# Patient Record
Sex: FEMALE | Race: WHITE | Employment: FULL TIME | URBAN - NONMETROPOLITAN AREA
[De-identification: names, ages, dates, MRNs, and addresses within clinical notes are randomized per-mention and may not be internally consistent; named-entity substitution may affect disease eponyms.]

---

## 2017-02-01 ENCOUNTER — OFFICE VISIT (OUTPATIENT)
Dept: FAMILY MEDICINE CLINIC | Facility: CLINIC | Age: 57
End: 2017-02-01

## 2017-02-01 VITALS
WEIGHT: 232 LBS | TEMPERATURE: 98 F | DIASTOLIC BLOOD PRESSURE: 80 MMHG | SYSTOLIC BLOOD PRESSURE: 140 MMHG | BODY MASS INDEX: 37 KG/M2

## 2017-02-01 DIAGNOSIS — B34.0 ADENOVIRUS INFECTION: Primary | ICD-10-CM

## 2017-02-01 PROCEDURE — 99213 OFFICE O/P EST LOW 20 MIN: CPT | Performed by: INTERNAL MEDICINE

## 2017-02-01 NOTE — PROGRESS NOTES
Edward Gutierrez is a 49year old female. HPI:   Pt has been sick this week. Grandson's is sick as well. She has bilateral conjunctivitis and sore throat.      Current Outpatient Prescriptions:  LISINOPRIL 40 MG Oral Tab TAKE 1 TABLET (40 MG TOTAL) BY BILL the next 5-7 days. No orders of the defined types were placed in this encounter.        Meds & Refills for this Visit:  Pending Prescriptions Disp Refills    gentamicin Sulfate (GENTAK) 0.3 % Ophthalmic Ointment 3.5 g 0     Sig: Place 1 Application into

## 2017-02-06 ENCOUNTER — OFFICE VISIT (OUTPATIENT)
Dept: FAMILY MEDICINE CLINIC | Facility: CLINIC | Age: 57
End: 2017-02-06

## 2017-02-06 VITALS — WEIGHT: 232 LBS | BODY MASS INDEX: 37.28 KG/M2 | OXYGEN SATURATION: 98 % | HEART RATE: 83 BPM | HEIGHT: 66 IN

## 2017-02-06 DIAGNOSIS — Z02.9 ENCOUNTERS FOR ADMINISTRATIVE PURPOSE: Primary | ICD-10-CM

## 2017-02-06 NOTE — PROGRESS NOTES
Pt presented to [facility] for sore throat. While being roomed, she was notified that PCP's office had already sent antibiotic via phone for her symptoms. Pt declines visit and left prior to seeing provider. I did not see pt at all today.   F/U wi

## 2017-02-20 ENCOUNTER — TELEPHONE (OUTPATIENT)
Dept: FAMILY MEDICINE CLINIC | Facility: CLINIC | Age: 57
End: 2017-02-20

## 2017-04-18 ENCOUNTER — OFFICE VISIT (OUTPATIENT)
Dept: FAMILY MEDICINE CLINIC | Facility: CLINIC | Age: 57
End: 2017-04-18

## 2017-04-18 VITALS — SYSTOLIC BLOOD PRESSURE: 138 MMHG | TEMPERATURE: 99 F | DIASTOLIC BLOOD PRESSURE: 86 MMHG

## 2017-04-18 DIAGNOSIS — J02.9 PHARYNGITIS, UNSPECIFIED ETIOLOGY: Primary | ICD-10-CM

## 2017-04-18 PROCEDURE — 87880 STREP A ASSAY W/OPTIC: CPT | Performed by: INTERNAL MEDICINE

## 2017-04-18 PROCEDURE — 99214 OFFICE O/P EST MOD 30 MIN: CPT | Performed by: INTERNAL MEDICINE

## 2017-04-18 PROCEDURE — 87081 CULTURE SCREEN ONLY: CPT | Performed by: INTERNAL MEDICINE

## 2017-04-18 RX ORDER — PREDNISONE 20 MG/1
20 TABLET ORAL 2 TIMES DAILY
Qty: 14 TABLET | Refills: 0 | Status: SHIPPED | OUTPATIENT
Start: 2017-04-18 | End: 2017-07-27

## 2017-04-19 NOTE — PROGRESS NOTES
HPI:   Kiara Simon is a 56year old female who presents for upper respiratory symptoms for  3  days. Patient reports congestion, low grade fever, cough is keeping pt up at night.       Current Outpatient Prescriptions:  predniSONE 20 MG Oral Tab Take 1 pain  NEURO: denies headaches    EXAM:   /86 mmHg  Temp(Src) 99 °F (37.2 °C)  GENERAL: well developed, well nourished,in no apparent distress  SKIN: no rashes,no suspicious lesions  EYES:PERRLA, EOMI, normal optic disk,conjunctiva are clear  HEENT: a

## 2017-04-21 ENCOUNTER — OFFICE VISIT (OUTPATIENT)
Dept: FAMILY MEDICINE CLINIC | Facility: CLINIC | Age: 57
End: 2017-04-21

## 2017-04-21 VITALS
BODY MASS INDEX: 36.53 KG/M2 | OXYGEN SATURATION: 99 % | TEMPERATURE: 98 F | RESPIRATION RATE: 16 BRPM | HEIGHT: 66.5 IN | SYSTOLIC BLOOD PRESSURE: 130 MMHG | DIASTOLIC BLOOD PRESSURE: 80 MMHG | HEART RATE: 96 BPM | WEIGHT: 230 LBS

## 2017-04-21 DIAGNOSIS — B34.9 VIRAL SYNDROME: Primary | ICD-10-CM

## 2017-04-21 PROCEDURE — 99213 OFFICE O/P EST LOW 20 MIN: CPT | Performed by: INTERNAL MEDICINE

## 2017-04-21 NOTE — PROGRESS NOTES
HPI:   Deidre Lai is a 56year old female who presents for upper respiratory symptoms for  7  days. Patient reports congestion, dry cough, ear pain.       Current Outpatient Prescriptions:  predniSONE 20 MG Oral Tab Take 1 tablet (20 mg total) by mouth /80 mmHg  Pulse 96  Temp(Src) 97.8 °F (36.6 °C) (Temporal)  Resp 16  Ht 66.5\"  Wt 230 lb  BMI 36.57 kg/m2  SpO2 99%  GENERAL: well developed, well nourished,in no apparent distress  SKIN: no rashes,no suspicious lesions  EYES:PERRLA, EOMI, normal

## 2017-07-05 RX ORDER — LISINOPRIL 40 MG/1
40 TABLET ORAL DAILY
Qty: 90 TABLET | Refills: 1 | Status: SHIPPED
Start: 2017-07-05 | End: 2017-08-03

## 2017-07-05 RX ORDER — ESCITALOPRAM OXALATE 20 MG/1
20 TABLET ORAL
Qty: 90 TABLET | Refills: 1 | Status: SHIPPED
Start: 2017-07-05 | End: 2018-02-01

## 2017-07-05 RX ORDER — LISINOPRIL 40 MG/1
40 TABLET ORAL DAILY
Qty: 90 TABLET | Refills: 1 | Status: SHIPPED
Start: 2017-07-05 | End: 2018-02-01

## 2017-07-05 RX ORDER — ESCITALOPRAM OXALATE 20 MG/1
20 TABLET ORAL
Qty: 90 TABLET | Refills: 1 | Status: SHIPPED
Start: 2017-07-05 | End: 2017-08-03

## 2017-07-05 NOTE — TELEPHONE ENCOUNTER
From: Caitlin Smith  Sent: 7/4/2017 8:02 AM CDT  Subject: Medication Renewal Request    Caitlin Smith would like a refill of the following medications:  escitalopram 20 MG Oral Tab Mojgan Mejias MD]  LISINOPRIL 40 MG Oral Tab Mojgan Mejias MD]    Pr

## 2017-07-05 NOTE — TELEPHONE ENCOUNTER
From: Evens Aquino  Sent: 7/5/2017 8:06 AM CDT  Subject: Medication Renewal Request    Evens Aquino would like a refill of the following medications:  escitalopram 20 MG Oral Tab Maddi Barbosa MD]  LISINOPRIL 40 MG Oral Tab Maddi Barbosa MD]    Pr

## 2017-07-27 ENCOUNTER — TELEPHONE (OUTPATIENT)
Dept: FAMILY MEDICINE CLINIC | Facility: CLINIC | Age: 57
End: 2017-07-27

## 2017-07-27 ENCOUNTER — HOSPITAL ENCOUNTER (OUTPATIENT)
Age: 57
Discharge: HOME | End: 2017-07-27
Attending: FAMILY MEDICINE
Payer: COMMERCIAL

## 2017-07-27 ENCOUNTER — APPOINTMENT (OUTPATIENT)
Dept: GENERAL RADIOLOGY | Age: 57
End: 2017-07-27
Attending: FAMILY MEDICINE
Payer: COMMERCIAL

## 2017-07-27 VITALS
RESPIRATION RATE: 16 BRPM | HEART RATE: 85 BPM | TEMPERATURE: 98 F | DIASTOLIC BLOOD PRESSURE: 74 MMHG | OXYGEN SATURATION: 97 % | SYSTOLIC BLOOD PRESSURE: 121 MMHG

## 2017-07-27 DIAGNOSIS — S83.401A SPRAIN OF COLLATERAL LIGAMENT OF RIGHT KNEE, INITIAL ENCOUNTER: ICD-10-CM

## 2017-07-27 DIAGNOSIS — M25.461 KNEE EFFUSION, RIGHT: Primary | ICD-10-CM

## 2017-07-27 PROCEDURE — 73560 X-RAY EXAM OF KNEE 1 OR 2: CPT | Performed by: FAMILY MEDICINE

## 2017-07-27 PROCEDURE — 99204 OFFICE O/P NEW MOD 45 MIN: CPT

## 2017-07-27 PROCEDURE — 99213 OFFICE O/P EST LOW 20 MIN: CPT

## 2017-07-27 RX ORDER — IBUPROFEN 600 MG/1
600 TABLET ORAL EVERY 8 HOURS PRN
Qty: 21 TABLET | Refills: 0 | Status: SHIPPED | OUTPATIENT
Start: 2017-07-27 | End: 2017-08-03

## 2017-07-27 NOTE — ED PROVIDER NOTES
Patient Seen in: [address] [facility]    History   Patient presents with:  Knee Pain    Stated Complaint: knee pain    HPI    82-year-old female presents to immediate care with a painful right knee, 2 hours ago while at work was walking and fe Systems    Positive for stated complaint: knee pain  Other systems are as noted in HPI. Constitutional and vital signs reviewed. All other systems reviewed and negative except as noted above.     PSFH elements reviewed from today and agreed except as XR KNEE (1 OR 2 VIEWS), RIGHT (CPT=73560)  COMPARISON:  None. INDICATIONS:  knee pain  PATIENT STATED HISTORY: (As transcribed by Technologist)  Sudden onset of medial right knee pain and \"crunching\" sound when walking today.     FINDINGS:  BONES:  There

## 2017-07-27 NOTE — TELEPHONE ENCOUNTER
LM for patient that Dr Kathryn Monk wants her to go to [facility] urgent care Efrain Prabhakar.

## 2017-07-27 NOTE — TELEPHONE ENCOUNTER
Patient said she was walking and felt like her knee \"gave out\" and now it is numb and painful. Urgent care [place]?

## 2017-08-03 ENCOUNTER — OFFICE VISIT (OUTPATIENT)
Dept: FAMILY MEDICINE CLINIC | Facility: CLINIC | Age: 57
End: 2017-08-03

## 2017-08-03 VITALS
BODY MASS INDEX: 38.25 KG/M2 | HEIGHT: 66 IN | WEIGHT: 238 LBS | DIASTOLIC BLOOD PRESSURE: 80 MMHG | HEART RATE: 74 BPM | SYSTOLIC BLOOD PRESSURE: 138 MMHG | TEMPERATURE: 98 F | OXYGEN SATURATION: 99 %

## 2017-08-03 DIAGNOSIS — E66.9 OBESITY (BMI 30-39.9): ICD-10-CM

## 2017-08-03 DIAGNOSIS — M17.11 PRIMARY OSTEOARTHRITIS OF RIGHT KNEE: Primary | ICD-10-CM

## 2017-08-03 PROCEDURE — 99214 OFFICE O/P EST MOD 30 MIN: CPT | Performed by: INTERNAL MEDICINE

## 2017-08-03 NOTE — PROGRESS NOTES
Ferdinand Her is a 56year old female. HPI:   Pt has an effusion and pain in the right knee. She is going to a [employer] convention this week and has it wrapped with compression. She did have X -rays at urgent care and she has tri compartment disease. return, ice, nsaids, and reduce weight to prevent further flare ups. She has never had injections of any kind. No orders of the defined types were placed in this encounter.       Meds & Refills for this Visit:  No prescriptions requested or ordered in th

## 2017-08-11 ENCOUNTER — TELEPHONE (OUTPATIENT)
Dept: FAMILY MEDICINE CLINIC | Facility: CLINIC | Age: 57
End: 2017-08-11

## 2017-08-11 NOTE — TELEPHONE ENCOUNTER
ER DOCTOR GAVE HER SCRIPT FOR IBUPROFEN 600MG AND IT IS NOT HELPING ASKING FOR SOMETHING STRONGER FOR KNEE PAIN

## 2017-08-11 NOTE — TELEPHONE ENCOUNTER
Patient called and states that she was seen at urgent care on 7/27 for right knee pain, she was given ibuprofen 600 mg tabs, and they helped at first, but they are no longer helping with the pain.  Patient states she has one tablet left, and is wanting to k

## 2017-08-12 NOTE — TELEPHONE ENCOUNTER
Per v/o from Dr. Breanna simmons to send Diclofenac 50 mg tabs three times daily as needed. Patient should f/u to discuss what to do next. Medication sent to pharmacy. Patient states will call back to schedule.    Latasha Brown, 08/12/17, 9:35 AM

## 2017-08-15 ENCOUNTER — TELEPHONE (OUTPATIENT)
Dept: FAMILY MEDICINE CLINIC | Facility: CLINIC | Age: 57
End: 2017-08-15

## 2017-08-15 NOTE — TELEPHONE ENCOUNTER
Woke up this morning, foot is very swollen,  Looks like its  sprained, Can she be seen today?   Does have appt tomorrow @ 10:45

## 2017-08-15 NOTE — TELEPHONE ENCOUNTER
Patient states that her \"foot is as big as a house\" but she denies pain. She said that she does not want to see anybody else.  She said that she has an appointment with Dr Karen Alfaro tomorrow and wants to know if there is anything else she should do besides el

## 2017-08-16 ENCOUNTER — OFFICE VISIT (OUTPATIENT)
Dept: FAMILY MEDICINE CLINIC | Facility: CLINIC | Age: 57
End: 2017-08-16

## 2017-08-16 VITALS
HEART RATE: 82 BPM | RESPIRATION RATE: 16 BRPM | WEIGHT: 238 LBS | SYSTOLIC BLOOD PRESSURE: 130 MMHG | BODY MASS INDEX: 38 KG/M2 | DIASTOLIC BLOOD PRESSURE: 80 MMHG | TEMPERATURE: 98 F

## 2017-08-16 DIAGNOSIS — Z12.39 SCREENING FOR BREAST CANCER: ICD-10-CM

## 2017-08-16 DIAGNOSIS — M79.89 FOOT SWELLING: Primary | ICD-10-CM

## 2017-08-16 DIAGNOSIS — M66.0 RUPTURED BAKERS CYST: ICD-10-CM

## 2017-08-16 DIAGNOSIS — M13.861 ALLERGIC ARTHRITIS OF RIGHT KNEE: ICD-10-CM

## 2017-08-16 PROCEDURE — 99214 OFFICE O/P EST MOD 30 MIN: CPT | Performed by: INTERNAL MEDICINE

## 2017-08-17 NOTE — PROGRESS NOTES
Yaquelin Ramirez is a 56year old female. HPI:   Pt has had swelling in her right foot that started when she went to [place] for an [employer]. She has vast improvement now and no pain, tenderness or redness.  She did have nee pain prior to leaving a (primary encounter diagnosis)  Ruptured bakers cyst  Screening for breast cancer  Allergic arthritis of right knee  Pt had a ruptured baker's cyst tht is now resolving, I have sent her to Dr Yazmin Murphy for resolution of her right knee pain.    No orders of th

## 2017-09-26 ENCOUNTER — HOSPITAL ENCOUNTER (OUTPATIENT)
Dept: GENERAL RADIOLOGY | Age: 57
Discharge: HOME | End: 2017-09-26
Attending: ORTHOPAEDIC SURGERY
Payer: COMMERCIAL

## 2017-09-26 DIAGNOSIS — M25.561 RIGHT KNEE PAIN, UNSPECIFIED CHRONICITY: ICD-10-CM

## 2017-09-26 PROCEDURE — 73562 X-RAY EXAM OF KNEE 3: CPT | Performed by: ORTHOPAEDIC SURGERY

## 2017-09-26 PROCEDURE — 73564 X-RAY EXAM KNEE 4 OR MORE: CPT | Performed by: ORTHOPAEDIC SURGERY

## 2017-10-30 ENCOUNTER — TELEPHONE (OUTPATIENT)
Dept: FAMILY MEDICINE CLINIC | Facility: CLINIC | Age: 57
End: 2017-10-30

## 2017-10-30 NOTE — TELEPHONE ENCOUNTER
Pt has a cramp in leg that has not gone away in 3 days. Scheduled an appt for tomorrow, wants to know if she should be seen sooner?

## 2017-10-30 NOTE — TELEPHONE ENCOUNTER
Patient said she woke up Friday night with bad \"cramps in her right leg\" from the bottom of her calf to the top of her calf. She said said it is painful when she walks and it gets very \"tight\" .  She said she got a Cortisone shot in that knee at the end

## 2017-10-31 ENCOUNTER — OFFICE VISIT (OUTPATIENT)
Dept: FAMILY MEDICINE CLINIC | Facility: CLINIC | Age: 57
End: 2017-10-31

## 2017-10-31 VITALS
HEART RATE: 80 BPM | WEIGHT: 239.25 LBS | TEMPERATURE: 98 F | HEIGHT: 66 IN | BODY MASS INDEX: 38.45 KG/M2 | RESPIRATION RATE: 18 BRPM | SYSTOLIC BLOOD PRESSURE: 140 MMHG | DIASTOLIC BLOOD PRESSURE: 78 MMHG

## 2017-10-31 DIAGNOSIS — R25.2 MUSCLE CRAMPS: Primary | ICD-10-CM

## 2017-10-31 DIAGNOSIS — K64.5: ICD-10-CM

## 2017-10-31 PROCEDURE — 99214 OFFICE O/P EST MOD 30 MIN: CPT | Performed by: INTERNAL MEDICINE

## 2017-10-31 NOTE — PROGRESS NOTES
Mable Lama is a 56year old female. HPI:   Pt has been having some cramps in the right leg. She had an injection recently for osteoarthritis which helped tremendously. She was instructed to stretch and hydrate and her cramps are much better.  She ha (primary encounter diagnosis)  External thrombosed piles  Resolved muscle cramping  External pile non-tender, recommended Epson salts and tucks pads. No orders of the defined types were placed in this encounter.       Meds & Refills for this Visit:  No p

## 2017-12-11 ENCOUNTER — TELEPHONE (OUTPATIENT)
Dept: FAMILY MEDICINE CLINIC | Facility: CLINIC | Age: 57
End: 2017-12-11

## 2017-12-11 NOTE — TELEPHONE ENCOUNTER
Patient said yesterday at work she got very dizzy and shaky. She said she sat down and drank a coke then went home and fell asleep. She said that today she feels a \"little woozy\" and one side of her tongue is numb.

## 2017-12-12 ENCOUNTER — LAB ENCOUNTER (OUTPATIENT)
Dept: LAB | Age: 57
End: 2017-12-12
Attending: INTERNAL MEDICINE
Payer: COMMERCIAL

## 2017-12-12 ENCOUNTER — OFFICE VISIT (OUTPATIENT)
Dept: FAMILY MEDICINE CLINIC | Facility: CLINIC | Age: 57
End: 2017-12-12

## 2017-12-12 VITALS
WEIGHT: 241 LBS | RESPIRATION RATE: 16 BRPM | SYSTOLIC BLOOD PRESSURE: 128 MMHG | TEMPERATURE: 98 F | HEART RATE: 64 BPM | BODY MASS INDEX: 39 KG/M2 | DIASTOLIC BLOOD PRESSURE: 80 MMHG

## 2017-12-12 DIAGNOSIS — R42 DIZZINESS: Primary | ICD-10-CM

## 2017-12-12 DIAGNOSIS — R42 DIZZINESS: ICD-10-CM

## 2017-12-12 PROCEDURE — 84439 ASSAY OF FREE THYROXINE: CPT | Performed by: INTERNAL MEDICINE

## 2017-12-12 PROCEDURE — 83036 HEMOGLOBIN GLYCOSYLATED A1C: CPT | Performed by: INTERNAL MEDICINE

## 2017-12-12 PROCEDURE — 99214 OFFICE O/P EST MOD 30 MIN: CPT | Performed by: INTERNAL MEDICINE

## 2017-12-12 PROCEDURE — 36415 COLL VENOUS BLD VENIPUNCTURE: CPT | Performed by: INTERNAL MEDICINE

## 2017-12-12 PROCEDURE — 80050 GENERAL HEALTH PANEL: CPT | Performed by: INTERNAL MEDICINE

## 2017-12-12 NOTE — PROGRESS NOTES
Brooklyn Quiles is a 56year old female. HPI:   Pt has fatigue and dizziness intermittently. It resolves after sugar. She has no pain and no nauseated. She denies chest pain. She has there little \"spells\" from time to time.     Current Outpatient Presc Panel (14) [E]      Hemoglobin A1C [E]      TSH and Free T4 [E]      CBC W Differential W Platelet [E]    Meds & Refills for this Visit:  No prescriptions requested or ordered in this encounter    Imaging & Consults:  None    Follow up as needed.       The

## 2017-12-14 ENCOUNTER — NURSE ONLY (OUTPATIENT)
Dept: FAMILY MEDICINE CLINIC | Facility: CLINIC | Age: 57
End: 2017-12-14

## 2017-12-14 DIAGNOSIS — R42 DIZZINESS: Primary | ICD-10-CM

## 2017-12-14 PROCEDURE — 93000 ELECTROCARDIOGRAM COMPLETE: CPT | Performed by: INTERNAL MEDICINE

## 2018-02-01 RX ORDER — SIMVASTATIN 40 MG
40 TABLET ORAL NIGHTLY
Qty: 90 TABLET | Refills: 0 | Status: SHIPPED | OUTPATIENT
Start: 2018-02-01 | End: 2018-06-07

## 2018-02-01 RX ORDER — ESCITALOPRAM OXALATE 20 MG/1
20 TABLET ORAL
Qty: 90 TABLET | Refills: 0 | Status: SHIPPED | OUTPATIENT
Start: 2018-02-01 | End: 2018-06-07

## 2018-02-01 RX ORDER — LISINOPRIL 40 MG/1
40 TABLET ORAL DAILY
Qty: 90 TABLET | Refills: 0 | Status: SHIPPED | OUTPATIENT
Start: 2018-02-01 | End: 2018-06-07

## 2018-02-01 NOTE — TELEPHONE ENCOUNTER
Last office visit 12-12-17 128/80  Last lipids 10-18-16  Last refill Simvastatin 2-1-16 #90  Last refill other meds 7-5-17 #90 with 1

## 2018-02-01 NOTE — TELEPHONE ENCOUNTER
From: Brooklyn Quiles  Sent: 2/1/2018 9:17 AM CST  Subject: Medication Renewal Request    Brooklyn Quiles would like a refill of the following medications:     simvastatin (ZOCOR) 40 MG Oral Tab Silviano Petty MD]     escitalopram 20 MG Oral Tab Emiliana Graves

## 2018-06-08 RX ORDER — LISINOPRIL 40 MG/1
40 TABLET ORAL DAILY
Qty: 90 TABLET | Refills: 0 | Status: SHIPPED | OUTPATIENT
Start: 2018-06-08 | End: 2018-10-18

## 2018-06-08 RX ORDER — ESCITALOPRAM OXALATE 20 MG/1
20 TABLET ORAL
Qty: 90 TABLET | Refills: 0 | Status: SHIPPED | OUTPATIENT
Start: 2018-06-08 | End: 2018-12-27

## 2018-06-08 RX ORDER — SIMVASTATIN 40 MG
40 TABLET ORAL NIGHTLY
Qty: 90 TABLET | Refills: 0 | Status: SHIPPED | OUTPATIENT
Start: 2018-06-08 | End: 2018-09-10

## 2018-06-08 RX ORDER — SIMVASTATIN 40 MG
40 TABLET ORAL NIGHTLY
Qty: 90 TABLET | Refills: 0 | Status: CANCELLED
Start: 2018-06-08

## 2018-06-08 RX ORDER — LISINOPRIL 40 MG/1
40 TABLET ORAL DAILY
Qty: 90 TABLET | Refills: 0 | Status: CANCELLED
Start: 2018-06-08

## 2018-06-08 RX ORDER — ESCITALOPRAM OXALATE 20 MG/1
20 TABLET ORAL
Qty: 90 TABLET | Refills: 0 | Status: CANCELLED
Start: 2018-06-08

## 2018-06-08 NOTE — TELEPHONE ENCOUNTER
From: Tessa Carranza  Sent: 6/8/2018 9:16 AM CDT  Subject: Medication Renewal Request    Tessa Carranza would like a refill of the following medications:     simvastatin (ZOCOR) 40 MG Oral Tab Dinh Sheridan MD]     escitalopram 20 MG Oral Tab Jeanette Rios

## 2018-06-08 NOTE — TELEPHONE ENCOUNTER
From: Lucio Loyola  Sent: 6/7/2018 11:24 PM CDT  Subject: Medication Renewal Request    Lucio Loyola would like a refill of the following medications:     simvastatin (ZOCOR) 40 MG Oral Tab Helena Aguilar MD]     escitalopram 20 MG Oral Tab Chevis Pleasant

## 2018-07-19 ENCOUNTER — APPOINTMENT (OUTPATIENT)
Dept: GENERAL RADIOLOGY | Facility: HOSPITAL | Age: 58
End: 2018-07-19
Payer: COMMERCIAL

## 2018-07-19 ENCOUNTER — APPOINTMENT (OUTPATIENT)
Dept: MRI IMAGING | Facility: HOSPITAL | Age: 58
End: 2018-07-19
Attending: EMERGENCY MEDICINE
Payer: COMMERCIAL

## 2018-07-19 ENCOUNTER — HOSPITAL ENCOUNTER (EMERGENCY)
Facility: HOSPITAL | Age: 58
Discharge: HOME | End: 2018-07-19
Attending: EMERGENCY MEDICINE
Payer: COMMERCIAL

## 2018-07-19 VITALS
WEIGHT: 245 LBS | OXYGEN SATURATION: 99 % | SYSTOLIC BLOOD PRESSURE: 140 MMHG | HEIGHT: 66 IN | TEMPERATURE: 96 F | HEART RATE: 66 BPM | RESPIRATION RATE: 16 BRPM | DIASTOLIC BLOOD PRESSURE: 53 MMHG | BODY MASS INDEX: 39.37 KG/M2

## 2018-07-19 DIAGNOSIS — R20.2 FACIAL TINGLING: Primary | ICD-10-CM

## 2018-07-19 LAB
ALBUMIN SERPL-MCNC: 3.1 G/DL (ref 3.5–4.8)
ALBUMIN/GLOB SERPL: 0.8 {RATIO} (ref 1–2)
ALP LIVER SERPL-CCNC: 87 U/L (ref 46–118)
ALT SERPL-CCNC: 32 U/L (ref 14–54)
ANION GAP SERPL CALC-SCNC: 8 MMOL/L (ref 0–18)
APTT PPP: 22.4 SECONDS (ref 26.1–34.6)
AST SERPL-CCNC: 21 U/L (ref 15–41)
BASOPHILS # BLD AUTO: 0.05 X10(3) UL (ref 0–0.1)
BASOPHILS NFR BLD AUTO: 0.7 %
BILIRUB SERPL-MCNC: 0.3 MG/DL (ref 0.1–2)
BILIRUB UR QL STRIP.AUTO: NEGATIVE
BUN BLD-MCNC: 19 MG/DL (ref 8–20)
BUN/CREAT SERPL: 29.7 (ref 10–20)
CALCIUM BLD-MCNC: 9 MG/DL (ref 8.3–10.3)
CHLORIDE SERPL-SCNC: 107 MMOL/L (ref 101–111)
CLARITY UR REFRACT.AUTO: CLEAR
CO2 SERPL-SCNC: 26 MMOL/L (ref 22–32)
COLOR UR AUTO: YELLOW
CREAT BLD-MCNC: 0.64 MG/DL (ref 0.55–1.02)
EOSINOPHIL # BLD AUTO: 0.12 X10(3) UL (ref 0–0.3)
EOSINOPHIL NFR BLD AUTO: 1.6 %
ERYTHROCYTE [DISTWIDTH] IN BLOOD BY AUTOMATED COUNT: 11.8 % (ref 11.5–16)
GLOBULIN PLAS-MCNC: 3.9 G/DL (ref 2.5–3.7)
GLUCOSE BLD-MCNC: 124 MG/DL (ref 70–99)
GLUCOSE UR STRIP.AUTO-MCNC: NEGATIVE MG/DL
HCT VFR BLD AUTO: 41 % (ref 34–50)
HGB BLD-MCNC: 13.9 G/DL (ref 12–16)
IMMATURE GRANULOCYTE COUNT: 0.03 X10(3) UL (ref 0–1)
IMMATURE GRANULOCYTE RATIO %: 0.4 %
INR BLD: 0.83 (ref 0.9–1.1)
KETONES UR STRIP.AUTO-MCNC: NEGATIVE MG/DL
LYMPHOCYTES # BLD AUTO: 2.48 X10(3) UL (ref 0.9–4)
LYMPHOCYTES NFR BLD AUTO: 32.6 %
M PROTEIN MFR SERPL ELPH: 7 G/DL (ref 6.1–8.3)
MCH RBC QN AUTO: 30.3 PG (ref 27–33.2)
MCHC RBC AUTO-ENTMCNC: 33.9 G/DL (ref 31–37)
MCV RBC AUTO: 89.5 FL (ref 81–100)
MONOCYTES # BLD AUTO: 0.53 X10(3) UL (ref 0.1–1)
MONOCYTES NFR BLD AUTO: 7 %
NEUTROPHIL ABS PRELIM: 4.4 X10 (3) UL (ref 1.3–6.7)
NEUTROPHILS # BLD AUTO: 4.4 X10(3) UL (ref 1.3–6.7)
NEUTROPHILS NFR BLD AUTO: 57.7 %
NITRITE UR QL STRIP.AUTO: NEGATIVE
OSMOLALITY SERPL CALC.SUM OF ELEC: 296 MOSM/KG (ref 275–295)
PH UR STRIP.AUTO: 5 [PH] (ref 4.5–8)
PLATELET # BLD AUTO: 287 10(3)UL (ref 150–450)
POTASSIUM SERPL-SCNC: 4.4 MMOL/L (ref 3.6–5.1)
PROT UR STRIP.AUTO-MCNC: NEGATIVE MG/DL
PSA SERPL DL<=0.01 NG/ML-MCNC: 11.8 SECONDS (ref 12.4–14.7)
RBC # BLD AUTO: 4.58 X10(6)UL (ref 3.8–5.1)
RBC UR QL AUTO: NEGATIVE
RED CELL DISTRIBUTION WIDTH-SD: 38.2 FL (ref 35.1–46.3)
SODIUM SERPL-SCNC: 141 MMOL/L (ref 136–144)
SP GR UR STRIP.AUTO: 1.02 (ref 1–1.03)
TROPONIN I SERPL-MCNC: <0.046 NG/ML (ref ?–0.05)
UROBILINOGEN UR STRIP.AUTO-MCNC: <2 MG/DL
WBC # BLD AUTO: 7.6 X10(3) UL (ref 4–13)

## 2018-07-19 PROCEDURE — 70553 MRI BRAIN STEM W/O & W/DYE: CPT | Performed by: EMERGENCY MEDICINE

## 2018-07-19 PROCEDURE — 85025 COMPLETE CBC W/AUTO DIFF WBC: CPT | Performed by: EMERGENCY MEDICINE

## 2018-07-19 PROCEDURE — 85730 THROMBOPLASTIN TIME PARTIAL: CPT | Performed by: EMERGENCY MEDICINE

## 2018-07-19 PROCEDURE — 99285 EMERGENCY DEPT VISIT HI MDM: CPT

## 2018-07-19 PROCEDURE — A9576 INJ PROHANCE MULTIPACK: HCPCS | Performed by: EMERGENCY MEDICINE

## 2018-07-19 PROCEDURE — 85610 PROTHROMBIN TIME: CPT

## 2018-07-19 PROCEDURE — 87086 URINE CULTURE/COLONY COUNT: CPT | Performed by: EMERGENCY MEDICINE

## 2018-07-19 PROCEDURE — 84484 ASSAY OF TROPONIN QUANT: CPT

## 2018-07-19 PROCEDURE — 84484 ASSAY OF TROPONIN QUANT: CPT | Performed by: EMERGENCY MEDICINE

## 2018-07-19 PROCEDURE — 93005 ELECTROCARDIOGRAM TRACING: CPT

## 2018-07-19 PROCEDURE — 85730 THROMBOPLASTIN TIME PARTIAL: CPT

## 2018-07-19 PROCEDURE — 36415 COLL VENOUS BLD VENIPUNCTURE: CPT

## 2018-07-19 PROCEDURE — 93010 ELECTROCARDIOGRAM REPORT: CPT

## 2018-07-19 PROCEDURE — 84450 TRANSFERASE (AST) (SGOT): CPT | Performed by: EMERGENCY MEDICINE

## 2018-07-19 PROCEDURE — 80053 COMPREHEN METABOLIC PANEL: CPT

## 2018-07-19 PROCEDURE — 70549 MR ANGIOGRAPH NECK W/O&W/DYE: CPT | Performed by: EMERGENCY MEDICINE

## 2018-07-19 PROCEDURE — 70546 MR ANGIOGRAPH HEAD W/O&W/DYE: CPT | Performed by: EMERGENCY MEDICINE

## 2018-07-19 PROCEDURE — 85025 COMPLETE CBC W/AUTO DIFF WBC: CPT

## 2018-07-19 PROCEDURE — 85610 PROTHROMBIN TIME: CPT | Performed by: EMERGENCY MEDICINE

## 2018-07-19 PROCEDURE — 80053 COMPREHEN METABOLIC PANEL: CPT | Performed by: EMERGENCY MEDICINE

## 2018-07-19 PROCEDURE — 71045 X-RAY EXAM CHEST 1 VIEW: CPT

## 2018-07-19 PROCEDURE — 84132 ASSAY OF SERUM POTASSIUM: CPT | Performed by: EMERGENCY MEDICINE

## 2018-07-19 PROCEDURE — 81001 URINALYSIS AUTO W/SCOPE: CPT | Performed by: EMERGENCY MEDICINE

## 2018-07-19 NOTE — ED INITIAL ASSESSMENT (HPI)
After driving home from work on Tuesday, patient's daughter reports patient was \"white as a ghost\" when she walk in the door and \"not making sense\" with her speech; patient reports feeling tongue and lip numbness on left side at that time (has not reso

## 2018-07-20 LAB
ATRIAL RATE: 77 BPM
P AXIS: 44 DEGREES
P-R INTERVAL: 152 MS
Q-T INTERVAL: 388 MS
QRS DURATION: 88 MS
QTC CALCULATION (BEZET): 439 MS
R AXIS: 3 DEGREES
T AXIS: 45 DEGREES
VENTRICULAR RATE: 77 BPM

## 2018-07-20 NOTE — ED PROVIDER NOTES
Patient Seen in: [facility] Emergency Department    History   Patient presents with:  Numbness Weakness (neurologic)    Stated Complaint: numbness to lip x 72 hrs    HPI    71-year-old female presents to the emergency department complaining of numbn 39.54 kg/m²         Physical Exam    General appearance: This is a middle-aged female lying in a gurney. Vital signs were reviewed per nurse's notes. HEENT: Normocephalic atraumatic. Pupils equal round reactive to light.   Extraocular movements are intac -----------         ------                     CBC W/ DIFFERENTIAL[[id number]]                              Final result                 Please view results for these tests on the individual orders.    RAINBOW DRAW BLUE   RAINBOW DRAW Sung Wills

## 2018-07-20 NOTE — ED NOTES
Pt's right arm noted to be swollen at IV site, IV d/c. Pt sat up, walked to restroom with steady gait. Urine obtained and sent.

## 2018-07-20 NOTE — ED NOTES
Rounded on patient; updated on plan of care. Patient aware of wait for MRI, she will take a nap while waiting.

## 2018-07-20 NOTE — ED PROVIDER NOTES
Patient is a 65-year-old female who presents the emergency department complaining of tingling sensation in the left side of her tongue and lip. Patient was initially evaluated by my partner, please refer to their documentation for additional details.   She

## 2018-07-29 ENCOUNTER — PATIENT MESSAGE (OUTPATIENT)
Dept: FAMILY MEDICINE CLINIC | Facility: CLINIC | Age: 58
End: 2018-07-29

## 2018-07-30 NOTE — TELEPHONE ENCOUNTER
From: Evelyn Gonzales  To: Wilfredo Healy MD  Sent: 7/29/2018 8:48 PM CDT  Subject: Referral Request    I will call you on Monday to discuss my situation. Also my daughter will be able to tell you what she observed too.     Hemalatha Gonzales

## 2018-07-31 ENCOUNTER — TELEPHONE (OUTPATIENT)
Dept: FAMILY MEDICINE CLINIC | Facility: CLINIC | Age: 58
End: 2018-07-31

## 2018-07-31 DIAGNOSIS — R48.1: Primary | ICD-10-CM

## 2018-07-31 NOTE — TELEPHONE ENCOUNTER
Was in THE [facility] ER with numbness to lip and tongue, they did an MRI, it was normal. They told her she should see a neurologist. In response to my questions, she advised that you can see all of these things in her chart.

## 2018-09-10 ENCOUNTER — OFFICE VISIT (OUTPATIENT)
Dept: NEUROLOGY | Facility: CLINIC | Age: 58
End: 2018-09-10
Payer: COMMERCIAL

## 2018-09-10 VITALS
WEIGHT: 251 LBS | SYSTOLIC BLOOD PRESSURE: 128 MMHG | DIASTOLIC BLOOD PRESSURE: 80 MMHG | HEART RATE: 78 BPM | BODY MASS INDEX: 41 KG/M2 | RESPIRATION RATE: 16 BRPM

## 2018-09-10 DIAGNOSIS — G45.9 TIA (TRANSIENT ISCHEMIC ATTACK): Primary | ICD-10-CM

## 2018-09-10 DIAGNOSIS — G43.109 MIGRAINE EQUIVALENT: ICD-10-CM

## 2018-09-10 DIAGNOSIS — R51.9 FACIAL DISCOMFORT: ICD-10-CM

## 2018-09-10 PROCEDURE — 99204 OFFICE O/P NEW MOD 45 MIN: CPT | Performed by: OTHER

## 2018-09-10 RX ORDER — SIMVASTATIN 40 MG
40 TABLET ORAL NIGHTLY
Qty: 90 TABLET | Refills: 0 | Status: SHIPPED | OUTPATIENT
Start: 2018-09-10 | End: 2019-03-06

## 2018-09-10 NOTE — PROGRESS NOTES
[employer] in [place] with [facility]  Neurology   9/10/2018, 10:43 AM     Angela Durand Patient Status:  No patient class for patient encounter    3/23/1960 MRN [id number]   Location @EN Tab Take 1 tablet (20 mg total) by mouth once daily. Disp: 90 tablet Rfl: 0   lisinopril 40 MG Oral Tab Take 1 tablet (40 mg total) by mouth daily. Disp: 90 tablet Rfl: 0       REVIEW OF SYSTEMS:  General: denies any fever or chills.      Eye: no pain or re test is negative,   Special tests:         Imaging:  MRI brain, MRA brain and neck,   CONCLUSION:  No MR evidence for acute intracranial infarction. Mild chronic microvascular ischemic changes. Retention cysts in both maxillary sinuses.      MRA [employer]

## 2018-09-10 NOTE — PATIENT INSTRUCTIONS
Refill policies:    • Allow 2-3 business days for refills; controlled substances may take longer.   • Contact your pharmacy at least 5 days prior to running out of medication and have them send an electronic request or submit request through the “request re entire amount billed. Precertification and Prior Authorizations: If your physician has recommended that you have a procedure or additional testing performed.   [facility]) will contact your insurance carrier to obtain pre-certi

## 2018-09-19 ENCOUNTER — HOSPITAL ENCOUNTER (OUTPATIENT)
Dept: CV DIAGNOSTICS | Age: 58
Discharge: HOME | End: 2018-09-19
Attending: Other
Payer: COMMERCIAL

## 2018-09-19 DIAGNOSIS — G45.9 TIA (TRANSIENT ISCHEMIC ATTACK): ICD-10-CM

## 2018-09-19 PROCEDURE — 93306 TTE W/DOPPLER COMPLETE: CPT | Performed by: OTHER

## 2018-09-20 ENCOUNTER — TELEPHONE (OUTPATIENT)
Dept: NEUROLOGY | Facility: CLINIC | Age: 58
End: 2018-09-20

## 2018-09-20 NOTE — TELEPHONE ENCOUNTER
[employer] message sent to patient to notify of results.      ----- Message from Chely Amato MD sent at 9/20/2018  2:16 PM CDT -----  Echo is normal

## 2018-10-17 ENCOUNTER — TELEPHONE (OUTPATIENT)
Dept: FAMILY MEDICINE CLINIC | Facility: CLINIC | Age: 58
End: 2018-10-17

## 2018-10-17 RX ORDER — ESCITALOPRAM OXALATE 20 MG/1
20 TABLET ORAL DAILY
Qty: 90 TABLET | Refills: 0 | Status: SHIPPED | OUTPATIENT
Start: 2018-10-17 | End: 2019-02-28

## 2018-10-18 RX ORDER — LISINOPRIL 40 MG/1
40 TABLET ORAL DAILY
Qty: 90 TABLET | Refills: 0 | Status: SHIPPED | OUTPATIENT
Start: 2018-10-18 | End: 2019-02-28

## 2018-10-24 ENCOUNTER — APPOINTMENT (OUTPATIENT)
Dept: LAB | Age: 58
End: 2018-10-24
Attending: INTERNAL MEDICINE
Payer: COMMERCIAL

## 2018-10-24 DIAGNOSIS — G45.9 TIA (TRANSIENT ISCHEMIC ATTACK): ICD-10-CM

## 2018-10-24 PROCEDURE — 36415 COLL VENOUS BLD VENIPUNCTURE: CPT | Performed by: OTHER

## 2018-10-24 PROCEDURE — 83036 HEMOGLOBIN GLYCOSYLATED A1C: CPT | Performed by: OTHER

## 2018-10-24 PROCEDURE — 80061 LIPID PANEL: CPT | Performed by: OTHER

## 2018-12-27 ENCOUNTER — OFFICE VISIT (OUTPATIENT)
Dept: FAMILY MEDICINE CLINIC | Facility: CLINIC | Age: 58
End: 2018-12-27
Payer: COMMERCIAL

## 2018-12-27 VITALS
HEART RATE: 86 BPM | TEMPERATURE: 99 F | HEIGHT: 66.75 IN | OXYGEN SATURATION: 96 % | SYSTOLIC BLOOD PRESSURE: 138 MMHG | WEIGHT: 248.5 LBS | DIASTOLIC BLOOD PRESSURE: 80 MMHG | BODY MASS INDEX: 39 KG/M2

## 2018-12-27 DIAGNOSIS — Z00.00 WELL ADULT EXAM: Primary | ICD-10-CM

## 2018-12-27 PROCEDURE — 99396 PREV VISIT EST AGE 40-64: CPT | Performed by: INTERNAL MEDICINE

## 2018-12-27 NOTE — PROGRESS NOTES
HPI:   Geoff Coon is a 66year old female who presents for a complete physical exam. Symptoms: denies discharge, itching, burning or dysuria, is menopausal. Patient complains of nothing.        Immunization History  Administered            Date(s) Admi FOOT/TOES SURGERY PROC UNLISTED     • TONSILLECTOMY        Family History   Problem Relation Age of Onset   • Heart Disorder Father    • Diabetes Father    • Other (Other) Father    • Heart Disorder Mother    • Other (Other) Mother    • Cancer Brother 29 murmur  GI: good BS's,no masses, HSM or tenderness  :deferred  RECTAL:deferred  MUSCULOSKELETAL: back is not tender,FROM of the back  EXTREMITIES: no cyanosis, clubbing or edema  NEURO: Oriented times three,cranial nerves are intact,motor and sensory are

## 2019-01-24 ENCOUNTER — OFFICE VISIT (OUTPATIENT)
Dept: FAMILY MEDICINE CLINIC | Facility: CLINIC | Age: 59
End: 2019-01-24
Payer: COMMERCIAL

## 2019-01-24 VITALS
SYSTOLIC BLOOD PRESSURE: 140 MMHG | DIASTOLIC BLOOD PRESSURE: 80 MMHG | OXYGEN SATURATION: 94 % | WEIGHT: 253.25 LBS | TEMPERATURE: 99 F | HEART RATE: 95 BPM | BODY MASS INDEX: 40.22 KG/M2 | HEIGHT: 66.5 IN

## 2019-01-24 DIAGNOSIS — M25.561 CHRONIC PAIN OF RIGHT KNEE: Primary | ICD-10-CM

## 2019-01-24 DIAGNOSIS — G89.29 CHRONIC PAIN OF RIGHT KNEE: Primary | ICD-10-CM

## 2019-01-24 PROCEDURE — 99214 OFFICE O/P EST MOD 30 MIN: CPT | Performed by: INTERNAL MEDICINE

## 2019-01-25 NOTE — PROGRESS NOTES
Caitlin Smith is a 66year old female. HPI:   Painful right knee, swollen and cannot flex. She got stuck in the tube a few weeks ago. Hard to get in a car, bend to lift anything and stand for any length of time.   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

## 2019-01-28 ENCOUNTER — TELEPHONE (OUTPATIENT)
Dept: FAMILY MEDICINE CLINIC | Facility: CLINIC | Age: 59
End: 2019-01-28

## 2019-01-28 NOTE — TELEPHONE ENCOUNTER
The following message was received via [employer] customer service response:    Dr. Mathieu Crowe [facility] work is mailing me papers for disabled claim.  I will bring them into office for you to fill out when I receive them.               Gracie Wright

## 2019-02-06 ENCOUNTER — TELEPHONE (OUTPATIENT)
Dept: FAMILY MEDICINE CLINIC | Facility: CLINIC | Age: 59
End: 2019-02-06

## 2019-02-06 NOTE — TELEPHONE ENCOUNTER
PT WILL NOT BE PICKING UP ATTENDING PHYSICIAN REPORT PPW.  IT NOW NEEDS TO BE FAXED TO:    FAX: [phone]

## 2019-02-13 ENCOUNTER — HOSPITAL ENCOUNTER (OUTPATIENT)
Dept: MRI IMAGING | Facility: HOSPITAL | Age: 59
Discharge: HOME | End: 2019-02-13
Attending: INTERNAL MEDICINE
Payer: COMMERCIAL

## 2019-02-13 DIAGNOSIS — G89.29 CHRONIC PAIN OF RIGHT KNEE: ICD-10-CM

## 2019-02-13 DIAGNOSIS — M25.561 CHRONIC PAIN OF RIGHT KNEE: ICD-10-CM

## 2019-02-13 PROCEDURE — 73721 MRI JNT OF LWR EXTRE W/O DYE: CPT | Performed by: INTERNAL MEDICINE

## 2019-02-28 ENCOUNTER — TELEPHONE (OUTPATIENT)
Dept: FAMILY MEDICINE CLINIC | Facility: CLINIC | Age: 59
End: 2019-02-28

## 2019-02-28 RX ORDER — LISINOPRIL 40 MG/1
40 TABLET ORAL DAILY
Qty: 90 TABLET | Refills: 0 | Status: SHIPPED | OUTPATIENT
Start: 2019-02-28 | End: 2019-03-06

## 2019-02-28 RX ORDER — ESCITALOPRAM OXALATE 20 MG/1
20 TABLET ORAL DAILY
Qty: 90 TABLET | Refills: 1 | Status: SHIPPED | OUTPATIENT
Start: 2019-02-28 | End: 2019-10-21

## 2019-02-28 NOTE — TELEPHONE ENCOUNTER
The lexapro 20 mg is on back order. Would it be okay to change the script to  lexapro 10mg  2 x a day.

## 2019-03-02 ENCOUNTER — APPOINTMENT (OUTPATIENT)
Dept: MRI IMAGING | Facility: HOSPITAL | Age: 59
End: 2019-03-02
Attending: EMERGENCY MEDICINE
Payer: COMMERCIAL

## 2019-03-02 ENCOUNTER — HOSPITAL ENCOUNTER (EMERGENCY)
Facility: HOSPITAL | Age: 59
Discharge: HOME | End: 2019-03-02
Attending: EMERGENCY MEDICINE
Payer: COMMERCIAL

## 2019-03-02 VITALS
WEIGHT: 242 LBS | OXYGEN SATURATION: 95 % | DIASTOLIC BLOOD PRESSURE: 73 MMHG | TEMPERATURE: 99 F | HEIGHT: 66 IN | HEART RATE: 76 BPM | BODY MASS INDEX: 38.89 KG/M2 | SYSTOLIC BLOOD PRESSURE: 150 MMHG | RESPIRATION RATE: 18 BRPM

## 2019-03-02 DIAGNOSIS — R20.0 FACIAL NUMBNESS: Primary | ICD-10-CM

## 2019-03-02 LAB
ALBUMIN SERPL-MCNC: 3.6 G/DL (ref 3.4–5)
ALBUMIN/GLOB SERPL: 1 {RATIO} (ref 1–2)
ALP LIVER SERPL-CCNC: 99 U/L (ref 46–118)
ALT SERPL-CCNC: 41 U/L (ref 13–56)
ANION GAP SERPL CALC-SCNC: 9 MMOL/L (ref 0–18)
AST SERPL-CCNC: 22 U/L (ref 15–37)
BASOPHILS # BLD AUTO: 0.05 X10(3) UL (ref 0–0.2)
BASOPHILS NFR BLD AUTO: 0.6 %
BILIRUB SERPL-MCNC: 0.3 MG/DL (ref 0.1–2)
BUN BLD-MCNC: 17 MG/DL (ref 7–18)
BUN/CREAT SERPL: 21.3 (ref 10–20)
CALCIUM BLD-MCNC: 9.8 MG/DL (ref 8.5–10.1)
CHLORIDE SERPL-SCNC: 106 MMOL/L (ref 98–107)
CO2 SERPL-SCNC: 26 MMOL/L (ref 21–32)
CREAT BLD-MCNC: 0.8 MG/DL (ref 0.55–1.02)
DEPRECATED RDW RBC AUTO: 40 FL (ref 35.1–46.3)
EOSINOPHIL # BLD AUTO: 0.09 X10(3) UL (ref 0–0.7)
EOSINOPHIL NFR BLD AUTO: 1 %
ERYTHROCYTE [DISTWIDTH] IN BLOOD BY AUTOMATED COUNT: 12.1 % (ref 11–15)
GLOBULIN PLAS-MCNC: 3.6 G/DL (ref 2.8–4.4)
GLUCOSE BLD-MCNC: 114 MG/DL (ref 70–99)
HCT VFR BLD AUTO: 42.7 % (ref 35–48)
HGB BLD-MCNC: 14.3 G/DL (ref 12–16)
IMM GRANULOCYTES # BLD AUTO: 0.04 X10(3) UL (ref 0–1)
IMM GRANULOCYTES NFR BLD: 0.5 %
LYMPHOCYTES # BLD AUTO: 3 X10(3) UL (ref 1–4)
LYMPHOCYTES NFR BLD AUTO: 33.8 %
M PROTEIN MFR SERPL ELPH: 7.2 G/DL (ref 6.4–8.2)
MCH RBC QN AUTO: 30.1 PG (ref 26–34)
MCHC RBC AUTO-ENTMCNC: 33.5 G/DL (ref 31–37)
MCV RBC AUTO: 89.9 FL (ref 80–100)
MONOCYTES # BLD AUTO: 0.75 X10(3) UL (ref 0.1–1)
MONOCYTES NFR BLD AUTO: 8.5 %
NEUTROPHILS # BLD AUTO: 4.94 X10 (3) UL (ref 1.5–7.7)
NEUTROPHILS # BLD AUTO: 4.94 X10(3) UL (ref 1.5–7.7)
NEUTROPHILS NFR BLD AUTO: 55.6 %
OSMOLALITY SERPL CALC.SUM OF ELEC: 294 MOSM/KG (ref 275–295)
PLATELET # BLD AUTO: 299 10(3)UL (ref 150–450)
POTASSIUM SERPL-SCNC: 4.3 MMOL/L (ref 3.5–5.1)
RBC # BLD AUTO: 4.75 X10(6)UL (ref 3.8–5.3)
SODIUM SERPL-SCNC: 141 MMOL/L (ref 136–145)
WBC # BLD AUTO: 8.9 X10(3) UL (ref 4–11)

## 2019-03-02 PROCEDURE — 99285 EMERGENCY DEPT VISIT HI MDM: CPT

## 2019-03-02 PROCEDURE — 80053 COMPREHEN METABOLIC PANEL: CPT | Performed by: EMERGENCY MEDICINE

## 2019-03-02 PROCEDURE — 70546 MR ANGIOGRAPH HEAD W/O&W/DYE: CPT | Performed by: EMERGENCY MEDICINE

## 2019-03-02 PROCEDURE — A9575 INJ GADOTERATE MEGLUMI 0.1ML: HCPCS | Performed by: EMERGENCY MEDICINE

## 2019-03-02 PROCEDURE — 93010 ELECTROCARDIOGRAM REPORT: CPT

## 2019-03-02 PROCEDURE — 36415 COLL VENOUS BLD VENIPUNCTURE: CPT

## 2019-03-02 PROCEDURE — 70553 MRI BRAIN STEM W/O & W/DYE: CPT | Performed by: EMERGENCY MEDICINE

## 2019-03-02 PROCEDURE — 93005 ELECTROCARDIOGRAM TRACING: CPT

## 2019-03-02 PROCEDURE — 85025 COMPLETE CBC W/AUTO DIFF WBC: CPT | Performed by: EMERGENCY MEDICINE

## 2019-03-02 PROCEDURE — 70549 MR ANGIOGRAPH NECK W/O&W/DYE: CPT | Performed by: EMERGENCY MEDICINE

## 2019-03-02 NOTE — ED INITIAL ASSESSMENT (HPI)
Pt presents to ER with left side of mouth numbness starting at 1100, when she work up after work. Pt states numbness in mouth continues to get worse. Numbness to left side of tongue, lips, and cheek. Speech is clear.  This is pts 2nd incident with this type

## 2019-03-03 LAB
ATRIAL RATE: 77 BPM
P AXIS: 45 DEGREES
P-R INTERVAL: 144 MS
Q-T INTERVAL: 396 MS
QRS DURATION: 88 MS
QTC CALCULATION (BEZET): 448 MS
R AXIS: 4 DEGREES
T AXIS: 50 DEGREES
VENTRICULAR RATE: 77 BPM

## 2019-03-03 NOTE — ED PROVIDER NOTES
Patient Seen in: [facility] Emergency Department    History   Patient presents with:  Numbness Weakness (neurologic)    Stated Complaint: numbness to left side of mouth/face since 11am    HPI    There is a 47-year-old female who presents to the [address] [03/02/19 1723]   /59   Pulse 81   Resp 18   Temp 98.7 °F (37.1 °C)   Temp src Temporal   SpO2 94 %   O2 Device None (Room air)       Current:/73   Pulse 76   Temp 98.7 °F (37.1 °C) (Temporal)   Resp 18   Ht 167.6 cm (5' 6\")   Wt 109.8 kg   Sp -----------         ------                     CBC W/ DIFFERENTIAL[[id number]]                              Final result                 Please view results for these tests on the individual orders.    [address] [facility] cause though in the differential diagnosis would include complex migraines. Patient was informed to return if she had any worsening symptoms such as focal weakness in the extremities, alteration in mental status, syncope, intractable vomiting, or pain.   Tito Flores

## 2019-03-06 ENCOUNTER — OFFICE VISIT (OUTPATIENT)
Dept: FAMILY MEDICINE CLINIC | Facility: CLINIC | Age: 59
End: 2019-03-06
Payer: COMMERCIAL

## 2019-03-06 VITALS
WEIGHT: 245 LBS | SYSTOLIC BLOOD PRESSURE: 130 MMHG | TEMPERATURE: 97 F | DIASTOLIC BLOOD PRESSURE: 60 MMHG | OXYGEN SATURATION: 97 % | HEART RATE: 75 BPM | BODY MASS INDEX: 40 KG/M2

## 2019-03-06 DIAGNOSIS — N32.89 SPASTIC BLADDER: ICD-10-CM

## 2019-03-06 DIAGNOSIS — Z12.39 SCREENING FOR BREAST CANCER: Primary | ICD-10-CM

## 2019-03-06 DIAGNOSIS — R20.0 NUMBNESS: ICD-10-CM

## 2019-03-06 PROCEDURE — 1111F DSCHRG MED/CURRENT MED MERGE: CPT | Performed by: INTERNAL MEDICINE

## 2019-03-06 PROCEDURE — 99214 OFFICE O/P EST MOD 30 MIN: CPT | Performed by: INTERNAL MEDICINE

## 2019-03-06 RX ORDER — LISINOPRIL 40 MG/1
40 TABLET ORAL DAILY
Qty: 90 TABLET | Refills: 0 | Status: SHIPPED | OUTPATIENT
Start: 2019-03-06 | End: 2020-10-14

## 2019-03-06 RX ORDER — SIMVASTATIN 40 MG
40 TABLET ORAL NIGHTLY
Qty: 90 TABLET | Refills: 0 | Status: SHIPPED | OUTPATIENT
Start: 2019-03-06 | End: 2019-03-08

## 2019-03-06 RX ORDER — OXYBUTYNIN CHLORIDE 10 MG/1
10 TABLET, EXTENDED RELEASE ORAL DAILY
Qty: 30 TABLET | Refills: 3 | Status: SHIPPED | OUTPATIENT
Start: 2019-03-06 | End: 2019-06-28

## 2019-03-06 NOTE — PROGRESS NOTES
Ofe Bueno is a 66year old female. HPI:   Pt has numbness in the left lateral tongue like she got some Novocain. She presented to the ER and had a workup, including CBC and CMP and MRI/A. No restricted diffusion to suggest acute ischemia.   Probable Sitting, Cuff Size: large)   Pulse 75   Temp 97.4 °F (36.3 °C) (Temporal)   Wt 245 lb   SpO2 97%   BMI 39.54 kg/m²   GENERAL: well developed, well nourished,in no apparent distress  SKIN: no rashes,no suspicious lesions  HEENT: atraumatic, normocephalic,ea

## 2019-03-07 ENCOUNTER — LABORATORY ENCOUNTER (OUTPATIENT)
Dept: LAB | Age: 59
End: 2019-03-07
Attending: INTERNAL MEDICINE
Payer: COMMERCIAL

## 2019-03-07 DIAGNOSIS — Z00.00 WELL ADULT EXAM: ICD-10-CM

## 2019-03-07 LAB
ALBUMIN SERPL-MCNC: 3.5 G/DL (ref 3.4–5)
ALBUMIN/GLOB SERPL: 1.1 {RATIO} (ref 1–2)
ALP LIVER SERPL-CCNC: 91 U/L (ref 46–118)
ALT SERPL-CCNC: 33 U/L (ref 13–56)
ANION GAP SERPL CALC-SCNC: 6 MMOL/L (ref 0–18)
AST SERPL-CCNC: 19 U/L (ref 15–37)
BASOPHILS # BLD AUTO: 0.04 X10(3) UL (ref 0–0.2)
BASOPHILS NFR BLD AUTO: 0.6 %
BILIRUB SERPL-MCNC: 0.6 MG/DL (ref 0.1–2)
BUN BLD-MCNC: 20 MG/DL (ref 7–18)
BUN/CREAT SERPL: 29.9 (ref 10–20)
CALCIUM BLD-MCNC: 8.6 MG/DL (ref 8.5–10.1)
CHLORIDE SERPL-SCNC: 109 MMOL/L (ref 98–107)
CHOLEST SMN-MCNC: 212 MG/DL (ref ?–200)
CO2 SERPL-SCNC: 25 MMOL/L (ref 21–32)
CREAT BLD-MCNC: 0.67 MG/DL (ref 0.55–1.02)
DEPRECATED RDW RBC AUTO: 40.2 FL (ref 35.1–46.3)
EOSINOPHIL # BLD AUTO: 0.1 X10(3) UL (ref 0–0.7)
EOSINOPHIL NFR BLD AUTO: 1.5 %
ERYTHROCYTE [DISTWIDTH] IN BLOOD BY AUTOMATED COUNT: 11.9 % (ref 11–15)
GLOBULIN PLAS-MCNC: 3.3 G/DL (ref 2.8–4.4)
GLUCOSE BLD-MCNC: 89 MG/DL (ref 70–99)
HCT VFR BLD AUTO: 41.8 % (ref 35–48)
HDLC SERPL-MCNC: 63 MG/DL (ref 40–59)
HGB BLD-MCNC: 13.8 G/DL (ref 12–16)
IMM GRANULOCYTES # BLD AUTO: 0.02 X10(3) UL (ref 0–1)
IMM GRANULOCYTES NFR BLD: 0.3 %
LDLC SERPL CALC-MCNC: 123 MG/DL (ref ?–100)
LYMPHOCYTES # BLD AUTO: 2.4 X10(3) UL (ref 1–4)
LYMPHOCYTES NFR BLD AUTO: 36 %
M PROTEIN MFR SERPL ELPH: 6.8 G/DL (ref 6.4–8.2)
MCH RBC QN AUTO: 30.1 PG (ref 26–34)
MCHC RBC AUTO-ENTMCNC: 33 G/DL (ref 31–37)
MCV RBC AUTO: 91.3 FL (ref 80–100)
MONOCYTES # BLD AUTO: 0.42 X10(3) UL (ref 0.1–1)
MONOCYTES NFR BLD AUTO: 6.3 %
NEUTROPHILS # BLD AUTO: 3.69 X10 (3) UL (ref 1.5–7.7)
NEUTROPHILS # BLD AUTO: 3.69 X10(3) UL (ref 1.5–7.7)
NEUTROPHILS NFR BLD AUTO: 55.3 %
NONHDLC SERPL-MCNC: 149 MG/DL (ref ?–130)
OSMOLALITY SERPL CALC.SUM OF ELEC: 292 MOSM/KG (ref 275–295)
PLATELET # BLD AUTO: 337 10(3)UL (ref 150–450)
POTASSIUM SERPL-SCNC: 4.6 MMOL/L (ref 3.5–5.1)
RBC # BLD AUTO: 4.58 X10(6)UL (ref 3.8–5.3)
SODIUM SERPL-SCNC: 140 MMOL/L (ref 136–145)
TRIGL SERPL-MCNC: 132 MG/DL (ref 30–149)
VLDLC SERPL CALC-MCNC: 26 MG/DL (ref 0–30)
WBC # BLD AUTO: 6.7 X10(3) UL (ref 4–11)

## 2019-03-07 PROCEDURE — 80053 COMPREHEN METABOLIC PANEL: CPT | Performed by: INTERNAL MEDICINE

## 2019-03-07 PROCEDURE — 80061 LIPID PANEL: CPT | Performed by: INTERNAL MEDICINE

## 2019-03-07 PROCEDURE — 85025 COMPLETE CBC W/AUTO DIFF WBC: CPT | Performed by: INTERNAL MEDICINE

## 2019-03-07 PROCEDURE — 36415 COLL VENOUS BLD VENIPUNCTURE: CPT | Performed by: INTERNAL MEDICINE

## 2019-03-08 ENCOUNTER — TELEPHONE (OUTPATIENT)
Dept: FAMILY MEDICINE CLINIC | Facility: CLINIC | Age: 59
End: 2019-03-08

## 2019-03-12 ENCOUNTER — MED REC SCAN ONLY (OUTPATIENT)
Dept: FAMILY MEDICINE CLINIC | Facility: CLINIC | Age: 59
End: 2019-03-12

## 2019-03-12 NOTE — TELEPHONE ENCOUNTER
Patient said she returned to work February 2nd, paperwork faxed to [facility] [employer] [facility] [employer] [phone]

## 2019-05-07 ENCOUNTER — HOSPITAL ENCOUNTER (OUTPATIENT)
Dept: GENERAL RADIOLOGY | Age: 59
Discharge: HOME | End: 2019-05-07
Attending: ORTHOPAEDIC SURGERY
Payer: COMMERCIAL

## 2019-05-07 DIAGNOSIS — Z01.89 ENCOUNTER FOR LOWER EXTREMITY COMPARISON IMAGING STUDY: ICD-10-CM

## 2019-05-07 DIAGNOSIS — M17.11 PRIMARY OSTEOARTHRITIS OF RIGHT KNEE: ICD-10-CM

## 2019-05-07 PROBLEM — S83.241D ACUTE MEDIAL MENISCUS TEAR OF RIGHT KNEE, SUBSEQUENT ENCOUNTER: Status: ACTIVE | Noted: 2019-05-07

## 2019-05-07 PROCEDURE — 73564 X-RAY EXAM KNEE 4 OR MORE: CPT | Performed by: ORTHOPAEDIC SURGERY

## 2019-05-07 PROCEDURE — 73562 X-RAY EXAM OF KNEE 3: CPT | Performed by: ORTHOPAEDIC SURGERY

## 2019-05-20 ENCOUNTER — APPOINTMENT (OUTPATIENT)
Dept: LAB | Facility: HOSPITAL | Age: 59
End: 2019-05-20
Attending: ORTHOPAEDIC SURGERY
Payer: COMMERCIAL

## 2019-05-20 ENCOUNTER — HOSPITAL ENCOUNTER (OUTPATIENT)
Dept: PHYSICAL THERAPY | Facility: HOSPITAL | Age: 59
Discharge: HOME | End: 2019-05-20
Attending: ORTHOPAEDIC SURGERY
Payer: COMMERCIAL

## 2019-05-20 DIAGNOSIS — M17.11 PRIMARY OSTEOARTHRITIS OF RIGHT KNEE: ICD-10-CM

## 2019-05-23 ENCOUNTER — LABORATORY ENCOUNTER (OUTPATIENT)
Dept: LAB | Facility: HOSPITAL | Age: 59
End: 2019-05-23
Attending: ORTHOPAEDIC SURGERY
Payer: COMMERCIAL

## 2019-05-23 ENCOUNTER — TELEPHONE (OUTPATIENT)
Dept: CASE MANAGEMENT | Facility: HOSPITAL | Age: 59
End: 2019-05-23

## 2019-05-23 DIAGNOSIS — M17.11 PRIMARY OSTEOARTHRITIS OF RIGHT KNEE: ICD-10-CM

## 2019-05-23 PROCEDURE — 81003 URINALYSIS AUTO W/O SCOPE: CPT

## 2019-05-23 PROCEDURE — 86850 RBC ANTIBODY SCREEN: CPT

## 2019-05-23 PROCEDURE — 86900 BLOOD TYPING SEROLOGIC ABO: CPT

## 2019-05-23 PROCEDURE — 36415 COLL VENOUS BLD VENIPUNCTURE: CPT

## 2019-05-23 PROCEDURE — 87081 CULTURE SCREEN ONLY: CPT

## 2019-05-23 PROCEDURE — 85610 PROTHROMBIN TIME: CPT

## 2019-05-23 PROCEDURE — 85025 COMPLETE CBC W/AUTO DIFF WBC: CPT

## 2019-05-23 PROCEDURE — 93010 ELECTROCARDIOGRAM REPORT: CPT | Performed by: INTERNAL MEDICINE

## 2019-05-23 PROCEDURE — 93005 ELECTROCARDIOGRAM TRACING: CPT

## 2019-05-23 PROCEDURE — 85730 THROMBOPLASTIN TIME PARTIAL: CPT

## 2019-05-23 PROCEDURE — 80053 COMPREHEN METABOLIC PANEL: CPT

## 2019-05-23 PROCEDURE — 86901 BLOOD TYPING SEROLOGIC RH(D): CPT

## 2019-05-23 NOTE — PROGRESS NOTES
sw received call from pt inquiring about dc planning upon DC. Pt resides in a one level apartment with 16 stairs to enter. pts dtr resides with her but will be working and olga be in and out. pts other dtr resides nearby and will also be in and out to help.

## 2019-06-01 RX ORDER — LISINOPRIL 40 MG/1
TABLET ORAL
Qty: 90 TABLET | Refills: 1 | Status: ON HOLD | OUTPATIENT
Start: 2019-06-01 | End: 2019-06-05

## 2019-06-01 NOTE — TELEPHONE ENCOUNTER
Last office visit with PCP:  3/6/2019    Last CMP: 5/23/2019  Last B/P taken 3/6/2019: 130/60    Last refill: 3/06/2019  Requested Prescriptions     Pending Prescriptions Disp Refills   • LISINOPRIL 40 MG Oral Tab [Pharmacy Med Name: Lisinopril 40 Mg Tab L

## 2019-06-03 RX ORDER — LISINOPRIL 40 MG/1
TABLET ORAL
Qty: 90 TABLET | Refills: 0 | OUTPATIENT
Start: 2019-06-03

## 2019-06-04 NOTE — H&P
[facility] Patient Status:  Surgery Admit - Inpt    3/23/1960 MRN [id number]   Location [address] [facility] Attending Derrell Zaragoza MD   Hosp Day # 0 PCP Tanya Smith MD     Date of Admission:  (Not on non-distended, non-tender, with no rebound or guarding. Extremities:  No lower extremity edema noted. Without clubbing or cyanosis.     The right knee is tender at the medial and lateral joint lines, with crepitus on range of motion    Laboratory Data:

## 2019-06-05 ENCOUNTER — ANESTHESIA EVENT (OUTPATIENT)
Dept: SURGERY | Facility: HOSPITAL | Age: 59
DRG: 470 | End: 2019-06-05
Payer: COMMERCIAL

## 2019-06-05 ENCOUNTER — ANESTHESIA (OUTPATIENT)
Dept: SURGERY | Facility: HOSPITAL | Age: 59
DRG: 470 | End: 2019-06-05
Payer: COMMERCIAL

## 2019-06-05 ENCOUNTER — HOSPITAL ENCOUNTER (INPATIENT)
Facility: HOSPITAL | Age: 59
LOS: 2 days | Discharge: HOME | DRG: 470 | End: 2019-06-07
Attending: ORTHOPAEDIC SURGERY | Admitting: ORTHOPAEDIC SURGERY
Payer: COMMERCIAL

## 2019-06-05 DIAGNOSIS — M17.11 PRIMARY OSTEOARTHRITIS OF RIGHT KNEE: Primary | ICD-10-CM

## 2019-06-05 PROCEDURE — 3E0T3BZ INTRODUCTION OF ANESTHETIC AGENT INTO PERIPHERAL NERVES AND PLEXI, PERCUTANEOUS APPROACH: ICD-10-PCS | Performed by: ANESTHESIOLOGY

## 2019-06-05 PROCEDURE — 0SRC0J9 REPLACEMENT OF RIGHT KNEE JOINT WITH SYNTHETIC SUBSTITUTE, CEMENTED, OPEN APPROACH: ICD-10-PCS | Performed by: ORTHOPAEDIC SURGERY

## 2019-06-05 PROCEDURE — 99232 SBSQ HOSP IP/OBS MODERATE 35: CPT | Performed by: HOSPITALIST

## 2019-06-05 DEVICE — SMARTSET HV HIGH VISCOSITY BONE CEMENT 40G
Type: IMPLANTABLE DEVICE | Site: KNEE | Status: FUNCTIONAL
Brand: SMARTSET

## 2019-06-05 DEVICE — ATTUNE KNEE SYSTEM FEMORAL POSTERIOR STABILIZED SIZE 6 RIGHT CEMENTED
Type: IMPLANTABLE DEVICE | Site: KNEE | Status: FUNCTIONAL
Brand: ATTUNE

## 2019-06-05 DEVICE — ATTUNE KNEE SYSTEM TIBIAL INSERT ROTATING PLATFORM POSTERIOR STABILIZED 6 7MM AOX
Type: IMPLANTABLE DEVICE | Site: KNEE | Status: FUNCTIONAL
Brand: ATTUNE

## 2019-06-05 DEVICE — ATTUNE PATELLA MEDIALIZED DOME 38MM CEMENTED AOX
Type: IMPLANTABLE DEVICE | Site: KNEE | Status: FUNCTIONAL
Brand: ATTUNE

## 2019-06-05 DEVICE — ATTUNE KNEE SYSTEM TIBIAL BASE ROTATING PLATFORM SIZE 6 CEMENTED
Type: IMPLANTABLE DEVICE | Site: KNEE | Status: FUNCTIONAL
Brand: ATTUNE

## 2019-06-05 RX ORDER — METOCLOPRAMIDE HYDROCHLORIDE 5 MG/ML
10 INJECTION INTRAMUSCULAR; INTRAVENOUS EVERY 6 HOURS PRN
Status: ACTIVE | OUTPATIENT
Start: 2019-06-05 | End: 2019-06-07

## 2019-06-05 RX ORDER — BISACODYL 10 MG
10 SUPPOSITORY, RECTAL RECTAL
Status: DISCONTINUED | OUTPATIENT
Start: 2019-06-05 | End: 2019-06-07

## 2019-06-05 RX ORDER — SODIUM CHLORIDE, SODIUM LACTATE, POTASSIUM CHLORIDE, CALCIUM CHLORIDE 600; 310; 30; 20 MG/100ML; MG/100ML; MG/100ML; MG/100ML
INJECTION, SOLUTION INTRAVENOUS CONTINUOUS
Status: DISCONTINUED | OUTPATIENT
Start: 2019-06-05 | End: 2019-06-05 | Stop reason: HOSPADM

## 2019-06-05 RX ORDER — HYDROCODONE BITARTRATE AND ACETAMINOPHEN 10; 325 MG/1; MG/1
2 TABLET ORAL AS NEEDED
Status: DISCONTINUED | OUTPATIENT
Start: 2019-06-05 | End: 2019-06-05 | Stop reason: HOSPADM

## 2019-06-05 RX ORDER — HYDROMORPHONE HYDROCHLORIDE 1 MG/ML
0.4 INJECTION, SOLUTION INTRAMUSCULAR; INTRAVENOUS; SUBCUTANEOUS EVERY 2 HOUR PRN
Status: ACTIVE | OUTPATIENT
Start: 2019-06-05 | End: 2019-06-07

## 2019-06-05 RX ORDER — ONDANSETRON 2 MG/ML
4 INJECTION INTRAMUSCULAR; INTRAVENOUS EVERY 4 HOURS PRN
Status: DISPENSED | OUTPATIENT
Start: 2019-06-05 | End: 2019-06-07

## 2019-06-05 RX ORDER — CEFAZOLIN SODIUM/WATER 2 G/20 ML
2 SYRINGE (ML) INTRAVENOUS EVERY 8 HOURS
Status: COMPLETED | OUTPATIENT
Start: 2019-06-05 | End: 2019-06-06

## 2019-06-05 RX ORDER — TIZANIDINE 4 MG/1
4 TABLET ORAL 3 TIMES DAILY PRN
Status: DISCONTINUED | OUTPATIENT
Start: 2019-06-05 | End: 2019-06-07

## 2019-06-05 RX ORDER — ONDANSETRON 2 MG/ML
4 INJECTION INTRAMUSCULAR; INTRAVENOUS AS NEEDED
Status: DISCONTINUED | OUTPATIENT
Start: 2019-06-05 | End: 2019-06-05 | Stop reason: HOSPADM

## 2019-06-05 RX ORDER — ACETAMINOPHEN 325 MG/1
TABLET ORAL
Status: COMPLETED
Start: 2019-06-05 | End: 2019-06-05

## 2019-06-05 RX ORDER — MIDAZOLAM HYDROCHLORIDE 1 MG/ML
1 INJECTION INTRAMUSCULAR; INTRAVENOUS EVERY 5 MIN PRN
Status: DISCONTINUED | OUTPATIENT
Start: 2019-06-05 | End: 2019-06-05 | Stop reason: HOSPADM

## 2019-06-05 RX ORDER — MEPERIDINE HYDROCHLORIDE 25 MG/ML
12.5 INJECTION INTRAMUSCULAR; INTRAVENOUS; SUBCUTANEOUS AS NEEDED
Status: DISCONTINUED | OUTPATIENT
Start: 2019-06-05 | End: 2019-06-05 | Stop reason: HOSPADM

## 2019-06-05 RX ORDER — ZOLPIDEM TARTRATE 5 MG/1
5 TABLET ORAL NIGHTLY PRN
Status: DISCONTINUED | OUTPATIENT
Start: 2019-06-05 | End: 2019-06-07

## 2019-06-05 RX ORDER — SODIUM CHLORIDE, SODIUM LACTATE, POTASSIUM CHLORIDE, CALCIUM CHLORIDE 600; 310; 30; 20 MG/100ML; MG/100ML; MG/100ML; MG/100ML
INJECTION, SOLUTION INTRAVENOUS CONTINUOUS
Status: DISCONTINUED | OUTPATIENT
Start: 2019-06-05 | End: 2019-06-07

## 2019-06-05 RX ORDER — PROCHLORPERAZINE EDISYLATE 5 MG/ML
10 INJECTION INTRAMUSCULAR; INTRAVENOUS EVERY 6 HOURS PRN
Status: ACTIVE | OUTPATIENT
Start: 2019-06-05 | End: 2019-06-07

## 2019-06-05 RX ORDER — DIPHENHYDRAMINE HCL 25 MG
25 CAPSULE ORAL EVERY 4 HOURS PRN
Status: DISCONTINUED | OUTPATIENT
Start: 2019-06-05 | End: 2019-06-07

## 2019-06-05 RX ORDER — DIPHENHYDRAMINE HYDROCHLORIDE 50 MG/ML
12.5 INJECTION INTRAMUSCULAR; INTRAVENOUS EVERY 4 HOURS PRN
Status: DISCONTINUED | OUTPATIENT
Start: 2019-06-05 | End: 2019-06-07

## 2019-06-05 RX ORDER — HYDROMORPHONE HYDROCHLORIDE 1 MG/ML
0.4 INJECTION, SOLUTION INTRAMUSCULAR; INTRAVENOUS; SUBCUTANEOUS EVERY 5 MIN PRN
Status: DISCONTINUED | OUTPATIENT
Start: 2019-06-05 | End: 2019-06-05 | Stop reason: HOSPADM

## 2019-06-05 RX ORDER — HYDROMORPHONE HYDROCHLORIDE 1 MG/ML
0.2 INJECTION, SOLUTION INTRAMUSCULAR; INTRAVENOUS; SUBCUTANEOUS EVERY 2 HOUR PRN
Status: ACTIVE | OUTPATIENT
Start: 2019-06-05 | End: 2019-06-07

## 2019-06-05 RX ORDER — OXYCODONE HCL 10 MG/1
10 TABLET, FILM COATED, EXTENDED RELEASE ORAL
Status: COMPLETED | OUTPATIENT
Start: 2019-06-05 | End: 2019-06-05

## 2019-06-05 RX ORDER — METOCLOPRAMIDE HYDROCHLORIDE 5 MG/ML
10 INJECTION INTRAMUSCULAR; INTRAVENOUS AS NEEDED
Status: DISCONTINUED | OUTPATIENT
Start: 2019-06-05 | End: 2019-06-05 | Stop reason: HOSPADM

## 2019-06-05 RX ORDER — DIPHENHYDRAMINE HYDROCHLORIDE 50 MG/ML
25 INJECTION INTRAMUSCULAR; INTRAVENOUS ONCE AS NEEDED
Status: ACTIVE | OUTPATIENT
Start: 2019-06-05 | End: 2019-06-05

## 2019-06-05 RX ORDER — HYDROCODONE BITARTRATE AND ACETAMINOPHEN 10; 325 MG/1; MG/1
1 TABLET ORAL AS NEEDED
Status: DISCONTINUED | OUTPATIENT
Start: 2019-06-05 | End: 2019-06-05 | Stop reason: HOSPADM

## 2019-06-05 RX ORDER — NALOXONE HYDROCHLORIDE 0.4 MG/ML
80 INJECTION, SOLUTION INTRAMUSCULAR; INTRAVENOUS; SUBCUTANEOUS AS NEEDED
Status: DISCONTINUED | OUTPATIENT
Start: 2019-06-05 | End: 2019-06-05 | Stop reason: HOSPADM

## 2019-06-05 RX ORDER — SCOLOPAMINE TRANSDERMAL SYSTEM 1 MG/1
1 PATCH, EXTENDED RELEASE TRANSDERMAL ONCE
Status: DISCONTINUED | OUTPATIENT
Start: 2019-06-05 | End: 2019-06-07

## 2019-06-05 RX ORDER — ACETAMINOPHEN 500 MG
1000 TABLET ORAL ONCE
Status: DISCONTINUED | OUTPATIENT
Start: 2019-06-05 | End: 2019-06-05

## 2019-06-05 RX ORDER — ACETAMINOPHEN 325 MG/1
650 TABLET ORAL 4 TIMES DAILY
Status: COMPLETED | OUTPATIENT
Start: 2019-06-05 | End: 2019-06-06

## 2019-06-05 RX ORDER — POLYETHYLENE GLYCOL 3350 17 G/17G
17 POWDER, FOR SOLUTION ORAL DAILY PRN
Status: DISCONTINUED | OUTPATIENT
Start: 2019-06-05 | End: 2019-06-07

## 2019-06-05 RX ORDER — SODIUM PHOSPHATE, DIBASIC AND SODIUM PHOSPHATE, MONOBASIC 7; 19 G/133ML; G/133ML
1 ENEMA RECTAL ONCE AS NEEDED
Status: DISCONTINUED | OUTPATIENT
Start: 2019-06-05 | End: 2019-06-07

## 2019-06-05 RX ORDER — ASPIRIN 325 MG
325 TABLET ORAL 2 TIMES DAILY
Status: DISCONTINUED | OUTPATIENT
Start: 2019-06-05 | End: 2019-06-07

## 2019-06-05 RX ORDER — MELATONIN
325
Status: DISCONTINUED | OUTPATIENT
Start: 2019-06-06 | End: 2019-06-07

## 2019-06-05 RX ORDER — DOCUSATE SODIUM 100 MG/1
100 CAPSULE, LIQUID FILLED ORAL 2 TIMES DAILY
Status: DISCONTINUED | OUTPATIENT
Start: 2019-06-05 | End: 2019-06-07

## 2019-06-05 RX ORDER — OXYCODONE HYDROCHLORIDE 5 MG/1
5 TABLET ORAL EVERY 4 HOURS PRN
Status: DISPENSED | OUTPATIENT
Start: 2019-06-05 | End: 2019-06-07

## 2019-06-05 RX ORDER — OXYCODONE HYDROCHLORIDE 10 MG/1
10 TABLET ORAL EVERY 4 HOURS PRN
Status: DISPENSED | OUTPATIENT
Start: 2019-06-05 | End: 2019-06-07

## 2019-06-05 RX ORDER — HYDROCODONE BITARTRATE AND ACETAMINOPHEN 10; 325 MG/1; MG/1
1-2 TABLET ORAL EVERY 4 HOURS PRN
Qty: 60 TABLET | Refills: 0 | Status: SHIPPED | OUTPATIENT
Start: 2019-06-05 | End: 2019-09-09

## 2019-06-05 RX ORDER — OXYCODONE HYDROCHLORIDE 15 MG/1
15 TABLET ORAL EVERY 4 HOURS PRN
Status: ACTIVE | OUTPATIENT
Start: 2019-06-05 | End: 2019-06-07

## 2019-06-05 RX ORDER — CEFAZOLIN SODIUM/WATER 2 G/20 ML
2 SYRINGE (ML) INTRAVENOUS ONCE
Status: COMPLETED | OUTPATIENT
Start: 2019-06-05 | End: 2019-06-05

## 2019-06-05 RX ORDER — SENNOSIDES 8.6 MG
17.2 TABLET ORAL NIGHTLY
Status: DISCONTINUED | OUTPATIENT
Start: 2019-06-05 | End: 2019-06-07

## 2019-06-05 RX ORDER — HYDROMORPHONE HYDROCHLORIDE 1 MG/ML
0.8 INJECTION, SOLUTION INTRAMUSCULAR; INTRAVENOUS; SUBCUTANEOUS EVERY 2 HOUR PRN
Status: ACTIVE | OUTPATIENT
Start: 2019-06-05 | End: 2019-06-07

## 2019-06-05 NOTE — PROGRESS NOTES
[facility]  [address] Patient Status:  Inpatient    3/23/1960 MRN [id number]   [facility] 3SW-A Attending Lizzie Grant MD   Hosp Day # 0 PCP Karolyn Warner MD     Reason for consult: Medical management     Re Take 1 tablet (75 mg total) by mouth 2 (two) times daily. Disp: 60 tablet Rfl: 1   atorvastatin 40 MG Oral Tab Take 1 tablet (40 mg total) by mouth nightly.  Disp: 90 tablet Rfl: 0   Oxybutynin Chloride ER 10 MG Oral Tablet 24 Hr Take 1 tablet (10 mg total) reviewed in Epic. ASSESSMENT / PLAN:     1. Arthritis s/p R total knee arthroplasty 6/5   1. Ortho  2. Pain control   3. PT/OT   2. HTN  1. Cont home meds   3. HLD  1. Cont statin   4. Obesity,   1. Opt weight loss clinic referral   5.  Probable AYAZ  1

## 2019-06-05 NOTE — PLAN OF CARE
S/p right total knee. Arrived to floor from PACU. RA, VSS. Denies pain at this time. N/T to right thigh. Denies n/t to LLE. Dorsi/plantarflexion strong bilaterally. ASA BID to start tonight. DTV. Placed on regular diet, denies nausea.  Ace wrap, ice wrap an

## 2019-06-05 NOTE — PHYSICAL THERAPY NOTE
Attempted to see Pt this afternoon, however, Pt reports fatigue and difficulty keeping eyes open. Pt politely requesting PT follow up tomorrow.

## 2019-06-05 NOTE — ANESTHESIA PREPROCEDURE EVALUATION
PRE-OP EVALUATION    Patient Name: Ovi Kellogg    Pre-op Diagnosis: Primary osteoarthritis of right knee [M17.11]    Procedure(s):  RIGHT TOTAL KNEE REPLACEMENT    Surgeon(s) and Role:     Daniel Meraz MD - Primary    Pre-op vitals reviewed.   Te Endo/Other                                  Pulmonary                    (+) sleep apnea       Neuro/Psych             (+) TIA                       Past Surgical History:   Procedure Laterality Date   • ADDED FINGER SURGERY     • CHOLECYSTECT

## 2019-06-05 NOTE — INTERVAL H&P NOTE
Pre-op Diagnosis: Primary osteoarthritis of right knee [M17.11]    The above referenced H&P was reviewed by Arianne Perez MD on 6/5/2019, the patient was examined and no significant changes have occurred in the patient's condition since the H&P was perf

## 2019-06-05 NOTE — ANESTHESIA POSTPROCEDURE EVALUATION
[address] Patient Status:  Surgery Admit - Inpt   Age/Gender 64year old female MRN [id number]   [facility] SURGERY Attending Doc Mock MD   Hosp Day # 0 PCP Loly Dozier MD       Anesthesia Post-op Note    Pr

## 2019-06-05 NOTE — OPERATIVE REPORT
DATE OF PROCEDURE:  6/5/2019  PREOPERATIVE DIAGNOSIS: Right knee osteoarthritis. POSTOPERATIVE DIAGNOSIS: Right knee osteoarthritis. PROCEDURE PERFORMED: Cemented right total knee arthroplasty. SURGEON:  Hillary Tejada M.D.   FIRST ASSISTANT: Meg Mazariegos and extension, good tracking of the patella. Distal femoral condyle drill holes were made using the trial template. Final components the same size as the trials were utilized. Trial components were removed. Bony surfaces were irrigated and dried.  Final

## 2019-06-06 PROCEDURE — 99232 SBSQ HOSP IP/OBS MODERATE 35: CPT | Performed by: HOSPITALIST

## 2019-06-06 RX ORDER — ESCITALOPRAM OXALATE 20 MG/1
20 TABLET ORAL DAILY
Status: DISCONTINUED | OUTPATIENT
Start: 2019-06-06 | End: 2019-06-07

## 2019-06-06 RX ORDER — HYDROCODONE BITARTRATE AND ACETAMINOPHEN 10; 325 MG/1; MG/1
1 TABLET ORAL EVERY 4 HOURS PRN
Status: DISCONTINUED | OUTPATIENT
Start: 2019-06-07 | End: 2019-06-07

## 2019-06-06 RX ORDER — ATORVASTATIN CALCIUM 40 MG/1
40 TABLET, FILM COATED ORAL NIGHTLY
Status: DISCONTINUED | OUTPATIENT
Start: 2019-06-06 | End: 2019-06-07

## 2019-06-06 RX ORDER — HYDROCODONE BITARTRATE AND ACETAMINOPHEN 10; 325 MG/1; MG/1
2 TABLET ORAL EVERY 4 HOURS PRN
Status: DISCONTINUED | OUTPATIENT
Start: 2019-06-07 | End: 2019-06-07

## 2019-06-06 NOTE — OCCUPATIONAL THERAPY NOTE
OCCUPATIONAL THERAPY QUICK EVALUATION - INPATIENT    Room Number: 378/378-A  Evaluation Date: 6/6/2019     Type of Evaluation: Quick Eval  Presenting Problem: s/p R TKA 6/5/19    Physician Order: IP Consult to Occupational Therapy  Reason for Therapy:  ADL mechanics;Breathing techniques;Relaxation;Repositioning    COGNITION  WFL    RANGE OF MOTION AND STRENGTH ASSESSMENT  Upper extremity ROM is within functional limits     Upper extremity strength is within functional limits     NEUROLOGICAL FINDINGS  Neurol role, safety, fall prevention, pain management, shower transfers with good verbal understanding. Patient End of Session: Up in chair; With [address] [facility] [address] staff;Needs met;RN aware of session/findings; All patient questions and concerns addressed    ASSESSMENT   Patie supervision at home  Patient/Caregiver able to demonstrate safety with ADLS: At supervision level or above; patient reports will have supervision at home

## 2019-06-06 NOTE — PHYSICAL THERAPY NOTE
PHYSICAL THERAPY KNEE TREATMENT NOTE - INPATIENT     Room Number: 378/378-A     Session: 1 and 2   Number of Visits to Meet Established Goals: 3    Presenting Problem: s/p cemented right TKA 6/6/19    Problem List  Active Problems:    * No active hospital Score:  Raw Score: 20   Approx Degree of Impairment: 35.83%   Standardized Score (AM-PAC Scale): 47.67   CMS Modifier (G-Code): CJ    FUNCTIONAL ABILITY STATUS  Gait Assessment   Gait Assistance: Supervision  Distance (ft): 300  Assistive Device: Rolling w and concerns addressed    ASSESSMENT       Pt seen BID in PT group for gait training, stair/curb step training, and BLE strengthening: S/P R TKA.     At this time, Pt. presents with decreased balance, impaired strength, difficulty with gait/transfers result

## 2019-06-06 NOTE — PROGRESS NOTES
[address] Patient Status:  Inpatient    3/23/1960 MRN [id number]   [facility] 3SW-A Attending Ada Smith MD   Hosp Day # 1 PCP Bi Posey MD     Subjective:  S/P RIGHT Total Knee Arthroplasty  Systemic or

## 2019-06-06 NOTE — CM/SW NOTE
Call from [place] with [facility]. Agency able to accept pt.      Inez Jordan RN, [address] [facility] [address]

## 2019-06-06 NOTE — PLAN OF CARE
PT. C/O OF MODERATE POSTOP PAIN MANAGED WITH SCHEDULED TYLENOL AND PRN OXY IR 5; PT. WAS VERY DROWSY FROM OXY IR 10. PT.HAD TO BE BE STRAIGHT CATHED ON 6/5 AT 1930 FOR RETENTION AND WAS ABLE TO VOID PER BSC AROUND 0330. VSS. SURGICAL DRESSING CDI.  SAFETY ME

## 2019-06-06 NOTE — PROGRESS NOTES
SERENA HOSPITALIST  Progress Note     Meliza Mata Patient Status:  Inpatient    3/23/1960 MRN [id number]   [facility] 3SW-A Attending Derick Esparza MD   Hosp Day # 1 PCP Chaparrita Jennings MD     Chief Complaint: medical management     S docusate sodium  100 mg Oral BID   • ferrous sulfate  325 mg Oral Daily with breakfast   • aspirin  325 mg Oral BID   • acetaminophen  650 mg Oral QID       ASSESSMENT / PLAN:     1. Arthritis s/p R total knee arthroplasty 6/5   1. Ortho  2.  Pain control

## 2019-06-06 NOTE — PROGRESS NOTES
PT. ASSISTED TO BEDSIDE COMMODE TO VOID FOR FIRST TIME POSTOP. UNABLE TO VOID. PT. LIGHTHEADED . BP 94/63. PT. KNEE BUCKLING. PT. ASSISTED BACK TO BED . KNEE IMMOBILIZER ORDERED. PT. STRAIGHT CATHED BY KILEY RN. WILL CONTINUE TO MONITOR.

## 2019-06-06 NOTE — HOME CARE LIAISON
TNL REC'D VERBAL ORDER TO MEET WITH PTNT AND OFFER HH ON DC.  [facility] IS NOT ABLE TO SERVICE THE [facility] AT THIS TIME RE REFERRED PT TO [facility]. WAITING FOR APPROVAL FOR [employer]     Per allscripts  ptnt accepted by [facility]

## 2019-06-06 NOTE — PROGRESS NOTES
Patient and  attended group discharge education class. Patient taken back to her room midway through class due to increasing nausea. RN notified. Discharge education provided utilizing \"hip/knee replacement discharge instructions\" sheet.  Teach back

## 2019-06-06 NOTE — CM/SW NOTE
06/06/19 1200   CM/SW Referral Data   Referral Source Physician   Reason for Referral Discharge planning   Informant Patient   Pertinent Medical Hx   Primary Care Physician Name Devendra Akinschens   Patient Info   Patient's Mental Status Alert;Oriented   [place]

## 2019-06-07 VITALS
TEMPERATURE: 98 F | HEART RATE: 78 BPM | WEIGHT: 249.13 LBS | SYSTOLIC BLOOD PRESSURE: 140 MMHG | OXYGEN SATURATION: 93 % | RESPIRATION RATE: 14 BRPM | BODY MASS INDEX: 39.56 KG/M2 | HEIGHT: 66.5 IN | DIASTOLIC BLOOD PRESSURE: 55 MMHG

## 2019-06-07 PROCEDURE — 99232 SBSQ HOSP IP/OBS MODERATE 35: CPT | Performed by: HOSPITALIST

## 2019-06-07 NOTE — CM/SW NOTE
Sent AVS to [facility]     06/07/19 5045   Discharge disposition   Expected discharge disposition Home-Health   Name of Facillity/Home Care/Hospice   ([facility] )   Discharge transportation Private car

## 2019-06-07 NOTE — CM/SW NOTE
CHINO informed Olman Mckay from [facility] that the patient will be dc'd today. AVS will be sent via [facility] once available.     Suze Michel LCSW

## 2019-06-07 NOTE — PLAN OF CARE
Pt ok to dc home w [facility] pt/rn. Cm updated ok todc per pt/ot * im & ortho mds also. Pt verbalized understanding of poc & dc instructions given with rx for pain meds. Pt dced home.

## 2019-06-07 NOTE — PROGRESS NOTES
SERENA HOSPITALIST  Progress Note     Adamaris Hurt Patient Status:  Inpatient    3/23/1960 MRN [id number]   [facility] 3SW-A Attending Leeanna Haas MD   Hosp Day # 2 PCP Nichole Perez MD     Chief Complaint: medical management     S docusate sodium  100 mg Oral BID   • ferrous sulfate  325 mg Oral Daily with breakfast   • aspirin  325 mg Oral BID       ASSESSMENT / PLAN:     1. Arthritis s/p R total knee arthroplasty 6/5   1. Ortho  2. Pain control   3. PT/OT   2. HTN  1.  Can resume a

## 2019-06-07 NOTE — PHYSICAL THERAPY NOTE
PHYSICAL THERAPY KNEE TREATMENT NOTE - INPATIENT     Room Number: 378/378-A     Session: 3  Number of Visits to Meet Established Goals: 3    Presenting Problem: s/p cemented right TKA 6/5/19    Problem List  Active Problems:    * No active hospital proble 3-5 steps with a railing?: None       AM-PAC Score:  Raw Score: 24   Approx Degree of Impairment: 0%   Standardized Score (AM-PAC Scale): 61.14   CMS Modifier (G-Code): CH    FUNCTIONAL ABILITY STATUS  Gait Assessment   Gait Assistance: Modified independen impaired strength, difficulty with gait/transfers resulting in downgrade of overall functional mobility. Due to above deficits, Pt will benefit from continued IP PT, so that patient may achieve highest functional independence/return to baseline.  Recommend

## 2019-06-07 NOTE — PLAN OF CARE
Pt A&Ox4. On ra,  & scds in place. Tolerating diet. Bs active, passing gas. Pain controlled on pain meds. Vvs. Pt verbalized understanding of poc & call dont fall protocol. Will continue to monitor.

## 2019-06-07 NOTE — PROGRESS NOTES
Acute Pain Service    Post Op Day 2 Ortho Note    Assessed patient in chair. Rates pain 4/10 at rest and 5-6/10 with activity. Lawson Murillo is working well to manage pain; denies itching/nausea/dizziness.     Able to bear weight on sx leg; equal sensation i

## 2019-06-07 NOTE — PLAN OF CARE
PT. C/O OF SEVERE TO MODERATE POSTOP PAIN MANAGED WITH PRN OXY IR 10. VSS. SURGICAL DRESSING CDI. SAFETY MEASURES IN PLACE.

## 2019-06-07 NOTE — PROGRESS NOTES
[address] Patient Status:  Inpatient    3/23/1960 MRN [id number]   [facility] 3SW-A Attending Alejandro Puentes MD   Hosp Day # 2 PCP Sharath Diamond MD     Subjective:  S/P RIGHT Total Knee Arthroplasty  Systemic or

## 2019-06-10 ENCOUNTER — TELEPHONE (OUTPATIENT)
Dept: FAMILY MEDICINE CLINIC | Facility: CLINIC | Age: 59
End: 2019-06-10

## 2019-06-10 RX ORDER — AMLODIPINE BESYLATE 10 MG/1
10 TABLET ORAL DAILY
Qty: 90 TABLET | Refills: 0 | Status: SHIPPED | OUTPATIENT
Start: 2019-06-10 | End: 2019-09-05

## 2019-06-10 NOTE — DISCHARGE SUMMARY
[facility]  Discharge Summary    Lucio Loyola Patient Status:  Inpatient    3/23/1960 MRN [id number]   [facility] 3SW-A Attending No att. providers found   [facility] Day # 2 PCP Tanya Smith MD     Date of Admission: 2019 Dispositio medications from any pharmacy    Bring a paper prescription for each of these medications  · aspirin 325 MG Tbec  · HYDROcodone-acetaminophen  MG Tabs           Kermitt Needs  6/10/2019  8:25 AM

## 2019-06-10 NOTE — TELEPHONE ENCOUNTER
Pennie Castaneda at [facility] advised and verbalizes understanding. She will let the patient know.

## 2019-06-10 NOTE — TELEPHONE ENCOUNTER
Alex Rivera said BP was 170/90 then 160s/80s. She is on Lisinopril 40mg and that is it. She was not in pain when she was seen but she is having pain at times and her leg is very swollen. PT is supposed to come in but they cannot when her BP is that elevated.

## 2019-06-10 NOTE — TELEPHONE ENCOUNTER
Per Marleni Gray patient is being seen postoperatively for PT,OT, and RN services. Advise yes Dr Saroj Dimas will be following.  RAZ Hagan RN

## 2019-06-12 ENCOUNTER — PATIENT OUTREACH (OUTPATIENT)
Dept: CASE MANAGEMENT | Age: 59
End: 2019-06-12

## 2019-06-17 ENCOUNTER — APPOINTMENT (OUTPATIENT)
Dept: ULTRASOUND IMAGING | Facility: HOSPITAL | Age: 59
End: 2019-06-17
Attending: EMERGENCY MEDICINE
Payer: COMMERCIAL

## 2019-06-17 ENCOUNTER — HOSPITAL ENCOUNTER (EMERGENCY)
Facility: HOSPITAL | Age: 59
Discharge: HOME | End: 2019-06-17
Attending: EMERGENCY MEDICINE
Payer: COMMERCIAL

## 2019-06-17 VITALS
DIASTOLIC BLOOD PRESSURE: 59 MMHG | OXYGEN SATURATION: 98 % | HEART RATE: 79 BPM | TEMPERATURE: 99 F | RESPIRATION RATE: 16 BRPM | WEIGHT: 244 LBS | BODY MASS INDEX: 38.3 KG/M2 | SYSTOLIC BLOOD PRESSURE: 164 MMHG | HEIGHT: 67 IN

## 2019-06-17 DIAGNOSIS — R60.0 LEG EDEMA, RIGHT: Primary | ICD-10-CM

## 2019-06-17 PROCEDURE — 85025 COMPLETE CBC W/AUTO DIFF WBC: CPT | Performed by: EMERGENCY MEDICINE

## 2019-06-17 PROCEDURE — 99284 EMERGENCY DEPT VISIT MOD MDM: CPT

## 2019-06-17 PROCEDURE — 80048 BASIC METABOLIC PNL TOTAL CA: CPT | Performed by: EMERGENCY MEDICINE

## 2019-06-17 PROCEDURE — 36415 COLL VENOUS BLD VENIPUNCTURE: CPT

## 2019-06-17 PROCEDURE — 85730 THROMBOPLASTIN TIME PARTIAL: CPT | Performed by: EMERGENCY MEDICINE

## 2019-06-17 PROCEDURE — 93971 EXTREMITY STUDY: CPT | Performed by: EMERGENCY MEDICINE

## 2019-06-17 PROCEDURE — 85610 PROTHROMBIN TIME: CPT | Performed by: EMERGENCY MEDICINE

## 2019-06-17 RX ORDER — HYDROCODONE BITARTRATE AND ACETAMINOPHEN 5; 325 MG/1; MG/1
1 TABLET ORAL ONCE
Status: COMPLETED | OUTPATIENT
Start: 2019-06-17 | End: 2019-06-17

## 2019-06-18 ENCOUNTER — HOSPITAL ENCOUNTER (OUTPATIENT)
Dept: MAMMOGRAPHY | Age: 59
Discharge: HOME | End: 2019-06-18
Attending: ORTHOPAEDIC SURGERY
Payer: COMMERCIAL

## 2019-06-18 DIAGNOSIS — M25.561 RIGHT KNEE PAIN: ICD-10-CM

## 2019-06-18 PROCEDURE — 73560 X-RAY EXAM OF KNEE 1 OR 2: CPT | Performed by: ORTHOPAEDIC SURGERY

## 2019-06-18 NOTE — ED INITIAL ASSESSMENT (HPI)
R leg swelling, redness to R knee, s/p R knee replacement.  Noted swelling worse  Today, evaluated by home health RN today, referred by ortho surgeon to R/O dvt

## 2019-06-18 NOTE — ED PROVIDER NOTES
Patient Seen in: [facility] Emergency Department    History   Patient presents with:  Postop/Procedure Problem  Swelling Edema (cardiovascular, metabolic)    Stated Complaint: right leg swelling, 2 wk post knee replacement, sent in for US    HPI All other systems reviewed and negative except as noted above. Physical Exam     ED Triage Vitals [06/17/19 2154]   /79   Pulse 75   Resp 18   Temp 98.5 °F (36.9 °C)   Temp src Oral   SpO2 98 %   O2 Device None (Room air)       Current:BP Lolly Shabnam ) following orders were created for panel order CBC WITH DIFFERENTIAL WITH PLATELET.   Procedure                               Abnormality         Status                     ---------                               -----------         ------

## 2019-06-20 ENCOUNTER — APPOINTMENT (OUTPATIENT)
Dept: PHYSICAL THERAPY | Age: 59
End: 2019-06-20
Attending: INTERNAL MEDICINE
Payer: COMMERCIAL

## 2019-06-21 ENCOUNTER — APPOINTMENT (OUTPATIENT)
Dept: PHYSICAL THERAPY | Age: 59
End: 2019-06-21
Attending: INTERNAL MEDICINE
Payer: COMMERCIAL

## 2019-06-25 NOTE — PROGRESS NOTES
POST-OP KNEE EVALUATION:   Referring Physician: Dr. García Bedoya  Diagnosis: S/P R TKR 6/5/19     Date of Service: 6/26/2019     PATIENT SUMMARY   Courtney Khan is a 64year old female who presents to therapy today s/p R TKR on 6/5/19 with complaints of ex Knee   Flexion: R 4+/5; L 5/5  Extension: R 4+/5; L 5/5  Abduction: R 4/5; L 5/5  ER: R 4/5; L 5/5 Flexion: R 4-/5; L 5/5  Extension: R 3+/5; L 5/5        Balance: SLS: R *** sec, L *** sec    Functional Mobility:  5x sit<>stand: ***  TUG (AD, time): *** s Duration: Patient will be seen for 2 x/week or a total of 12 visits over a 90 day period.   Treatment will include: Gait training, Manual Therapy, Neuromuscular Re-education, Therapeutic Exercise and Home Exercise Program instruction    Education or treatme

## 2019-06-26 ENCOUNTER — APPOINTMENT (OUTPATIENT)
Dept: PHYSICAL THERAPY | Age: 59
End: 2019-06-26
Attending: INTERNAL MEDICINE
Payer: COMMERCIAL

## 2019-06-27 ENCOUNTER — APPOINTMENT (OUTPATIENT)
Dept: PHYSICAL THERAPY | Age: 59
End: 2019-06-27
Attending: INTERNAL MEDICINE
Payer: COMMERCIAL

## 2019-06-28 RX ORDER — OXYBUTYNIN CHLORIDE 10 MG/1
10 TABLET, EXTENDED RELEASE ORAL DAILY
Qty: 30 TABLET | Refills: 1 | Status: SHIPPED | OUTPATIENT
Start: 2019-06-28 | End: 2019-10-18

## 2019-06-28 NOTE — TELEPHONE ENCOUNTER
Last refill #30 x 3 on 3/6/19  Last office visit on 3/6/19  Future Appointments   Date Time Provider Manisha Osullivan   7/1/2019  6:30 PM SHIRLENE Riddle Preemption   7/3/2019  6:30 PM SHIRLENE Riddle Preemption   7/8/2019  6:30 PM Delia Wyatt, Cr

## 2019-07-01 ENCOUNTER — OFFICE VISIT (OUTPATIENT)
Dept: PHYSICAL THERAPY | Age: 59
End: 2019-07-01
Attending: INTERNAL MEDICINE
Payer: COMMERCIAL

## 2019-07-01 PROCEDURE — 97110 THERAPEUTIC EXERCISES: CPT

## 2019-07-01 PROCEDURE — 97140 MANUAL THERAPY 1/> REGIONS: CPT

## 2019-07-01 PROCEDURE — 97162 PT EVAL MOD COMPLEX 30 MIN: CPT

## 2019-07-01 NOTE — PROGRESS NOTES
POST-OP KNEE EVALUATION:   Referring Physician: Dr. Richard Dunbar  Diagnosis: S/P R TKR 6/5/19     Date of Service: 7/1/2019     PATIENT SUMMARY   Janet Hanks is a 64year old female who presents to therapy today s/p R TKR on 6/5/19 with complaints of exp tightness  Gastroc-soleus: R Moderate tightness; L Mild tightness  Quads: R Moderate tightness; L Mild tightness    Strength/MMT: (* denotes performed with pain)  Hip Knee   Flexion: R 4+/5; L 5/5  Extension: R 4+/5; L 5/5  Abduction: R 4/5; L 5/5  ER: R 4 grass  · Pt will be independent and compliant with comprehensive HEP to maintain progress achieved in PT    Frequency / Duration: Patient will be seen for 2 x/week or a total of 12 visits over a 90 day period.   Treatment will include: Gait training, Manual

## 2019-07-03 ENCOUNTER — OFFICE VISIT (OUTPATIENT)
Dept: PHYSICAL THERAPY | Age: 59
End: 2019-07-03
Attending: INTERNAL MEDICINE
Payer: COMMERCIAL

## 2019-07-03 PROCEDURE — 97140 MANUAL THERAPY 1/> REGIONS: CPT

## 2019-07-03 PROCEDURE — 97110 THERAPEUTIC EXERCISES: CPT

## 2019-07-03 NOTE — PROGRESS NOTES
Dx:  JEANINE TKR  6/5/19        [facility] (Authorized # of Visits[facility]     Authorizing Physician: Dr. Matt Mas  Next MD visit: none scheduled  Fall Risk: standard         Precautions: n/a             Subjective: Sore the day after evaluation, but bet L7  Hamstring stretch 3x30'  gastroc stretch 3x30\"  Slant board L2 3x30\"  PROM knee 10'  SAQ 2# 3x10  Shuttle 37 3x10  Step overs 4\" 3x10  Lateral step ups 4\" 3x10       MANUAL THERAPY  STM scar/quad/hams 9'  Patella mobs grade 3 all directions 4'

## 2019-07-08 ENCOUNTER — APPOINTMENT (OUTPATIENT)
Dept: PHYSICAL THERAPY | Age: 59
End: 2019-07-08
Attending: INTERNAL MEDICINE
Payer: COMMERCIAL

## 2019-07-09 ENCOUNTER — OFFICE VISIT (OUTPATIENT)
Dept: PHYSICAL THERAPY | Age: 59
End: 2019-07-09
Attending: INTERNAL MEDICINE
Payer: COMMERCIAL

## 2019-07-09 PROCEDURE — 97110 THERAPEUTIC EXERCISES: CPT

## 2019-07-09 PROCEDURE — 97140 MANUAL THERAPY 1/> REGIONS: CPT

## 2019-07-09 NOTE — PROGRESS NOTES
Dx:  SP TKR  6/5/19        [facility] Visits[facility]     Authorizing Physician: Dr. Jarrett Enriquez  Next MD visit: none scheduled  Fall Risk: standard         Precautions: n/a             Subjective: Knee not as sore after 2nd session as 3x10  Lateral step ups 4\" 3x10 THERAPEUTIC EX  Nu step 8' L7  Hamstring stretch 3x30'  gastroc stretch 3x30\"  Slant board L2 3x30\"  PROM knee 10'  SAQ 2# 3x10  Shuttle 37 3x10  Step overs 4\" 3x10  Lateral step ups 4\" 3x10      MANUAL THERAPY  STM scar

## 2019-07-12 ENCOUNTER — OFFICE VISIT (OUTPATIENT)
Dept: PHYSICAL THERAPY | Age: 59
End: 2019-07-12
Attending: INTERNAL MEDICINE
Payer: COMMERCIAL

## 2019-07-12 PROCEDURE — 97110 THERAPEUTIC EXERCISES: CPT

## 2019-07-12 PROCEDURE — 97140 MANUAL THERAPY 1/> REGIONS: CPT

## 2019-07-12 NOTE — PROGRESS NOTES
Dx: SP TKR  6/5/19        Insurance (Authorized # of Visits):  [facility]     Authorizing Physician: Dr. Lesvia Up  Next MD visit: none scheduled  Fall Risk: standard         Precautions: n/a             Subjective: Knee feels so much better.  Know ROM still stretch 3x30\"  Slant board L2 3x30\"  PROM knee 10'  SAQ 2# 3x10  Shuttle 37 3x10  Step overs 4\" 3x10  Lateral step ups 4\" 3x10 THERAPEUTIC EX  Nu step 8' L7  Hamstring stretch 3x30'  gastroc stretch 3x30\"  Slant board L2 3x30\"  PROM knee 10'  SAQ 2#

## 2019-07-15 ENCOUNTER — OFFICE VISIT (OUTPATIENT)
Dept: PHYSICAL THERAPY | Age: 59
End: 2019-07-15
Attending: INTERNAL MEDICINE
Payer: COMMERCIAL

## 2019-07-15 PROCEDURE — 97140 MANUAL THERAPY 1/> REGIONS: CPT

## 2019-07-15 PROCEDURE — 97110 THERAPEUTIC EXERCISES: CPT

## 2019-07-15 NOTE — PROGRESS NOTES
Dx: JEANINE TKR  6/5/19        Insurance (Authorized # of Visits):  [facility]     Authorizing Physician: Dr. Lana Varela MD visit: none scheduled  Fall Risk: standard         Precautions: n/a             Subjective: Ran out of pain meds, seems okay so far. 3x10  Shuttle 37 3x10  Step overs 4\" 3x10  Lateral step ups 4\" 3x10 THERAPEUTIC EX  Nu step 8' L7  Hamstring stretch 3x30'  gastroc stretch 3x30\"  Slant board L2 3x30\"  PROM knee 10'  SAQ 2# 3x10  Shuttle 37 3x10  Step overs 4\" 3x10  Lateral step ups

## 2019-07-18 ENCOUNTER — OFFICE VISIT (OUTPATIENT)
Dept: PHYSICAL THERAPY | Age: 59
End: 2019-07-18
Attending: INTERNAL MEDICINE
Payer: COMMERCIAL

## 2019-07-18 PROCEDURE — 97140 MANUAL THERAPY 1/> REGIONS: CPT

## 2019-07-18 PROCEDURE — 97110 THERAPEUTIC EXERCISES: CPT

## 2019-07-18 NOTE — PROGRESS NOTES
Dx: JEANINE TKR  6/5/19        Insurance (Authorized # of Visits):  [facility]     Authorizing Physician: Dr. Telma Da Silva  Next MD visit: none scheduled  Fall Risk: standard         Precautions: n/a             Subjective: Saw MD, happy with progress.  Sore after b board L2 3x30\"  PROM knee 10'  SAQ 2# 3x10  Shuttle 37 3x10  Step overs 4\" 3x10  Lateral step ups 4\" 3x10 THERAPEUTIC EX  Nu step 8' L7  Hamstring stretch 3x30'  gastroc stretch 3x30\"  Slant board L2 3x30\"  PROM knee 10'  SAQ 2# 3x10  Shuttle 37 3x10

## 2019-07-22 ENCOUNTER — APPOINTMENT (OUTPATIENT)
Dept: PHYSICAL THERAPY | Age: 59
End: 2019-07-22
Attending: INTERNAL MEDICINE
Payer: COMMERCIAL

## 2019-07-25 ENCOUNTER — OFFICE VISIT (OUTPATIENT)
Dept: PHYSICAL THERAPY | Age: 59
End: 2019-07-25
Attending: INTERNAL MEDICINE
Payer: COMMERCIAL

## 2019-07-25 PROCEDURE — 97140 MANUAL THERAPY 1/> REGIONS: CPT

## 2019-07-25 PROCEDURE — 97110 THERAPEUTIC EXERCISES: CPT

## 2019-07-25 NOTE — PROGRESS NOTES
Dx:  JEANINE TKR  6/5/19        Insurance (Authorized # of Visits):  Med [facility]     Authorizing Physician: Dr. Lcuas Desai  Next MD visit: none scheduled  Fall Risk: standard         Precautions: n/a             Subjective: Stiff and sore from missing last session stretch 3x30'  gastroc stretch 3x30\"  Slant board L2 3x30\"  PROM knee 10'  SAQ 2# 3x10  Shuttle 37 3x10  Step overs 4\" 3x10  Lateral step ups 4\" 3x10 THERAPEUTIC EX  Nu step 8' L7  Hamstring stretch 3x30'  gastroc stretch 3x30\"  Slant board L2 3x30\"

## 2019-07-26 ENCOUNTER — OFFICE VISIT (OUTPATIENT)
Dept: PHYSICAL THERAPY | Age: 59
End: 2019-07-26
Attending: INTERNAL MEDICINE
Payer: COMMERCIAL

## 2019-07-26 PROCEDURE — 97140 MANUAL THERAPY 1/> REGIONS: CPT

## 2019-07-26 PROCEDURE — 97110 THERAPEUTIC EXERCISES: CPT

## 2019-07-26 NOTE — PROGRESS NOTES
Dx:  JEANINE TKR  6/5/19        Insurance (Authorized # of Visits):  Med [facility]     Authorizing Physician: Dr. Lucas Desai  Next MD visit: none scheduled  Fall Risk: standard         Precautions: n/a             Subjective: ROM feels like it is getting better, stif step 8' L7  Hamstring stretch 3x30'  gastroc stretch 3x30\"  Slant board L2 3x30\"  PROM knee 10'  SAQ 2# 3x10  Shuttle 37 3x10  Step overs 4\" 3x10  Lateral step ups 4\" 3x10 THERAPEUTIC EX  Nu step 8' L7  Hamstring stretch 3x30'  gastroc stretch 3x30\" therapeutic ex 2       Total Timed Treatment: 45 min  Total Treatment Time: 53 min

## 2019-07-30 ENCOUNTER — OFFICE VISIT (OUTPATIENT)
Dept: PHYSICAL THERAPY | Age: 59
End: 2019-07-30
Attending: INTERNAL MEDICINE
Payer: COMMERCIAL

## 2019-07-30 PROCEDURE — 97140 MANUAL THERAPY 1/> REGIONS: CPT

## 2019-07-30 PROCEDURE — 97110 THERAPEUTIC EXERCISES: CPT

## 2019-07-30 NOTE — PROGRESS NOTES
Dx:  SP TKR  6/5/19        [facility] Visits[facility]     Authorizing Physician: Dr. Lashay Carbajal  Next MD visit: none scheduled  Fall Risk: standard         Precautions: n/a             Subjective: Pt states she is relatively pain free but 7/30/19  TX#: 9/12   THERAPEUTIC EX  Nu step 8' L7  Hamstring stretch 3x30'  gastroc stretch 3x30\"  Slant board L2 3x30\"  PROM knee 10'  SAQ 2# 3x10  Shuttle 37 3x10  Step overs 4\" 3x10  Lateral step ups 4\" 3x10 THERAPEUTIC EX  Nu step 8' L7  Hamstring directions 4' MANUAL THERAPY  STM scar/quad/hams 9'  Patella mobs grade 3 all directions 4' MANUAL THERAPY  STM scar/quad/hams 9'  Patella mobs grade 3 all directions 4'     NEURO RE-ED  SLS 3x30\" NEURO RE-ED  SLS 3x30\" NEURO RE-ED  SLS 3x30\" NEURO RE-E

## 2019-08-06 ENCOUNTER — OFFICE VISIT (OUTPATIENT)
Dept: PHYSICAL THERAPY | Age: 59
End: 2019-08-06
Attending: INTERNAL MEDICINE
Payer: COMMERCIAL

## 2019-08-06 PROCEDURE — 97110 THERAPEUTIC EXERCISES: CPT

## 2019-08-06 PROCEDURE — 97140 MANUAL THERAPY 1/> REGIONS: CPT

## 2019-08-06 NOTE — PROGRESS NOTES
Dx: SP TKR  6/5/19        Insurance (Authorized # of Visits):  [facility]     Authorizing Physician: Dr. Finesse Valenzuela  Next MD visit: none scheduled  Fall Risk: standard         Precautions: n/a             Subjective: On medical leave until 9/7/19.  Knee daniella 3x10  Shuttle 50 3x10  Step overs 4\" 3x10  Lateral step ups 4\" 3x10 THERAPEUTIC EX  Nu step 8' L7  Hamstring stretch 3x30'  gastroc stretch 3x30\"  Hip flexor stretch 3x30\"  Slant board L2 3x30\"  PROM knee 10'  SAQ 4# 3x10  Shuttle 50 3x10  Step overs pad           HEP: Step up, lateral step up, hip flexor stretch    Charges: man therapy, therapeutic ex 2       Total Timed Treatment: 50 min  Total Treatment Time: 50 min

## 2019-08-09 ENCOUNTER — OFFICE VISIT (OUTPATIENT)
Dept: PHYSICAL THERAPY | Age: 59
End: 2019-08-09
Attending: INTERNAL MEDICINE
Payer: COMMERCIAL

## 2019-08-09 PROCEDURE — 97110 THERAPEUTIC EXERCISES: CPT

## 2019-08-09 PROCEDURE — 97140 MANUAL THERAPY 1/> REGIONS: CPT

## 2019-08-09 NOTE — PROGRESS NOTES
Dx:  JEANINE TKR  6/5/19        Insurance (Authorized # of Visits):  Med [facility]     Authorizing Physician: Dr. Juan Santiago  Next MD visit: none scheduled  Fall Risk: standard         Precautions: n/a             Subjective: Continue to see gradual gains in strength knee 10'  SAQ 4# 3x10  Shuttle 50 3x10  Step overs 4\" 3x10  Lateral step ups 4\" 3x10 THERAPEUTIC EX  Nu step 8' L7  Hamstring stretch 3x30'  gastroc stretch 3x30\"  Hip flexor stretch 3x30\"  Slant board L2 3x30\"  PROM knee 10'  SAQ 4# 3x10  Shuttle 50 pad NEURO RE-ED  [facility] 3x30\" foam pad           HEP: Step up, lateral step up, hip flexor stretch    Charges: man therapy, therapeutic ex 2       Total Timed Treatment: 50 min  Total Treatment Time: 50 min

## 2019-08-16 ENCOUNTER — OFFICE VISIT (OUTPATIENT)
Dept: PHYSICAL THERAPY | Age: 59
End: 2019-08-16
Attending: INTERNAL MEDICINE
Payer: COMMERCIAL

## 2019-08-16 PROCEDURE — 97140 MANUAL THERAPY 1/> REGIONS: CPT

## 2019-08-16 PROCEDURE — 97110 THERAPEUTIC EXERCISES: CPT

## 2019-08-16 NOTE — PROGRESS NOTES
Dx: SP TKR  6/5/19        Insurance (Authorized # of Visits):  [facility]     Authorizing Physician: Dr. Hodan Ruggiero  Next MD visit: none scheduled  Fall Risk: standard         Precautions: n/a             Subjective: Going back to work next week.  MD happy wi stretch 3x30\"  Hip flexor stretch 3x30\"  Slant board L2 3x30\"  PROM knee 10'  SAQ 5# 3x10  Shuttle 56 3x10  Step overs 4\" 3x10  Lateral step ups 6\" 3x10  Lateral walking red 25'x4 THERE EX:  NU step 8 mins  Hamstring stretch 3x30'  gastroc stretch 3x3 therapy, therapeutic ex 2       Total Timed Treatment: 45 min  Total Treatment Time: 50 min

## 2019-08-20 ENCOUNTER — APPOINTMENT (OUTPATIENT)
Dept: PHYSICAL THERAPY | Age: 59
End: 2019-08-20
Attending: INTERNAL MEDICINE
Payer: COMMERCIAL

## 2019-08-23 ENCOUNTER — APPOINTMENT (OUTPATIENT)
Dept: PHYSICAL THERAPY | Age: 59
End: 2019-08-23
Attending: INTERNAL MEDICINE
Payer: COMMERCIAL

## 2019-09-05 RX ORDER — AMLODIPINE BESYLATE 10 MG/1
10 TABLET ORAL DAILY
Qty: 90 TABLET | Refills: 0 | Status: SHIPPED | OUTPATIENT
Start: 2019-09-05 | End: 2019-11-09

## 2019-09-09 ENCOUNTER — OFFICE VISIT (OUTPATIENT)
Dept: FAMILY MEDICINE CLINIC | Facility: CLINIC | Age: 59
End: 2019-09-09
Payer: COMMERCIAL

## 2019-09-09 VITALS
DIASTOLIC BLOOD PRESSURE: 80 MMHG | WEIGHT: 234.38 LBS | BODY MASS INDEX: 37 KG/M2 | TEMPERATURE: 98 F | HEART RATE: 78 BPM | SYSTOLIC BLOOD PRESSURE: 124 MMHG

## 2019-09-09 DIAGNOSIS — G47.33 OSA (OBSTRUCTIVE SLEEP APNEA): Primary | ICD-10-CM

## 2019-09-09 DIAGNOSIS — E78.00 HYPERCHOLESTEREMIA: ICD-10-CM

## 2019-09-09 DIAGNOSIS — I10 ESSENTIAL HYPERTENSION: ICD-10-CM

## 2019-09-09 PROCEDURE — 99214 OFFICE O/P EST MOD 30 MIN: CPT | Performed by: INTERNAL MEDICINE

## 2019-09-09 RX ORDER — ATORVASTATIN CALCIUM 40 MG/1
40 TABLET, FILM COATED ORAL NIGHTLY
Qty: 90 TABLET | Refills: 3 | Status: SHIPPED
Start: 2019-09-09 | End: 2021-01-20

## 2019-09-09 NOTE — PROGRESS NOTES
is a 64year old female. HPI:   Pt had a right knee surgery and feeling great. She is working nights again. She has no pain and off pain medications. She has lost weight. She needs cholesterol meds. Hypoxia in the hospital after surgery.  She snores al rashes  RESPIRATORY: denies shortness of breath with exertion  CARDIOVASCULAR: denies chest pain on exertion  GI: denies abdominal pain and denies heartburn  NEURO: denies headaches    EXAM:   /80 (BP Location: Left arm, Patient Position: Sitting, Cu

## 2019-09-10 ENCOUNTER — LABORATORY ENCOUNTER (OUTPATIENT)
Dept: LAB | Age: 59
End: 2019-09-10
Attending: INTERNAL MEDICINE
Payer: COMMERCIAL

## 2019-09-10 DIAGNOSIS — E78.00 ELEVATED CHOLESTEROL: Primary | ICD-10-CM

## 2019-09-10 DIAGNOSIS — G47.33 OSA (OBSTRUCTIVE SLEEP APNEA): ICD-10-CM

## 2019-09-10 LAB
ALBUMIN SERPL-MCNC: 3.8 G/DL (ref 3.4–5)
ALBUMIN/GLOB SERPL: 1.1 {RATIO} (ref 1–2)
ALP LIVER SERPL-CCNC: 94 U/L (ref 46–118)
ALT SERPL-CCNC: 38 U/L (ref 13–56)
ANION GAP SERPL CALC-SCNC: 7 MMOL/L (ref 0–18)
AST SERPL-CCNC: 24 U/L (ref 15–37)
BASOPHILS # BLD AUTO: 0.06 X10(3) UL (ref 0–0.2)
BASOPHILS NFR BLD AUTO: 0.8 %
BILIRUB SERPL-MCNC: 0.6 MG/DL (ref 0.1–2)
BUN BLD-MCNC: 14 MG/DL (ref 7–18)
BUN/CREAT SERPL: 18.2 (ref 10–20)
CALCIUM BLD-MCNC: 9.1 MG/DL (ref 8.5–10.1)
CHLORIDE SERPL-SCNC: 104 MMOL/L (ref 98–112)
CHOLEST SMN-MCNC: 269 MG/DL (ref ?–200)
CO2 SERPL-SCNC: 28 MMOL/L (ref 21–32)
CREAT BLD-MCNC: 0.77 MG/DL (ref 0.55–1.02)
DEPRECATED RDW RBC AUTO: 42 FL (ref 35.1–46.3)
EOSINOPHIL # BLD AUTO: 0.08 X10(3) UL (ref 0–0.7)
EOSINOPHIL NFR BLD AUTO: 1.1 %
ERYTHROCYTE [DISTWIDTH] IN BLOOD BY AUTOMATED COUNT: 12.6 % (ref 11–15)
GLOBULIN PLAS-MCNC: 3.6 G/DL (ref 2.8–4.4)
GLUCOSE BLD-MCNC: 92 MG/DL (ref 70–99)
HCT VFR BLD AUTO: 45.2 % (ref 35–48)
HDLC SERPL-MCNC: 53 MG/DL (ref 40–59)
HGB BLD-MCNC: 14.6 G/DL (ref 12–16)
IMM GRANULOCYTES # BLD AUTO: 0.02 X10(3) UL (ref 0–1)
IMM GRANULOCYTES NFR BLD: 0.3 %
LDLC SERPL CALC-MCNC: 181 MG/DL (ref ?–100)
LYMPHOCYTES # BLD AUTO: 2.63 X10(3) UL (ref 1–4)
LYMPHOCYTES NFR BLD AUTO: 35.2 %
M PROTEIN MFR SERPL ELPH: 7.4 G/DL (ref 6.4–8.2)
MCH RBC QN AUTO: 29.7 PG (ref 26–34)
MCHC RBC AUTO-ENTMCNC: 32.3 G/DL (ref 31–37)
MCV RBC AUTO: 91.9 FL (ref 80–100)
MONOCYTES # BLD AUTO: 0.54 X10(3) UL (ref 0.1–1)
MONOCYTES NFR BLD AUTO: 7.2 %
NEUTROPHILS # BLD AUTO: 4.14 X10 (3) UL (ref 1.5–7.7)
NEUTROPHILS # BLD AUTO: 4.14 X10(3) UL (ref 1.5–7.7)
NEUTROPHILS NFR BLD AUTO: 55.4 %
NONHDLC SERPL-MCNC: 216 MG/DL (ref ?–130)
OSMOLALITY SERPL CALC.SUM OF ELEC: 288 MOSM/KG (ref 275–295)
PLATELET # BLD AUTO: 388 10(3)UL (ref 150–450)
POTASSIUM SERPL-SCNC: 5 MMOL/L (ref 3.5–5.1)
RBC # BLD AUTO: 4.92 X10(6)UL (ref 3.8–5.3)
SODIUM SERPL-SCNC: 139 MMOL/L (ref 136–145)
TRIGL SERPL-MCNC: 173 MG/DL (ref 30–149)
VLDLC SERPL CALC-MCNC: 35 MG/DL (ref 0–30)
WBC # BLD AUTO: 7.5 X10(3) UL (ref 4–11)

## 2019-09-10 PROCEDURE — 85025 COMPLETE CBC W/AUTO DIFF WBC: CPT | Performed by: INTERNAL MEDICINE

## 2019-09-10 PROCEDURE — 36415 COLL VENOUS BLD VENIPUNCTURE: CPT | Performed by: INTERNAL MEDICINE

## 2019-09-10 PROCEDURE — 80053 COMPREHEN METABOLIC PANEL: CPT | Performed by: INTERNAL MEDICINE

## 2019-09-10 PROCEDURE — 80061 LIPID PANEL: CPT | Performed by: INTERNAL MEDICINE

## 2019-09-10 RX ORDER — AMLODIPINE BESYLATE 10 MG/1
10 TABLET ORAL DAILY
Qty: 90 TABLET | Refills: 0 | OUTPATIENT
Start: 2019-09-10 | End: 2019-12-09

## 2019-10-08 ENCOUNTER — OFFICE VISIT (OUTPATIENT)
Dept: FAMILY MEDICINE CLINIC | Facility: CLINIC | Age: 59
End: 2019-10-08
Payer: COMMERCIAL

## 2019-10-08 ENCOUNTER — TELEPHONE (OUTPATIENT)
Dept: FAMILY MEDICINE CLINIC | Facility: CLINIC | Age: 59
End: 2019-10-08

## 2019-10-08 VITALS
DIASTOLIC BLOOD PRESSURE: 90 MMHG | BODY MASS INDEX: 35 KG/M2 | WEIGHT: 225 LBS | OXYGEN SATURATION: 97 % | HEART RATE: 75 BPM | TEMPERATURE: 98 F | SYSTOLIC BLOOD PRESSURE: 120 MMHG

## 2019-10-08 DIAGNOSIS — R13.19 ESOPHAGEAL DYSPHAGIA: Primary | ICD-10-CM

## 2019-10-08 PROCEDURE — 99214 OFFICE O/P EST MOD 30 MIN: CPT | Performed by: INTERNAL MEDICINE

## 2019-10-08 NOTE — PROGRESS NOTES
Ofe Bueno is a 64year old female. HPI:   Pt has food sticking in her throat and now even throwing up liquids now. Dropping weight. Current Outpatient Medications:  atorvastatin 40 MG Oral Tab Take 1 tablet (40 mg total) by mouth nightly.  Disp: adenopathy,no bruits  LUNGS: clear to auscultation  CARDIO: RRR without murmur  GI: good BS's,no masses, HSM or tenderness  EXTREMITIES: no cyanosis, clubbing or edema    ASSESSMENT AND PLAN:     Esophageal dysphagia  (primary encounter diagnosis)  Needs G

## 2019-10-17 PROBLEM — Z12.11 SPECIAL SCREENING FOR MALIGNANT NEOPLASMS, COLON: Status: ACTIVE | Noted: 2019-10-17

## 2019-10-17 PROBLEM — R13.10 DYSPHAGIA: Status: ACTIVE | Noted: 2019-10-17

## 2019-10-17 PROBLEM — K63.5 POLYP, SIGMOID COLON: Status: ACTIVE | Noted: 2019-10-17

## 2019-10-20 RX ORDER — OXYBUTYNIN CHLORIDE 10 MG/1
TABLET, EXTENDED RELEASE ORAL
Qty: 90 TABLET | Refills: 3 | Status: SHIPPED | OUTPATIENT
Start: 2019-10-20 | End: 2020-11-12

## 2019-10-21 RX ORDER — ESCITALOPRAM OXALATE 20 MG/1
20 TABLET ORAL DAILY
Qty: 90 TABLET | Refills: 1 | Status: SHIPPED | OUTPATIENT
Start: 2019-10-21 | End: 2020-02-20

## 2019-10-21 NOTE — TELEPHONE ENCOUNTER
Last refill #90 x 1 on 2/28/19  Last office visit on 10/14/19  Future Appointments   Date Time Provider Manisha Osullivan   12/17/2019  1:30 PM Bear Jacobo [place] [address] [address]

## 2019-11-09 ENCOUNTER — TELEPHONE (OUTPATIENT)
Dept: FAMILY MEDICINE CLINIC | Facility: CLINIC | Age: 59
End: 2019-11-09

## 2019-11-09 ENCOUNTER — OFFICE VISIT (OUTPATIENT)
Dept: FAMILY MEDICINE CLINIC | Facility: CLINIC | Age: 59
End: 2019-11-09
Payer: COMMERCIAL

## 2019-11-09 VITALS
HEART RATE: 76 BPM | DIASTOLIC BLOOD PRESSURE: 72 MMHG | SYSTOLIC BLOOD PRESSURE: 130 MMHG | TEMPERATURE: 99 F | WEIGHT: 232.13 LBS | RESPIRATION RATE: 16 BRPM | BODY MASS INDEX: 37 KG/M2

## 2019-11-09 DIAGNOSIS — L03.116 CELLULITIS OF LEFT LOWER EXTREMITY: Primary | ICD-10-CM

## 2019-11-09 DIAGNOSIS — R60.9 EDEMA, UNSPECIFIED TYPE: ICD-10-CM

## 2019-11-09 PROCEDURE — 99214 OFFICE O/P EST MOD 30 MIN: CPT | Performed by: INTERNAL MEDICINE

## 2019-11-09 RX ORDER — CEPHALEXIN 500 MG/1
500 CAPSULE ORAL 3 TIMES DAILY
Qty: 30 CAPSULE | Refills: 0 | Status: SHIPPED | OUTPATIENT
Start: 2019-11-09 | End: 2019-11-19

## 2019-11-09 RX ORDER — FUROSEMIDE 40 MG/1
40 TABLET ORAL DAILY
Qty: 90 TABLET | Refills: 0 | Status: SHIPPED | OUTPATIENT
Start: 2019-11-09 | End: 2020-03-02

## 2019-11-09 RX ORDER — AMLODIPINE BESYLATE 10 MG/1
10 TABLET ORAL DAILY
Qty: 90 TABLET | Refills: 0 | Status: SHIPPED | OUTPATIENT
Start: 2019-11-09 | End: 2019-11-12

## 2019-11-09 NOTE — PROGRESS NOTES
Ofe Bueno is a 64year old female. HPI:   Swollen legs bilaterally. Red rash in the left lower leg. Some weight gain, no fever or chills. Taking amlodipine and oxybutynin. Gets worse, ie more red and swollen, the longer she is on her feet.    Faustine Salines fullness after meals Sept. 2019   • Unspecified essential hypertension    • Visual impairment     glasses   • Vomiting 2017    Rescently has gotten worae   • Wears glasses 2009   • Weight loss Recent      Social History:  Social History    Tobacco Use days.       Imaging & Consults:  None    Follow up as needed. The patient indicates understanding of these issues and agrees to the plan. The patient is asked to return in 2 weeks.

## 2019-11-09 NOTE — TELEPHONE ENCOUNTER
Patient said that she has had a rash on her lower legs for a couple days that is painful. She said that there is also swelling that goes down when elevated, the rash also improves when she elevates her legs as well.  She said that it red with little red dot

## 2019-11-09 NOTE — TELEPHONE ENCOUNTER
HAS A RASH, HAS APPT SCHEDULED Monday   ASKING TO SEND A PICTURE OF IT TO SEE IF ANYTHING CAN BE CALLED IN FOR HER.    PLEASE CALL PATIENT

## 2019-11-12 ENCOUNTER — OFFICE VISIT (OUTPATIENT)
Dept: FAMILY MEDICINE CLINIC | Facility: CLINIC | Age: 59
End: 2019-11-12
Payer: COMMERCIAL

## 2019-11-12 ENCOUNTER — APPOINTMENT (OUTPATIENT)
Dept: LAB | Age: 59
End: 2019-11-12
Attending: INTERNAL MEDICINE
Payer: COMMERCIAL

## 2019-11-12 VITALS
DIASTOLIC BLOOD PRESSURE: 78 MMHG | TEMPERATURE: 99 F | HEART RATE: 80 BPM | RESPIRATION RATE: 12 BRPM | BODY MASS INDEX: 36 KG/M2 | WEIGHT: 225 LBS | SYSTOLIC BLOOD PRESSURE: 150 MMHG

## 2019-11-12 DIAGNOSIS — I10 ESSENTIAL HYPERTENSION: ICD-10-CM

## 2019-11-12 DIAGNOSIS — I10 ESSENTIAL HYPERTENSION: Primary | ICD-10-CM

## 2019-11-12 PROCEDURE — 80053 COMPREHEN METABOLIC PANEL: CPT | Performed by: INTERNAL MEDICINE

## 2019-11-12 PROCEDURE — 36415 COLL VENOUS BLD VENIPUNCTURE: CPT | Performed by: INTERNAL MEDICINE

## 2019-11-12 PROCEDURE — 99214 OFFICE O/P EST MOD 30 MIN: CPT | Performed by: INTERNAL MEDICINE

## 2019-11-12 RX ORDER — METOPROLOL SUCCINATE 50 MG/1
50 TABLET, EXTENDED RELEASE ORAL DAILY
Qty: 90 TABLET | Refills: 3 | Status: SHIPPED | OUTPATIENT
Start: 2019-11-12 | End: 2020-11-13

## 2019-11-12 NOTE — PROGRESS NOTES
Ovi Kellogg is a 64year old female. HPI:   Pt has been having swelling in the feet. She has been on Lasix and lost weight. Legs are better, but still swell when she is on her feet all day.   Her blood pressure is borderline high and she is on Amlodip 2019   • Uncomfortable fullness after meals Sept. 2019   • Unspecified essential hypertension    • Visual impairment     glasses   • Vomiting 2017    Rescently has gotten worae   • Wears glasses 2009   • Weight loss Recent      Social History:  Social Hist

## 2019-11-19 ENCOUNTER — MED REC SCAN ONLY (OUTPATIENT)
Dept: FAMILY MEDICINE CLINIC | Facility: CLINIC | Age: 59
End: 2019-11-19

## 2019-11-20 ENCOUNTER — TELEPHONE (OUTPATIENT)
Dept: FAMILY MEDICINE CLINIC | Facility: CLINIC | Age: 59
End: 2019-11-20

## 2019-11-20 NOTE — TELEPHONE ENCOUNTER
Patient advised paperwork filled out for health qualification form to [place], [place] to [phone] and copy up front for patient per her request.

## 2019-11-30 RX ORDER — LISINOPRIL 40 MG/1
TABLET ORAL
Qty: 90 TABLET | Refills: 0 | Status: SHIPPED | OUTPATIENT
Start: 2019-11-30 | End: 2020-06-15

## 2019-12-02 RX ORDER — LISINOPRIL 40 MG/1
TABLET ORAL
Qty: 90 TABLET | Refills: 0 | OUTPATIENT
Start: 2019-12-02

## 2019-12-17 PROBLEM — K44.9 DIAPHRAGMATIC HERNIA WITHOUT OBSTRUCTION OR GANGRENE: Status: ACTIVE | Noted: 2019-12-17

## 2019-12-17 PROBLEM — K21.00 GERD WITH ESOPHAGITIS: Status: ACTIVE | Noted: 2019-12-17

## 2019-12-17 PROBLEM — K22.70 BARRETT'S ESOPHAGUS WITHOUT DYSPLASIA: Status: ACTIVE | Noted: 2019-12-17

## 2020-02-20 RX ORDER — ESCITALOPRAM OXALATE 20 MG/1
20 TABLET ORAL DAILY
Qty: 90 TABLET | Refills: 1 | OUTPATIENT
Start: 2020-02-20

## 2020-02-20 RX ORDER — ESCITALOPRAM OXALATE 20 MG/1
20 TABLET ORAL DAILY
Qty: 90 TABLET | Refills: 1 | Status: SHIPPED | OUTPATIENT
Start: 2020-02-20 | End: 2020-06-15

## 2020-02-20 NOTE — TELEPHONE ENCOUNTER
Last refill #90 x 1 on 10/21/19  Patient has requested a refill too soon. Refill remains on script. Denied.

## 2020-02-20 NOTE — TELEPHONE ENCOUNTER
LOV  11/12/19    LAST LAB  9/10/19    LAST RX  10/21/19  (90 tabs, 0 refill)    Next OV  No future appointments.     PROTOCOL  none

## 2020-03-02 RX ORDER — FUROSEMIDE 40 MG/1
40 TABLET ORAL DAILY
Qty: 90 TABLET | Refills: 0 | Status: SHIPPED | OUTPATIENT
Start: 2020-03-02 | End: 2020-07-14

## 2020-03-02 NOTE — TELEPHONE ENCOUNTER
Last refill #90 on 11/9/19  Last office visit on 11/12/19  No future appointments.   Last cmp on 11/12/19

## 2020-03-27 ENCOUNTER — TELEPHONE (OUTPATIENT)
Dept: FAMILY MEDICINE CLINIC | Facility: CLINIC | Age: 60
End: 2020-03-27

## 2020-03-27 ENCOUNTER — OFFICE VISIT (OUTPATIENT)
Dept: FAMILY MEDICINE CLINIC | Facility: CLINIC | Age: 60
End: 2020-03-27
Payer: COMMERCIAL

## 2020-03-27 VITALS
HEART RATE: 63 BPM | OXYGEN SATURATION: 95 % | WEIGHT: 238 LBS | TEMPERATURE: 98 F | SYSTOLIC BLOOD PRESSURE: 132 MMHG | HEIGHT: 66 IN | BODY MASS INDEX: 38.25 KG/M2 | DIASTOLIC BLOOD PRESSURE: 78 MMHG | RESPIRATION RATE: 16 BRPM

## 2020-03-27 DIAGNOSIS — M17.32 POST-TRAUMATIC OSTEOARTHRITIS OF LEFT KNEE: Primary | ICD-10-CM

## 2020-03-27 PROCEDURE — 99213 OFFICE O/P EST LOW 20 MIN: CPT | Performed by: FAMILY MEDICINE

## 2020-03-27 RX ORDER — DICLOFENAC SODIUM 75 MG/1
75 TABLET, DELAYED RELEASE ORAL 2 TIMES DAILY
Qty: 60 TABLET | Refills: 0 | Status: SHIPPED | OUTPATIENT
Start: 2020-03-27 | End: 2020-06-15

## 2020-03-27 NOTE — TELEPHONE ENCOUNTER
Patient states that pain is localized in the front of her leg about 1/3 down from her knee. Her legs do swell, which she is on a diuretic for. Patient would like to be seen if possible. No fever or s/s of respiratory illness.

## 2020-03-27 NOTE — PATIENT INSTRUCTIONS
Shin Splints (Medial Tibial Stress Syndrome)  Pain felt in the front of your lower leg is often called “shin splints.” One common cause of this pain is tendinitis. This is the inflammation of tendons.  These are the tough, cordlike bands of tissue that co · Be careful not to overtrain. · Don't run on hard or uneven surfaces. · If you have flat feet or low arches, consider orthotics or insoles for correction. Use running shoes with good support and cushioned soles. Replace old or worn shoes.   Date Last Re

## 2020-03-27 NOTE — TELEPHONE ENCOUNTER
Talked with pt she verbalized that her left leg started hurting a week ago  she said she has been working a lot at [employer] 60 hours so she is on her feet a lot she stated  it hurts to even pick her leg up to get into the car after she works. She stated that s

## 2020-03-27 NOTE — PROGRESS NOTES
Ferdinand Her is a 64year old female. HPI:   Rubens Gillis works at [employer]. Works in overnight section in meat department. percent  Has been working 60 % more due to covid pandemic. Her left shin has been very tender and achey.  Doing a lot of walking and stair • Visual impairment     glasses   • Vomiting 2017    Rescently has gotten worae   • Wears glasses 2009   • Weight loss Recent      Social History:  Social History    Tobacco Use      Smoking status: Former Smoker        Packs/day: 1.00        Years: 30.0

## 2020-03-27 NOTE — TELEPHONE ENCOUNTER
Is Kerline's leg swelling up? ( a concern for DVT) if one leg is swollen and painful then will need to be seen. Does she have varicose or spider veins?  Is so then  I recommend compression stockings which can help alleviate achey legs

## 2020-06-15 DIAGNOSIS — M17.32 POST-TRAUMATIC OSTEOARTHRITIS OF LEFT KNEE: ICD-10-CM

## 2020-06-15 RX ORDER — LISINOPRIL 40 MG/1
40 TABLET ORAL DAILY
Qty: 90 TABLET | Refills: 0 | Status: SHIPPED | OUTPATIENT
Start: 2020-06-15 | End: 2020-06-22

## 2020-06-15 RX ORDER — DICLOFENAC SODIUM 75 MG/1
75 TABLET, DELAYED RELEASE ORAL 2 TIMES DAILY
Qty: 60 TABLET | Refills: 0 | Status: SHIPPED | OUTPATIENT
Start: 2020-06-15 | End: 2020-08-11

## 2020-06-15 RX ORDER — ESCITALOPRAM OXALATE 20 MG/1
20 TABLET ORAL DAILY
Qty: 90 TABLET | Refills: 1 | Status: SHIPPED | OUTPATIENT
Start: 2020-06-15 | End: 2020-07-02 | Stop reason: ALTCHOICE

## 2020-06-15 RX ORDER — LISINOPRIL 40 MG/1
40 TABLET ORAL DAILY
Qty: 90 TABLET | Refills: 0 | OUTPATIENT
Start: 2020-06-15

## 2020-06-15 NOTE — TELEPHONE ENCOUNTER
Last Office Visit: 3/27/20  Last Refill:   Escitalopram 2/20/20  Lisinopril: 11/30/19  Last Labs:9/10/19

## 2020-06-17 ENCOUNTER — TELEPHONE (OUTPATIENT)
Dept: FAMILY MEDICINE CLINIC | Facility: CLINIC | Age: 60
End: 2020-06-17

## 2020-06-17 DIAGNOSIS — I10 ESSENTIAL HYPERTENSION: Primary | ICD-10-CM

## 2020-06-17 DIAGNOSIS — E78.00 HYPERCHOLESTEREMIA: ICD-10-CM

## 2020-06-22 ENCOUNTER — OFFICE VISIT (OUTPATIENT)
Dept: FAMILY MEDICINE CLINIC | Facility: CLINIC | Age: 60
End: 2020-06-22
Payer: COMMERCIAL

## 2020-06-22 VITALS
SYSTOLIC BLOOD PRESSURE: 140 MMHG | OXYGEN SATURATION: 98 % | HEART RATE: 74 BPM | DIASTOLIC BLOOD PRESSURE: 70 MMHG | BODY MASS INDEX: 40 KG/M2 | WEIGHT: 245 LBS | TEMPERATURE: 98 F

## 2020-06-22 DIAGNOSIS — G47.9 SLEEP DISORDER: Primary | ICD-10-CM

## 2020-06-22 PROCEDURE — 99214 OFFICE O/P EST MOD 30 MIN: CPT | Performed by: INTERNAL MEDICINE

## 2020-06-22 RX ORDER — SERTRALINE HYDROCHLORIDE 100 MG/1
100 TABLET, FILM COATED ORAL DAILY
Qty: 90 TABLET | Refills: 0 | Status: SHIPPED | OUTPATIENT
Start: 2020-06-22 | End: 2020-10-15

## 2020-06-22 RX ORDER — OMEPRAZOLE 40 MG/1
40 CAPSULE, DELAYED RELEASE ORAL DAILY
COMMUNITY
Start: 2020-05-06 | End: 2020-08-18
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
## 2020-06-23 PROBLEM — G47.9 SLEEP DISORDER: Status: ACTIVE | Noted: 2020-06-23

## 2020-06-25 ENCOUNTER — PATIENT MESSAGE (OUTPATIENT)
Dept: FAMILY MEDICINE CLINIC | Facility: CLINIC | Age: 60
End: 2020-06-25

## 2020-06-25 NOTE — TELEPHONE ENCOUNTER
From: Barney Pollard  To: Adam Zaldivar MD  Sent: 6/25/2020 3:24 PM CDT  Subject: Visit Follow-up Question    My youngest daughter lives with an employee from [employer]. They are quarantined for covid. I have been with her in the past week.  Do I need to

## 2020-06-25 NOTE — TELEPHONE ENCOUNTER
No, if symptoms you need to be tested, I would stay away form Peggy Thurman and giovana for a week to see if they develop symptoms.

## 2020-06-30 ENCOUNTER — LABORATORY ENCOUNTER (OUTPATIENT)
Dept: LAB | Age: 60
End: 2020-06-30
Attending: INTERNAL MEDICINE
Payer: COMMERCIAL

## 2020-06-30 DIAGNOSIS — E78.00 HYPERCHOLESTEREMIA: ICD-10-CM

## 2020-06-30 DIAGNOSIS — E78.00 ELEVATED CHOLESTEROL: ICD-10-CM

## 2020-06-30 DIAGNOSIS — I10 ESSENTIAL HYPERTENSION: ICD-10-CM

## 2020-06-30 LAB
ALBUMIN SERPL-MCNC: 3.7 G/DL (ref 3.4–5)
ALBUMIN/GLOB SERPL: 1 {RATIO} (ref 1–2)
ALP LIVER SERPL-CCNC: 99 U/L (ref 46–118)
ALT SERPL-CCNC: 42 U/L (ref 13–56)
ANION GAP SERPL CALC-SCNC: 4 MMOL/L (ref 0–18)
AST SERPL-CCNC: 24 U/L (ref 15–37)
BASOPHILS # BLD AUTO: 0.05 X10(3) UL (ref 0–0.2)
BASOPHILS NFR BLD AUTO: 0.7 %
BILIRUB SERPL-MCNC: 0.6 MG/DL (ref 0.1–2)
BUN BLD-MCNC: 21 MG/DL (ref 7–18)
BUN/CREAT SERPL: 24.7 (ref 10–20)
CALCIUM BLD-MCNC: 9.3 MG/DL (ref 8.5–10.1)
CHLORIDE SERPL-SCNC: 109 MMOL/L (ref 98–112)
CHOLEST SMN-MCNC: 201 MG/DL (ref ?–200)
CO2 SERPL-SCNC: 27 MMOL/L (ref 21–32)
CREAT BLD-MCNC: 0.85 MG/DL (ref 0.55–1.02)
DEPRECATED RDW RBC AUTO: 40.3 FL (ref 35.1–46.3)
EOSINOPHIL # BLD AUTO: 0.09 X10(3) UL (ref 0–0.7)
EOSINOPHIL NFR BLD AUTO: 1.3 %
ERYTHROCYTE [DISTWIDTH] IN BLOOD BY AUTOMATED COUNT: 11.9 % (ref 11–15)
GLOBULIN PLAS-MCNC: 3.7 G/DL (ref 2.8–4.4)
GLUCOSE BLD-MCNC: 90 MG/DL (ref 70–99)
HCT VFR BLD AUTO: 43.7 % (ref 35–48)
HDLC SERPL-MCNC: 56 MG/DL (ref 40–59)
HGB BLD-MCNC: 14.2 G/DL (ref 12–16)
IMM GRANULOCYTES # BLD AUTO: 0.02 X10(3) UL (ref 0–1)
IMM GRANULOCYTES NFR BLD: 0.3 %
LDLC SERPL CALC-MCNC: 106 MG/DL (ref ?–100)
LYMPHOCYTES # BLD AUTO: 2.56 X10(3) UL (ref 1–4)
LYMPHOCYTES NFR BLD AUTO: 38 %
M PROTEIN MFR SERPL ELPH: 7.4 G/DL (ref 6.4–8.2)
MCH RBC QN AUTO: 29.8 PG (ref 26–34)
MCHC RBC AUTO-ENTMCNC: 32.5 G/DL (ref 31–37)
MCV RBC AUTO: 91.6 FL (ref 80–100)
MONOCYTES # BLD AUTO: 0.49 X10(3) UL (ref 0.1–1)
MONOCYTES NFR BLD AUTO: 7.3 %
NEUTROPHILS # BLD AUTO: 3.52 X10 (3) UL (ref 1.5–7.7)
NEUTROPHILS # BLD AUTO: 3.52 X10(3) UL (ref 1.5–7.7)
NEUTROPHILS NFR BLD AUTO: 52.4 %
NONHDLC SERPL-MCNC: 145 MG/DL (ref ?–130)
OSMOLALITY SERPL CALC.SUM OF ELEC: 293 MOSM/KG (ref 275–295)
PATIENT FASTING Y/N/NP: YES
PATIENT FASTING Y/N/NP: YES
PLATELET # BLD AUTO: 330 10(3)UL (ref 150–450)
POTASSIUM SERPL-SCNC: 4.4 MMOL/L (ref 3.5–5.1)
RBC # BLD AUTO: 4.77 X10(6)UL (ref 3.8–5.3)
SODIUM SERPL-SCNC: 140 MMOL/L (ref 136–145)
TRIGL SERPL-MCNC: 197 MG/DL (ref 30–149)
VLDLC SERPL CALC-MCNC: 39 MG/DL (ref 0–30)
WBC # BLD AUTO: 6.7 X10(3) UL (ref 4–11)

## 2020-06-30 PROCEDURE — 80053 COMPREHEN METABOLIC PANEL: CPT | Performed by: INTERNAL MEDICINE

## 2020-06-30 PROCEDURE — 85025 COMPLETE CBC W/AUTO DIFF WBC: CPT | Performed by: INTERNAL MEDICINE

## 2020-06-30 PROCEDURE — 80061 LIPID PANEL: CPT | Performed by: INTERNAL MEDICINE

## 2020-06-30 PROCEDURE — 36415 COLL VENOUS BLD VENIPUNCTURE: CPT | Performed by: INTERNAL MEDICINE

## 2020-07-02 ENCOUNTER — OFFICE VISIT (OUTPATIENT)
Dept: FAMILY MEDICINE CLINIC | Facility: CLINIC | Age: 60
End: 2020-07-02
Payer: COMMERCIAL

## 2020-07-02 VITALS
TEMPERATURE: 99 F | BODY MASS INDEX: 39 KG/M2 | HEART RATE: 74 BPM | WEIGHT: 243 LBS | SYSTOLIC BLOOD PRESSURE: 124 MMHG | DIASTOLIC BLOOD PRESSURE: 70 MMHG | OXYGEN SATURATION: 98 %

## 2020-07-02 DIAGNOSIS — Z12.31 ENCOUNTER FOR SCREENING MAMMOGRAM FOR MALIGNANT NEOPLASM OF BREAST: Primary | ICD-10-CM

## 2020-07-02 PROCEDURE — 88175 CYTOPATH C/V AUTO FLUID REDO: CPT | Performed by: INTERNAL MEDICINE

## 2020-07-02 PROCEDURE — 99396 PREV VISIT EST AGE 40-64: CPT | Performed by: INTERNAL MEDICINE

## 2020-07-02 NOTE — PROGRESS NOTES
HPI:   Tessa Carranza is a 64year old female who presents for a complete physical exam. Symptoms: denies discharge, itching, burning or dysuria, is menopausal. Patient complains of anxiety and sometimes panic attack.        Immunization History  Administe total) by mouth daily. 90 tablet 0   • Diclofenac Sodium 75 MG Oral Tab EC Take 1 tablet (75 mg total) by mouth 2 (two) times daily. 60 tablet 0   • furosemide 40 MG Oral Tab Take 1 tablet (40 mg total) by mouth daily.  90 tablet 0   • Metoprolol Succinate Family History   Problem Relation Age of Onset   • Heart Disorder Father    • Diabetes Father         Passed   • Other (Other) Father    • Heart Attack Father         Passed   • Heart Disorder Mother    • Other (Other) Mother    • Hypertension Mother atraumatic, normocephalic,ears and throat are clear  EYES:PERRLA, EOMI, normal optic disk,conjunctiva are clear  NECK: supple,no adenopathy,no bruits  CHEST: no chest tenderness  BREAST: no dominant or suspicious mass  LUNGS: clear to auscultation  CARDIO:

## 2020-07-06 ENCOUNTER — TELEPHONE (OUTPATIENT)
Dept: FAMILY MEDICINE CLINIC | Facility: CLINIC | Age: 60
End: 2020-07-06

## 2020-07-06 DIAGNOSIS — E11.9 TYPE 2 DIABETES MELLITUS WITHOUT COMPLICATION, UNSPECIFIED WHETHER LONG TERM INSULIN USE (HCC): Primary | ICD-10-CM

## 2020-07-06 NOTE — TELEPHONE ENCOUNTER
The machine did not work, so there were no results. Patient was not aware. Do you want patient to come back for blood draw or finger stick if machine is working?

## 2020-07-07 ENCOUNTER — APPOINTMENT (OUTPATIENT)
Dept: LAB | Age: 60
End: 2020-07-07
Attending: INTERNAL MEDICINE
Payer: COMMERCIAL

## 2020-07-07 DIAGNOSIS — E11.9 TYPE 2 DIABETES MELLITUS WITHOUT COMPLICATION, UNSPECIFIED WHETHER LONG TERM INSULIN USE (HCC): ICD-10-CM

## 2020-07-07 PROCEDURE — 36415 COLL VENOUS BLD VENIPUNCTURE: CPT | Performed by: INTERNAL MEDICINE

## 2020-07-07 PROCEDURE — 83036 HEMOGLOBIN GLYCOSYLATED A1C: CPT | Performed by: INTERNAL MEDICINE

## 2020-07-08 ENCOUNTER — PATIENT MESSAGE (OUTPATIENT)
Dept: FAMILY MEDICINE CLINIC | Facility: CLINIC | Age: 60
End: 2020-07-08

## 2020-07-08 DIAGNOSIS — R07.89 SENSATION OF CHEST PRESSURE: Primary | ICD-10-CM

## 2020-07-08 DIAGNOSIS — G47.33 OSA (OBSTRUCTIVE SLEEP APNEA): ICD-10-CM

## 2020-07-08 LAB
EST. AVERAGE GLUCOSE BLD GHB EST-MCNC: 126 MG/DL (ref 68–126)
HBA1C MFR BLD HPLC: 6 % (ref ?–5.7)

## 2020-07-08 NOTE — TELEPHONE ENCOUNTER
From: Barney Pollard  To: Adam Zaldivar MD  Sent: 7/8/2020 8:06 AM CDT  Subject: Other    I have a question about HEMOGLOBIN A1C resulted on 7/8/20 at 6:58 AM.    Are you going to order the other test for my heart?

## 2020-07-14 RX ORDER — FUROSEMIDE 40 MG/1
40 TABLET ORAL DAILY
Qty: 90 TABLET | Refills: 0 | Status: SHIPPED | OUTPATIENT
Start: 2020-07-14 | End: 2020-11-01

## 2020-07-14 NOTE — TELEPHONE ENCOUNTER
Last refill: 03/02/20  QtY: 90  W/  0 refills  Last ov: 07/02/20    Requested Prescriptions     Pending Prescriptions Disp Refills   • furosemide 40 MG Oral Tab 90 tablet 0     Sig: Take 1 tablet (40 mg total) by mouth daily. No future appointments.

## 2020-08-11 DIAGNOSIS — M17.32 POST-TRAUMATIC OSTEOARTHRITIS OF LEFT KNEE: ICD-10-CM

## 2020-08-11 RX ORDER — DICLOFENAC SODIUM 75 MG/1
75 TABLET, DELAYED RELEASE ORAL 2 TIMES DAILY
Qty: 60 TABLET | Refills: 0 | Status: SHIPPED | OUTPATIENT
Start: 2020-08-11 | End: 2020-10-27

## 2020-08-11 RX ORDER — DICLOFENAC SODIUM 75 MG/1
75 TABLET, DELAYED RELEASE ORAL 2 TIMES DAILY
Qty: 60 TABLET | Refills: 0 | Status: SHIPPED | OUTPATIENT
Start: 2020-08-11 | End: 2020-11-05 | Stop reason: ALTCHOICE

## 2020-08-11 NOTE — TELEPHONE ENCOUNTER
Last refill: 06/15/20  Qty: 60  W/ 0 refills  Last ov: 07/02/20    Requested Prescriptions     Pending Prescriptions Disp Refills   • Diclofenac Sodium 75 MG Oral Tab EC 60 tablet 0     Sig: Take 1 tablet (75 mg total) by mouth 2 (two) times daily.      No

## 2020-08-19 ENCOUNTER — OFFICE VISIT (OUTPATIENT)
Dept: FAMILY MEDICINE CLINIC | Facility: CLINIC | Age: 60
End: 2020-08-19
Payer: COMMERCIAL

## 2020-08-19 ENCOUNTER — TELEPHONE (OUTPATIENT)
Dept: FAMILY MEDICINE CLINIC | Facility: CLINIC | Age: 60
End: 2020-08-19

## 2020-08-19 VITALS
BODY MASS INDEX: 39 KG/M2 | DIASTOLIC BLOOD PRESSURE: 70 MMHG | SYSTOLIC BLOOD PRESSURE: 110 MMHG | WEIGHT: 241 LBS | OXYGEN SATURATION: 96 % | TEMPERATURE: 98 F | HEART RATE: 68 BPM

## 2020-08-19 DIAGNOSIS — M17.12 PRIMARY OSTEOARTHRITIS OF LEFT KNEE: Primary | ICD-10-CM

## 2020-08-19 PROCEDURE — 99214 OFFICE O/P EST MOD 30 MIN: CPT | Performed by: INTERNAL MEDICINE

## 2020-08-19 PROCEDURE — 3074F SYST BP LT 130 MM HG: CPT | Performed by: INTERNAL MEDICINE

## 2020-08-19 PROCEDURE — 3078F DIAST BP <80 MM HG: CPT | Performed by: INTERNAL MEDICINE

## 2020-08-19 NOTE — TELEPHONE ENCOUNTER
SHE SAID THAT NOTE FOR HER WORK NEEDS TO STATE THE START DATE ON 8/18 SINCE SHE MISSED WORK YESTERDAY.    SHE WILL PICK IT UP TOMORROW

## 2020-08-19 NOTE — PROGRESS NOTES
Theodore Jones is a 64year old female. HPI:   Pt has been having pain in the left anterior shin for months. She has been taking diclofenac and 5 ALIEVE at a time no relief. She has primary osteoarthritis of the knees. Right replaced.    Current Outpatie • Uncomfortable fullness after meals Sept. 2019   • Unspecified essential hypertension    • Visual impairment     glasses   • Vomiting 2017    Rescently has gotten worae   • Wears glasses 2009   • Weight loss Recent      Social History:  Social History

## 2020-08-21 ENCOUNTER — HOSPITAL ENCOUNTER (OUTPATIENT)
Dept: MAMMOGRAPHY | Age: 60
Discharge: HOME | End: 2020-08-21
Attending: INTERNAL MEDICINE
Payer: COMMERCIAL

## 2020-08-21 DIAGNOSIS — Z12.31 ENCOUNTER FOR SCREENING MAMMOGRAM FOR MALIGNANT NEOPLASM OF BREAST: ICD-10-CM

## 2020-08-21 PROBLEM — M86.9 KNEE OSTEOMYELITS, RIGHT (HCC): Status: ACTIVE | Noted: 2020-08-21

## 2020-08-21 PROCEDURE — 77067 SCR MAMMO BI INCL CAD: CPT | Performed by: INTERNAL MEDICINE

## 2020-08-22 ENCOUNTER — PATIENT MESSAGE (OUTPATIENT)
Dept: FAMILY MEDICINE CLINIC | Facility: CLINIC | Age: 60
End: 2020-08-22

## 2020-08-23 NOTE — TELEPHONE ENCOUNTER
From: Kiara Simon  To:  Katalina Bonilla MD  Sent: 8/22/2020 12:33 PM CDT  Subject: Other    I have a question about SADIA SCREENING BILAT (CPT=77067) resulted on 8/21/20 at 1:29 PM.   You are saying normal . It says on report more imaging required on my lef

## 2020-10-14 ENCOUNTER — PATIENT MESSAGE (OUTPATIENT)
Dept: FAMILY MEDICINE CLINIC | Facility: CLINIC | Age: 60
End: 2020-10-14

## 2020-10-15 ENCOUNTER — PATIENT MESSAGE (OUTPATIENT)
Dept: FAMILY MEDICINE CLINIC | Facility: CLINIC | Age: 60
End: 2020-10-15

## 2020-10-15 RX ORDER — LISINOPRIL 40 MG/1
40 TABLET ORAL DAILY
Qty: 90 TABLET | Refills: 0 | Status: SHIPPED | OUTPATIENT
Start: 2020-10-15 | End: 2021-02-03

## 2020-10-15 RX ORDER — SERTRALINE HYDROCHLORIDE 100 MG/1
100 TABLET, FILM COATED ORAL DAILY
Qty: 90 TABLET | Refills: 0 | Status: SHIPPED | OUTPATIENT
Start: 2020-10-15 | End: 2021-02-04

## 2020-10-15 NOTE — TELEPHONE ENCOUNTER
From: Adamaris Hurt  To: Nichole Perez MD  Sent: 10/15/2020 11:19 AM CDT  Subject: Prescription Question    What about my Zoloft ? Ellie Spar . please

## 2020-10-15 NOTE — TELEPHONE ENCOUNTER
Last refill: 03/06/19  Qty: 90  W/ 0 refills  Last ov: 08/19/20    Requested Prescriptions     Pending Prescriptions Disp Refills   • lisinopril 40 MG Oral Tab 90 tablet 0     Sig: Take 1 tablet (40 mg total) by mouth daily.      Future Appointments   Date

## 2020-10-27 DIAGNOSIS — M17.32 POST-TRAUMATIC OSTEOARTHRITIS OF LEFT KNEE: ICD-10-CM

## 2020-10-27 RX ORDER — DICLOFENAC SODIUM 75 MG/1
75 TABLET, DELAYED RELEASE ORAL 2 TIMES DAILY
Qty: 60 TABLET | Refills: 0 | Status: ON HOLD | OUTPATIENT
Start: 2020-10-27 | End: 2020-12-02

## 2020-10-27 NOTE — TELEPHONE ENCOUNTER
Last refill: 08/11/20  Qty: 60  W/ 0 refills  Last ov: 08/19/20    Requested Prescriptions     Pending Prescriptions Disp Refills   • Diclofenac Sodium 75 MG Oral Tab EC 60 tablet 0     Sig: Take 1 tablet (75 mg total) by mouth 2 (two) times daily.      Fut

## 2020-10-28 RX ORDER — DICLOFENAC SODIUM 75 MG/1
75 TABLET, DELAYED RELEASE ORAL 2 TIMES DAILY
Qty: 60 TABLET | Refills: 0 | OUTPATIENT
Start: 2020-10-28

## 2020-10-28 NOTE — TELEPHONE ENCOUNTER
LOV    LAST LAB    LAST RX-    Next OV-  Future Appointments   Date Time Provider Manisha Osullivan   12/16/2020  1:20 PM Dave Jacobo MD [facility] GI            PROTOCOL- none

## 2020-10-29 ENCOUNTER — PATIENT MESSAGE (OUTPATIENT)
Dept: FAMILY MEDICINE CLINIC | Facility: CLINIC | Age: 60
End: 2020-10-29

## 2020-10-29 NOTE — TELEPHONE ENCOUNTER
From: Lewis Hollis  To: Rigoberto Mckeon MD  Sent: 10/29/2020 3:17 PM CDT  Subject: Other    I need to get this knee done or else I will lose my job. Danielito Palma also my  was nice enough to got to bat for me and forgive absents. .talked to HR for me.  He

## 2020-10-30 ENCOUNTER — PATIENT MESSAGE (OUTPATIENT)
Dept: FAMILY MEDICINE CLINIC | Facility: CLINIC | Age: 60
End: 2020-10-30

## 2020-10-30 NOTE — TELEPHONE ENCOUNTER
From: Evelyn Gonzales  To:  Sage Byrnes MD  Sent: 10/30/2020 3:51 PM CDT  Subject: Other    11)11/2020 is my new appointment at 6 o click

## 2020-11-02 RX ORDER — FUROSEMIDE 40 MG/1
40 TABLET ORAL DAILY
Qty: 90 TABLET | Refills: 0 | Status: SHIPPED | OUTPATIENT
Start: 2020-11-02 | End: 2021-03-10

## 2020-11-02 NOTE — TELEPHONE ENCOUNTER
Last refill: 07/14/20  Qty: 90  W/  0 refills  Last ov: 08/19/20    Requested Prescriptions     Pending Prescriptions Disp Refills   • furosemide 40 MG Oral Tab 90 tablet 0     Sig: Take 1 tablet (40 mg total) by mouth daily.      Future Appointments   Date

## 2020-11-05 ENCOUNTER — TELEPHONE (OUTPATIENT)
Dept: FAMILY MEDICINE CLINIC | Facility: CLINIC | Age: 60
End: 2020-11-05

## 2020-11-05 RX ORDER — MELATONIN
325
COMMUNITY
End: 2021-03-31

## 2020-11-05 RX ORDER — MELATONIN
1000 DAILY
COMMUNITY
End: 2021-03-20

## 2020-11-05 RX ORDER — MULTIVIT-MIN/IRON FUM/FOLIC AC 7.5 MG-4
1 TABLET ORAL DAILY
COMMUNITY

## 2020-11-05 RX ORDER — ACETAMINOPHEN 500 MG
1000 TABLET ORAL ONCE
Status: CANCELLED | OUTPATIENT
Start: 2020-11-05 | End: 2020-11-05

## 2020-11-05 NOTE — TELEPHONE ENCOUNTER
PATIENT IS SCHEDULED TO SEE DR Allison Files ON  11/11/2020 FOR PREOP AND NEEDS TYPE AND SCREEN DONE. CAN SHE HAVE IT DONE HERE WITH LAB?

## 2020-11-10 ENCOUNTER — TELEMEDICINE (OUTPATIENT)
Dept: FAMILY MEDICINE CLINIC | Facility: CLINIC | Age: 60
End: 2020-11-10
Payer: COMMERCIAL

## 2020-11-10 ENCOUNTER — TELEPHONE (OUTPATIENT)
Dept: FAMILY MEDICINE CLINIC | Facility: CLINIC | Age: 60
End: 2020-11-10

## 2020-11-10 DIAGNOSIS — Z20.822 EXPOSURE TO COVID-19 VIRUS: Primary | ICD-10-CM

## 2020-11-10 PROCEDURE — 99213 OFFICE O/P EST LOW 20 MIN: CPT | Performed by: INTERNAL MEDICINE

## 2020-11-10 NOTE — PROGRESS NOTES
Angela Durand is a 64year old female. HPI:   Pt has been exposed to Sonia at work, has an upcoming surgery that she does not want to miss.    Current Outpatient Medications   Medication Sig Dispense Refill   • Multiple Vitamins-Minerals (MULTI-VITAMIN/M • Unspecified essential hypertension    • Visual impairment     glasses   • Vomiting 2017    Rescently has gotten worae   • Wears glasses 2009   • Weight loss Recent      Social History:  Social History    Tobacco Use      Smoking status: Former Smoker

## 2020-11-10 NOTE — TELEPHONE ENCOUNTER
Virtual/Telephone Check-In    Tessa Carranza verbally consents to a Virtual/Telephone Check-In service on 11/10/2020. Patient has been referred to the [facility] website at [email or web address] to review the yearly Consent to Treat document.   Patient under

## 2020-11-12 ENCOUNTER — APPOINTMENT (OUTPATIENT)
Dept: LAB | Age: 60
End: 2020-11-12
Attending: INTERNAL MEDICINE
Payer: COMMERCIAL

## 2020-11-12 DIAGNOSIS — Z20.822 EXPOSURE TO COVID-19 VIRUS: ICD-10-CM

## 2020-11-13 ENCOUNTER — TELEPHONE (OUTPATIENT)
Dept: FAMILY MEDICINE CLINIC | Facility: CLINIC | Age: 60
End: 2020-11-13

## 2020-11-13 ENCOUNTER — PATIENT MESSAGE (OUTPATIENT)
Dept: FAMILY MEDICINE CLINIC | Facility: CLINIC | Age: 60
End: 2020-11-13

## 2020-11-13 RX ORDER — METOPROLOL SUCCINATE 50 MG/1
50 TABLET, EXTENDED RELEASE ORAL DAILY
Qty: 90 TABLET | Refills: 3 | Status: SHIPPED | OUTPATIENT
Start: 2020-11-13 | End: 2021-03-10

## 2020-11-13 RX ORDER — OXYBUTYNIN CHLORIDE 10 MG/1
10 TABLET, EXTENDED RELEASE ORAL DAILY
Qty: 90 TABLET | Refills: 3 | Status: SHIPPED | OUTPATIENT
Start: 2020-11-13 | End: 2021-06-17

## 2020-11-13 NOTE — TELEPHONE ENCOUNTER
Last refill x 1 year on 10/20/19  Last office visit on 11/10/2020 -  Acute  Future Appointments   Date Time Provider Manisha Osullivan   11/21/2020 10:45 AM Jeff Aguillon MD [facility] [place]   11/29/2020 10:00 AM [facility] COVID RESOURCE [address] [place] [address]

## 2020-11-13 NOTE — TELEPHONE ENCOUNTER
From: Edward Gutierrez  To: Anayeli Barger MD  Sent: 11/13/2020 1:51 PM CST  Subject: Other    Covid test.   Ana Maria Gallardo let Harinder know I could go back to work on the 15th if I was felt well. Don't I have to wait for the test to come back ?     Were you going to

## 2020-11-13 NOTE — TELEPHONE ENCOUNTER
Last refill: 11/12/19 #90 w/ 3 refills    Last OV: 11/10/20 (telemed)  Last labs: 6/30/20    Future Appointments   Date Time Provider Manisha Shi   11/21/2020 10:45 AM Olinda Bowens MD [facility] [place]   11/29/2020 10:00 AM  COVID [facility] [place]

## 2020-11-15 ENCOUNTER — PATIENT MESSAGE (OUTPATIENT)
Dept: FAMILY MEDICINE CLINIC | Facility: CLINIC | Age: 60
End: 2020-11-15

## 2020-11-16 NOTE — TELEPHONE ENCOUNTER
From: Gladys Schafer  To: Davy Nina MD  Sent: 11/15/2020 8:16 PM CST  Subject: Other    Can you please print me 2 copies of my covid test results? I can pick them up tomorrow morning 11 before I go to work.       Thank you,   Cory Albright

## 2020-11-21 ENCOUNTER — TELEPHONE (OUTPATIENT)
Dept: FAMILY MEDICINE CLINIC | Facility: CLINIC | Age: 60
End: 2020-11-21

## 2020-11-21 ENCOUNTER — OFFICE VISIT (OUTPATIENT)
Dept: FAMILY MEDICINE CLINIC | Facility: CLINIC | Age: 60
End: 2020-11-21
Payer: COMMERCIAL

## 2020-11-21 VITALS
DIASTOLIC BLOOD PRESSURE: 80 MMHG | HEART RATE: 64 BPM | SYSTOLIC BLOOD PRESSURE: 124 MMHG | WEIGHT: 251 LBS | HEIGHT: 66 IN | BODY MASS INDEX: 40.34 KG/M2 | OXYGEN SATURATION: 98 % | TEMPERATURE: 97 F

## 2020-11-21 DIAGNOSIS — M17.32 POST-TRAUMATIC OSTEOARTHRITIS OF LEFT KNEE: ICD-10-CM

## 2020-11-21 DIAGNOSIS — E78.00 HYPERCHOLESTEREMIA: ICD-10-CM

## 2020-11-21 DIAGNOSIS — Z01.810 PREOP CARDIOVASCULAR EXAM: Primary | ICD-10-CM

## 2020-11-21 DIAGNOSIS — I10 ESSENTIAL HYPERTENSION: ICD-10-CM

## 2020-11-21 DIAGNOSIS — E11.9 TYPE 2 DIABETES MELLITUS WITHOUT COMPLICATION, UNSPECIFIED WHETHER LONG TERM INSULIN USE (HCC): ICD-10-CM

## 2020-11-21 PROCEDURE — 99072 ADDL SUPL MATRL&STAF TM PHE: CPT | Performed by: INTERNAL MEDICINE

## 2020-11-21 PROCEDURE — 86901 BLOOD TYPING SEROLOGIC RH(D): CPT | Performed by: INTERNAL MEDICINE

## 2020-11-21 PROCEDURE — 90471 IMMUNIZATION ADMIN: CPT | Performed by: INTERNAL MEDICINE

## 2020-11-21 PROCEDURE — 36415 COLL VENOUS BLD VENIPUNCTURE: CPT | Performed by: INTERNAL MEDICINE

## 2020-11-21 PROCEDURE — 81003 URINALYSIS AUTO W/O SCOPE: CPT | Performed by: INTERNAL MEDICINE

## 2020-11-21 PROCEDURE — 86900 BLOOD TYPING SEROLOGIC ABO: CPT | Performed by: INTERNAL MEDICINE

## 2020-11-21 PROCEDURE — 86850 RBC ANTIBODY SCREEN: CPT | Performed by: INTERNAL MEDICINE

## 2020-11-21 PROCEDURE — 90686 IIV4 VACC NO PRSV 0.5 ML IM: CPT | Performed by: INTERNAL MEDICINE

## 2020-11-21 PROCEDURE — 3074F SYST BP LT 130 MM HG: CPT | Performed by: INTERNAL MEDICINE

## 2020-11-21 PROCEDURE — 83036 HEMOGLOBIN GLYCOSYLATED A1C: CPT | Performed by: INTERNAL MEDICINE

## 2020-11-21 PROCEDURE — 85610 PROTHROMBIN TIME: CPT | Performed by: INTERNAL MEDICINE

## 2020-11-21 PROCEDURE — 85730 THROMBOPLASTIN TIME PARTIAL: CPT | Performed by: INTERNAL MEDICINE

## 2020-11-21 PROCEDURE — 99244 OFF/OP CNSLTJ NEW/EST MOD 40: CPT | Performed by: INTERNAL MEDICINE

## 2020-11-21 PROCEDURE — 80053 COMPREHEN METABOLIC PANEL: CPT | Performed by: INTERNAL MEDICINE

## 2020-11-21 PROCEDURE — 93000 ELECTROCARDIOGRAM COMPLETE: CPT | Performed by: INTERNAL MEDICINE

## 2020-11-21 PROCEDURE — 85025 COMPLETE CBC W/AUTO DIFF WBC: CPT | Performed by: INTERNAL MEDICINE

## 2020-11-21 PROCEDURE — 3008F BODY MASS INDEX DOCD: CPT | Performed by: INTERNAL MEDICINE

## 2020-11-21 PROCEDURE — 87081 CULTURE SCREEN ONLY: CPT | Performed by: INTERNAL MEDICINE

## 2020-11-21 PROCEDURE — 3079F DIAST BP 80-89 MM HG: CPT | Performed by: INTERNAL MEDICINE

## 2020-11-21 NOTE — PROGRESS NOTES
Yobani Stuart is a 64year old female who presents for a pre-operative physical exam. Patient is to have left knee replacement, to be done by Dr. Dar Ellis at [facility] on 12/2/2020. HPI:   Pt complains of chronic left knee pain.   Had right done la • High cholesterol    • History of depression 2016   • Irregular bowel habits 2017   • Loss of appetite Recent   • Nausea 2017   • Osteoarthritis     knees   • Other and unspecified hyperlipidemia    • Pain in joints June 2019    Right knee replacement feels well otherwise  SKIN: denies any unusual skin lesions  EYES:denies blurred vision or double vision  HEENT: denies nasal congestion, sinus pain or ST  LUNGS: denies shortness of breath with exertion  CARDIOVASCULAR: denies chest pain on exertion  GI: Value Date    WBC 6.7 11/21/2020    HGB 12.4 11/21/2020    HCT 37.7 11/21/2020    .0 11/21/2020    CREATSERUM 0.78 11/21/2020    BUN 22 11/21/2020     11/21/2020    K 4.4 11/21/2020     11/21/2020    CO2 30.0 11/21/2020     11/21/

## 2020-11-23 ENCOUNTER — TELEPHONE (OUTPATIENT)
Dept: FAMILY MEDICINE CLINIC | Facility: CLINIC | Age: 60
End: 2020-11-23

## 2020-11-25 ENCOUNTER — TELEPHONE (OUTPATIENT)
Dept: FAMILY MEDICINE CLINIC | Facility: CLINIC | Age: 60
End: 2020-11-25

## 2020-11-29 ENCOUNTER — APPOINTMENT (OUTPATIENT)
Dept: LAB | Facility: HOSPITAL | Age: 60
End: 2020-11-29
Attending: ORTHOPAEDIC SURGERY
Payer: COMMERCIAL

## 2020-11-29 DIAGNOSIS — M17.12 PRIMARY OSTEOARTHRITIS OF LEFT KNEE: ICD-10-CM

## 2020-11-30 NOTE — H&P
[facility]  History & Physical    Yobani Sade Patient Status:  Outpatient in a Bed    3/23/1960 MRN [id number]   [facility] SURGERY Attending Mike Gtz MD   Hosp Day # 0 PCP Jovana Potts MD     Date of Admission:  (Not on Disorder Father    • Diabetes Father         Passed   • Other (Other) Father    • Heart Attack Father         Passed   • Heart Disorder Mother    • Other (Other) Mother    • Hypertension Mother         Passed   • Heart Attack Mother         Passed   • Ment for malignant neoplasms, colon     Polyp, sigmoid colon     GERD with esophagitis     Gallagher's esophagus without dysplasia     Diaphragmatic hernia without obstruction or gangrene     Sleep disorder     Knee osteomyelits, right ([facility] [address]      Plan is for ceme

## 2020-12-02 ENCOUNTER — ANESTHESIA EVENT (OUTPATIENT)
Dept: SURGERY | Facility: HOSPITAL | Age: 60
End: 2020-12-02
Payer: COMMERCIAL

## 2020-12-02 ENCOUNTER — ANESTHESIA (OUTPATIENT)
Dept: SURGERY | Facility: HOSPITAL | Age: 60
End: 2020-12-02
Payer: COMMERCIAL

## 2020-12-02 ENCOUNTER — HOSPITAL ENCOUNTER (OUTPATIENT)
Facility: HOSPITAL | Age: 60
Discharge: HOME HEALTH | End: 2020-12-03
Attending: ORTHOPAEDIC SURGERY | Admitting: ORTHOPAEDIC SURGERY
Payer: COMMERCIAL

## 2020-12-02 DIAGNOSIS — M17.12 PRIMARY OSTEOARTHRITIS OF LEFT KNEE: Primary | ICD-10-CM

## 2020-12-02 PROCEDURE — 76942 ECHO GUIDE FOR BIOPSY: CPT | Performed by: ANESTHESIOLOGY

## 2020-12-02 PROCEDURE — 99245 OFF/OP CONSLTJ NEW/EST HI 55: CPT | Performed by: HOSPITALIST

## 2020-12-02 PROCEDURE — 0SRD0J9 REPLACEMENT OF LEFT KNEE JOINT WITH SYNTHETIC SUBSTITUTE, CEMENTED, OPEN APPROACH: ICD-10-PCS | Performed by: ORTHOPAEDIC SURGERY

## 2020-12-02 DEVICE — ATTUNE KNEE SYSTEM FEMORAL POSTERIOR STABILIZED SIZE 6 LEFT CEMENTED
Type: IMPLANTABLE DEVICE | Site: KNEE | Status: FUNCTIONAL
Brand: ATTUNE

## 2020-12-02 DEVICE — SMARTSET HV HIGH VISCOSITY BONE CEMENT 40G
Type: IMPLANTABLE DEVICE | Site: KNEE | Status: FUNCTIONAL
Brand: SMARTSET

## 2020-12-02 DEVICE — ATTUNE KNEE SYSTEM TIBIAL BASE ROTATING PLATFORM SIZE 6 CEMENTED
Type: IMPLANTABLE DEVICE | Site: KNEE | Status: FUNCTIONAL
Brand: ATTUNE

## 2020-12-02 DEVICE — ATTUNE KNEE SYSTEM TIBIAL INSERT ROTATING PLATFORM POSTERIOR STABILIZED 6 6MM AOX
Type: IMPLANTABLE DEVICE | Site: KNEE | Status: FUNCTIONAL
Brand: ATTUNE

## 2020-12-02 DEVICE — ATTUNE PATELLA MEDIALIZED DOME 38MM CEMENTED AOX
Type: IMPLANTABLE DEVICE | Site: KNEE | Status: FUNCTIONAL
Brand: ATTUNE

## 2020-12-02 RX ORDER — CLONIDINE 100 UG/ML
INJECTION, SOLUTION EPIDURAL AS NEEDED
Status: DISCONTINUED | OUTPATIENT
Start: 2020-12-02 | End: 2020-12-02 | Stop reason: SURG

## 2020-12-02 RX ORDER — METOCLOPRAMIDE HYDROCHLORIDE 5 MG/ML
10 INJECTION INTRAMUSCULAR; INTRAVENOUS AS NEEDED
Status: DISCONTINUED | OUTPATIENT
Start: 2020-12-02 | End: 2020-12-02 | Stop reason: HOSPADM

## 2020-12-02 RX ORDER — SODIUM CHLORIDE, SODIUM LACTATE, POTASSIUM CHLORIDE, CALCIUM CHLORIDE 600; 310; 30; 20 MG/100ML; MG/100ML; MG/100ML; MG/100ML
INJECTION, SOLUTION INTRAVENOUS CONTINUOUS
Status: DISCONTINUED | OUTPATIENT
Start: 2020-12-02 | End: 2020-12-02 | Stop reason: HOSPADM

## 2020-12-02 RX ORDER — DEXAMETHASONE SODIUM PHOSPHATE 10 MG/ML
INJECTION, SOLUTION INTRAMUSCULAR; INTRAVENOUS AS NEEDED
Status: DISCONTINUED | OUTPATIENT
Start: 2020-12-02 | End: 2020-12-02 | Stop reason: SURG

## 2020-12-02 RX ORDER — DIPHENHYDRAMINE HYDROCHLORIDE 50 MG/ML
25 INJECTION INTRAMUSCULAR; INTRAVENOUS ONCE AS NEEDED
Status: ACTIVE | OUTPATIENT
Start: 2020-12-02 | End: 2020-12-02

## 2020-12-02 RX ORDER — ASPIRIN 325 MG
325 TABLET ORAL 2 TIMES DAILY
Status: DISCONTINUED | OUTPATIENT
Start: 2020-12-02 | End: 2020-12-03

## 2020-12-02 RX ORDER — ONDANSETRON 2 MG/ML
4 INJECTION INTRAMUSCULAR; INTRAVENOUS AS NEEDED
Status: DISCONTINUED | OUTPATIENT
Start: 2020-12-02 | End: 2020-12-02 | Stop reason: HOSPADM

## 2020-12-02 RX ORDER — HYDROCODONE BITARTRATE AND ACETAMINOPHEN 10; 325 MG/1; MG/1
1-2 TABLET ORAL EVERY 4 HOURS PRN
Qty: 60 TABLET | Refills: 0 | Status: SHIPPED | OUTPATIENT
Start: 2020-12-02 | End: 2020-12-09

## 2020-12-02 RX ORDER — BISACODYL 10 MG
10 SUPPOSITORY, RECTAL RECTAL
Status: DISCONTINUED | OUTPATIENT
Start: 2020-12-02 | End: 2020-12-03

## 2020-12-02 RX ORDER — DEXAMETHASONE SODIUM PHOSPHATE 10 MG/ML
8 INJECTION, SOLUTION INTRAMUSCULAR; INTRAVENOUS ONCE
Status: COMPLETED | OUTPATIENT
Start: 2020-12-03 | End: 2020-12-03

## 2020-12-02 RX ORDER — SODIUM CHLORIDE, SODIUM LACTATE, POTASSIUM CHLORIDE, CALCIUM CHLORIDE 600; 310; 30; 20 MG/100ML; MG/100ML; MG/100ML; MG/100ML
INJECTION, SOLUTION INTRAVENOUS CONTINUOUS
Status: DISCONTINUED | OUTPATIENT
Start: 2020-12-02 | End: 2020-12-03

## 2020-12-02 RX ORDER — DOCUSATE SODIUM 100 MG/1
100 CAPSULE, LIQUID FILLED ORAL 2 TIMES DAILY
Status: DISCONTINUED | OUTPATIENT
Start: 2020-12-02 | End: 2020-12-03

## 2020-12-02 RX ORDER — HYDROMORPHONE HYDROCHLORIDE 1 MG/ML
0.4 INJECTION, SOLUTION INTRAMUSCULAR; INTRAVENOUS; SUBCUTANEOUS EVERY 2 HOUR PRN
Status: DISCONTINUED | OUTPATIENT
Start: 2020-12-02 | End: 2020-12-03

## 2020-12-02 RX ORDER — MEPERIDINE HYDROCHLORIDE 25 MG/ML
12.5 INJECTION INTRAMUSCULAR; INTRAVENOUS; SUBCUTANEOUS AS NEEDED
Status: DISCONTINUED | OUTPATIENT
Start: 2020-12-02 | End: 2020-12-02 | Stop reason: HOSPADM

## 2020-12-02 RX ORDER — DIPHENHYDRAMINE HCL 25 MG
25 CAPSULE ORAL EVERY 4 HOURS PRN
Status: DISCONTINUED | OUTPATIENT
Start: 2020-12-02 | End: 2020-12-03

## 2020-12-02 RX ORDER — TRANEXAMIC ACID 10 MG/ML
1000 INJECTION, SOLUTION INTRAVENOUS ONCE
Status: COMPLETED | OUTPATIENT
Start: 2020-12-02 | End: 2020-12-02

## 2020-12-02 RX ORDER — SENNOSIDES 8.6 MG
17.2 TABLET ORAL NIGHTLY
Status: DISCONTINUED | OUTPATIENT
Start: 2020-12-02 | End: 2020-12-03

## 2020-12-02 RX ORDER — MIDAZOLAM HYDROCHLORIDE 1 MG/ML
1 INJECTION INTRAMUSCULAR; INTRAVENOUS EVERY 5 MIN PRN
Status: DISCONTINUED | OUTPATIENT
Start: 2020-12-02 | End: 2020-12-02 | Stop reason: HOSPADM

## 2020-12-02 RX ORDER — CEFAZOLIN SODIUM/WATER 2 G/20 ML
2 SYRINGE (ML) INTRAVENOUS ONCE
Status: COMPLETED | OUTPATIENT
Start: 2020-12-02 | End: 2020-12-02

## 2020-12-02 RX ORDER — HYDROMORPHONE HYDROCHLORIDE 1 MG/ML
0.2 INJECTION, SOLUTION INTRAMUSCULAR; INTRAVENOUS; SUBCUTANEOUS EVERY 2 HOUR PRN
Status: DISCONTINUED | OUTPATIENT
Start: 2020-12-02 | End: 2020-12-03

## 2020-12-02 RX ORDER — PROCHLORPERAZINE EDISYLATE 5 MG/ML
10 INJECTION INTRAMUSCULAR; INTRAVENOUS EVERY 6 HOURS PRN
Status: DISCONTINUED | OUTPATIENT
Start: 2020-12-02 | End: 2020-12-03

## 2020-12-02 RX ORDER — TRAMADOL HYDROCHLORIDE 50 MG/1
50 TABLET ORAL EVERY 6 HOURS
Status: DISCONTINUED | OUTPATIENT
Start: 2020-12-02 | End: 2020-12-03

## 2020-12-02 RX ORDER — DIPHENHYDRAMINE HYDROCHLORIDE 50 MG/ML
12.5 INJECTION INTRAMUSCULAR; INTRAVENOUS AS NEEDED
Status: DISCONTINUED | OUTPATIENT
Start: 2020-12-02 | End: 2020-12-02 | Stop reason: HOSPADM

## 2020-12-02 RX ORDER — SERTRALINE HYDROCHLORIDE 100 MG/1
100 TABLET, FILM COATED ORAL DAILY
Status: DISCONTINUED | OUTPATIENT
Start: 2020-12-03 | End: 2020-12-03

## 2020-12-02 RX ORDER — OXYBUTYNIN CHLORIDE 10 MG/1
10 TABLET, EXTENDED RELEASE ORAL DAILY
Status: DISCONTINUED | OUTPATIENT
Start: 2020-12-03 | End: 2020-12-03

## 2020-12-02 RX ORDER — MIDAZOLAM HYDROCHLORIDE 1 MG/ML
INJECTION INTRAMUSCULAR; INTRAVENOUS
Status: COMPLETED
Start: 2020-12-02 | End: 2020-12-02

## 2020-12-02 RX ORDER — METOPROLOL SUCCINATE 50 MG/1
50 TABLET, EXTENDED RELEASE ORAL
Status: DISCONTINUED | OUTPATIENT
Start: 2020-12-03 | End: 2020-12-03

## 2020-12-02 RX ORDER — TIZANIDINE 2 MG/1
2 TABLET ORAL 3 TIMES DAILY PRN
Status: DISCONTINUED | OUTPATIENT
Start: 2020-12-02 | End: 2020-12-03

## 2020-12-02 RX ORDER — ZOLPIDEM TARTRATE 5 MG/1
5 TABLET ORAL NIGHTLY PRN
Status: DISCONTINUED | OUTPATIENT
Start: 2020-12-02 | End: 2020-12-03

## 2020-12-02 RX ORDER — POLYETHYLENE GLYCOL 3350 17 G/17G
17 POWDER, FOR SOLUTION ORAL DAILY PRN
Status: DISCONTINUED | OUTPATIENT
Start: 2020-12-02 | End: 2020-12-03

## 2020-12-02 RX ORDER — ATORVASTATIN CALCIUM 40 MG/1
40 TABLET, FILM COATED ORAL NIGHTLY
Status: DISCONTINUED | OUTPATIENT
Start: 2020-12-02 | End: 2020-12-03

## 2020-12-02 RX ORDER — ASPIRIN 325 MG
325 TABLET, DELAYED RELEASE (ENTERIC COATED) ORAL 2 TIMES DAILY
Qty: 24 TABLET | Refills: 0 | Status: SHIPPED | OUTPATIENT
Start: 2020-12-02 | End: 2021-03-15

## 2020-12-02 RX ORDER — SODIUM PHOSPHATE, DIBASIC AND SODIUM PHOSPHATE, MONOBASIC 7; 19 G/133ML; G/133ML
1 ENEMA RECTAL ONCE AS NEEDED
Status: DISCONTINUED | OUTPATIENT
Start: 2020-12-02 | End: 2020-12-03

## 2020-12-02 RX ORDER — ONDANSETRON 2 MG/ML
INJECTION INTRAMUSCULAR; INTRAVENOUS AS NEEDED
Status: DISCONTINUED | OUTPATIENT
Start: 2020-12-02 | End: 2020-12-02 | Stop reason: SURG

## 2020-12-02 RX ORDER — HYDROMORPHONE HYDROCHLORIDE 1 MG/ML
0.4 INJECTION, SOLUTION INTRAMUSCULAR; INTRAVENOUS; SUBCUTANEOUS EVERY 5 MIN PRN
Status: DISCONTINUED | OUTPATIENT
Start: 2020-12-02 | End: 2020-12-02 | Stop reason: HOSPADM

## 2020-12-02 RX ORDER — PANTOPRAZOLE SODIUM 40 MG/1
40 TABLET, DELAYED RELEASE ORAL
Refills: 0 | Status: DISCONTINUED | OUTPATIENT
Start: 2020-12-03 | End: 2020-12-03

## 2020-12-02 RX ORDER — CEFAZOLIN SODIUM/WATER 2 G/20 ML
2 SYRINGE (ML) INTRAVENOUS EVERY 8 HOURS
Status: COMPLETED | OUTPATIENT
Start: 2020-12-02 | End: 2020-12-03

## 2020-12-02 RX ORDER — OXYCODONE HYDROCHLORIDE 10 MG/1
10 TABLET ORAL EVERY 4 HOURS PRN
Status: DISCONTINUED | OUTPATIENT
Start: 2020-12-02 | End: 2020-12-03

## 2020-12-02 RX ORDER — HYDROMORPHONE HYDROCHLORIDE 1 MG/ML
0.8 INJECTION, SOLUTION INTRAMUSCULAR; INTRAVENOUS; SUBCUTANEOUS EVERY 2 HOUR PRN
Status: DISCONTINUED | OUTPATIENT
Start: 2020-12-02 | End: 2020-12-03

## 2020-12-02 RX ORDER — OXYCODONE HYDROCHLORIDE 15 MG/1
15 TABLET ORAL EVERY 4 HOURS PRN
Status: DISCONTINUED | OUTPATIENT
Start: 2020-12-02 | End: 2020-12-03

## 2020-12-02 RX ORDER — ACETAMINOPHEN 500 MG
1000 TABLET ORAL 4 TIMES DAILY
Status: DISCONTINUED | OUTPATIENT
Start: 2020-12-02 | End: 2020-12-03

## 2020-12-02 RX ORDER — ONDANSETRON 2 MG/ML
4 INJECTION INTRAMUSCULAR; INTRAVENOUS EVERY 4 HOURS PRN
Status: DISCONTINUED | OUTPATIENT
Start: 2020-12-02 | End: 2020-12-03

## 2020-12-02 RX ORDER — HYDROMORPHONE HYDROCHLORIDE 1 MG/ML
INJECTION, SOLUTION INTRAMUSCULAR; INTRAVENOUS; SUBCUTANEOUS
Status: COMPLETED
Start: 2020-12-02 | End: 2020-12-02

## 2020-12-02 RX ORDER — SCOLOPAMINE TRANSDERMAL SYSTEM 1 MG/1
1 PATCH, EXTENDED RELEASE TRANSDERMAL ONCE
Status: DISCONTINUED | OUTPATIENT
Start: 2020-12-02 | End: 2020-12-03

## 2020-12-02 RX ORDER — MELATONIN
325
Status: DISCONTINUED | OUTPATIENT
Start: 2020-12-03 | End: 2020-12-03

## 2020-12-02 RX ORDER — DEXAMETHASONE SODIUM PHOSPHATE 4 MG/ML
VIAL (ML) INJECTION AS NEEDED
Status: DISCONTINUED | OUTPATIENT
Start: 2020-12-02 | End: 2020-12-02 | Stop reason: SURG

## 2020-12-02 RX ORDER — BUPRENORPHINE HYDROCHLORIDE 0.32 MG/ML
INJECTION INTRAMUSCULAR; INTRAVENOUS AS NEEDED
Status: DISCONTINUED | OUTPATIENT
Start: 2020-12-02 | End: 2020-12-02 | Stop reason: SURG

## 2020-12-02 RX ORDER — DIPHENHYDRAMINE HYDROCHLORIDE 50 MG/ML
12.5 INJECTION INTRAMUSCULAR; INTRAVENOUS EVERY 4 HOURS PRN
Status: DISCONTINUED | OUTPATIENT
Start: 2020-12-02 | End: 2020-12-03

## 2020-12-02 RX ORDER — MAGNESIUM HYDROXIDE 1200 MG/15ML
LIQUID ORAL CONTINUOUS PRN
Status: COMPLETED | OUTPATIENT
Start: 2020-12-02 | End: 2020-12-02

## 2020-12-02 RX ORDER — KETOROLAC TROMETHAMINE 30 MG/ML
30 INJECTION, SOLUTION INTRAMUSCULAR; INTRAVENOUS EVERY 6 HOURS
Status: COMPLETED | OUTPATIENT
Start: 2020-12-02 | End: 2020-12-03

## 2020-12-02 RX ORDER — OXYCODONE HYDROCHLORIDE 5 MG/1
5 TABLET ORAL EVERY 4 HOURS PRN
Status: DISCONTINUED | OUTPATIENT
Start: 2020-12-02 | End: 2020-12-03

## 2020-12-02 RX ORDER — NALOXONE HYDROCHLORIDE 0.4 MG/ML
80 INJECTION, SOLUTION INTRAMUSCULAR; INTRAVENOUS; SUBCUTANEOUS AS NEEDED
Status: DISCONTINUED | OUTPATIENT
Start: 2020-12-02 | End: 2020-12-02 | Stop reason: HOSPADM

## 2020-12-02 RX ADMIN — CEFAZOLIN SODIUM/WATER 2 G: 2 G/20 ML SYRINGE (ML) INTRAVENOUS at 14:05:00

## 2020-12-02 RX ADMIN — SODIUM CHLORIDE, SODIUM LACTATE, POTASSIUM CHLORIDE, CALCIUM CHLORIDE: 600; 310; 30; 20 INJECTION, SOLUTION INTRAVENOUS at 13:52:00

## 2020-12-02 RX ADMIN — DEXAMETHASONE SODIUM PHOSPHATE 8 MG: 4 MG/ML VIAL (ML) INJECTION at 14:05:00

## 2020-12-02 RX ADMIN — BUPRENORPHINE HYDROCHLORIDE 150 MCG: 0.32 INJECTION INTRAMUSCULAR; INTRAVENOUS at 13:58:00

## 2020-12-02 RX ADMIN — CLONIDINE 50 MCG: 100 INJECTION, SOLUTION EPIDURAL at 13:58:00

## 2020-12-02 RX ADMIN — SODIUM CHLORIDE, SODIUM LACTATE, POTASSIUM CHLORIDE, CALCIUM CHLORIDE: 600; 310; 30; 20 INJECTION, SOLUTION INTRAVENOUS at 15:29:00

## 2020-12-02 RX ADMIN — ONDANSETRON 4 MG: 2 INJECTION INTRAMUSCULAR; INTRAVENOUS at 14:05:00

## 2020-12-02 RX ADMIN — DEXAMETHASONE SODIUM PHOSPHATE 2 MG: 10 INJECTION, SOLUTION INTRAMUSCULAR; INTRAVENOUS at 13:58:00

## 2020-12-02 RX ADMIN — SODIUM CHLORIDE, SODIUM LACTATE, POTASSIUM CHLORIDE, CALCIUM CHLORIDE: 600; 310; 30; 20 INJECTION, SOLUTION INTRAVENOUS at 14:38:00

## 2020-12-02 RX ADMIN — TRANEXAMIC ACID 1000 MG: 10 INJECTION, SOLUTION INTRAVENOUS at 14:05:00

## 2020-12-02 NOTE — ADDENDUM NOTE
Addendum  created 12/02/20 5675 by Arnav Choi MD    Child order released for a procedure order, Clinical Note Signed, Intraprocedure Blocks edited

## 2020-12-02 NOTE — INTERVAL H&P NOTE
Pre-op Diagnosis: Primary osteoarthritis of left knee [M17.12]    The above referenced H&P was reviewed by Wero Jackson MD on 12/2/2020, the patient was examined and no significant changes have occurred in the patient's condition since the H&P was perf

## 2020-12-02 NOTE — ANESTHESIA PROCEDURE NOTES
Spinal Block  Performed by: Kim Fernandez MD  Authorized by: Kim Fernandez MD       General Information and Staff    Start Time:   Anesthesiologist: Kim Fernandez MD  Performed by:   Anesthesiologist  Site identification: surface landmarks    Pr

## 2020-12-02 NOTE — OPERATIVE REPORT
DATE OF PROCEDURE:  12/2/2020  PREOPERATIVE DIAGNOSIS: Left knee osteoarthritis. POSTOPERATIVE DIAGNOSIS: Left knee osteoarthritis. PROCEDURE PERFORMED: Cemented left total knee arthroplasty. SURGEON:  Hillary Tejada M.D.   FIRST ASSISTANT:  Zafar Maldonado extension, good tracking of the patella. Drill holes were made in the distal femoral condyles in the trial template. Trial components were removed. Bony surfaces were irrigated and dried.  Final components were precoated with cement which had been mixed und

## 2020-12-02 NOTE — ANESTHESIA PREPROCEDURE EVALUATION
PRE-OP EVALUATION    Patient Name: Toy Turner    Pre-op Diagnosis: Primary osteoarthritis of left knee [M17.12]    Procedure(s):  LEFT TOTAL KNEE ARTHROPLASTY     Surgeon(s) and Role:     Kelly Forde MD - Primary    Pre-op vitals reviewed.   Te Tab, Take 1 tablet (40 mg total) by mouth daily. , Disp: 90 tablet, Rfl: 0, 12/1/2020 at 0800    •  Sertraline HCl 100 MG Oral Tab, Take 1 tablet (100 mg total) by mouth daily. , Disp: 90 tablet, Rfl: 0, 12/1/2020 at 0800    •  atorvastatin 40 MG Oral Tab, T Value Date    INR 0.89 11/21/2020         Airway      Mallampati: II  Mouth opening: >3 FB  TM distance: > 6 cm  Neck ROM: full Cardiovascular    Cardiovascular exam normal.         Dental    No notable dental history.          Pulmonary    Pulmonary exam n

## 2020-12-02 NOTE — ANESTHESIA PROCEDURE NOTES
Regional Block  Performed by: Viktoriya Rooney MD  Authorized by: Viktoriya Rooney MD       General Information and Staff    Start Time:   Anesthesiologist: Viktoriya Rooney MD  Patient Location:  OR      Site Identification: real time ultrasound guided

## 2020-12-02 NOTE — ANESTHESIA PROCEDURE NOTES
Regional Block  Performed by: Anna Becker MD  Authorized by: Anna Becker MD       General Information and Staff    Start Time:  12/2/2020 4:05 PM  Anesthesiologist:  Anna Becker MD  Performed by:   Anesthesiologist  Patient Location:  OR

## 2020-12-02 NOTE — ANESTHESIA POSTPROCEDURE EVALUATION
[address] Patient Status:  Outpatient in a Bed   Age/Gender 64year old female MRN [id number]   Location [facility] Attending Farshad Mckeon MD   Hosp Day # 0 PCP Lamont rL MD       Anesthesia P

## 2020-12-03 ENCOUNTER — APPOINTMENT (OUTPATIENT)
Dept: CV DIAGNOSTICS | Facility: HOSPITAL | Age: 60
End: 2020-12-03
Attending: HOSPITALIST
Payer: COMMERCIAL

## 2020-12-03 VITALS
DIASTOLIC BLOOD PRESSURE: 51 MMHG | BODY MASS INDEX: 40 KG/M2 | OXYGEN SATURATION: 93 % | HEART RATE: 62 BPM | WEIGHT: 248.88 LBS | HEIGHT: 66 IN | TEMPERATURE: 98 F | SYSTOLIC BLOOD PRESSURE: 131 MMHG | RESPIRATION RATE: 20 BRPM

## 2020-12-03 PROCEDURE — 93306 TTE W/DOPPLER COMPLETE: CPT | Performed by: HOSPITALIST

## 2020-12-03 PROCEDURE — 99213 OFFICE O/P EST LOW 20 MIN: CPT | Performed by: INTERNAL MEDICINE

## 2020-12-03 RX ORDER — HYDROCODONE BITARTRATE AND ACETAMINOPHEN 10; 325 MG/1; MG/1
2 TABLET ORAL EVERY 6 HOURS PRN
Status: DISCONTINUED | OUTPATIENT
Start: 2020-12-03 | End: 2020-12-03

## 2020-12-03 RX ORDER — HYDROCODONE BITARTRATE AND ACETAMINOPHEN 10; 325 MG/1; MG/1
1 TABLET ORAL EVERY 6 HOURS PRN
Status: DISCONTINUED | OUTPATIENT
Start: 2020-12-03 | End: 2020-12-03

## 2020-12-03 NOTE — CM/SW NOTE
AVS sent to [facility].        12/03/20 1200   Discharge disposition   Expected discharge disposition Home-Health   Name of Facillity/Home Care/Hospice   ([facility] )   Discharge transportation Private car

## 2020-12-03 NOTE — PHYSICAL THERAPY NOTE
PHYSICAL THERAPY QUICK EVALUATION - INPATIENT    Room Number: 363/363-A  Evaluation Date: 12/3/2020  Presenting Problem: s/p left TKA 12/2/20  Physician Order: PT Eval and Treat    Problem List  Active Problems:    Osteoarthritis    Murmur      Past Medi through this so I know what to do\"    OBJECTIVE  Precautions: None  Fall Risk: Standard fall risk    WEIGHT BEARING RESTRICTION  Weight Bearing Restriction: L lower extremity           L Lower Extremity: Weight Bearing as Tolerated    PAIN ASSESSMENT dizziness which passed quickly. Pt provided verbal instruction and demonstration of rw use, stood to rw with no assist.  Pt able to perform left tke, marching in place with no left knee buckling, instructed in gait sequencing.   Pt amb with rw with gait tr on this evaluation, patient's clinical presentation is stable and overall evaluation complexity is considered low.   PT Discharge Recommendations: Home with home health PT    PLAN  Patient has been evaluated and presents with no skilled Physical Therapy nee

## 2020-12-03 NOTE — PROGRESS NOTES
[address] Patient Status:  Outpatient in a Bed    3/23/1960 MRN [id number]   [facility] 3SW-A Attending Annalee Stover MD   Hosp Day # 0 PCP Arie Mackey MD     Subjective:  S/P LEFT Total Knee Arthroplasty  Sys

## 2020-12-03 NOTE — PROGRESS NOTES
Post Op Day 1 Ortho Note    Status Post Nerve Block:  Type of Nerve Block: Left IPACK and adductor canal  Single Injection Nerve Block    Post op review: No evidence of immediate block related complications, No paresthesia noted, Able to lift leg(s), Able

## 2020-12-03 NOTE — PROGRESS NOTES
[facility]  Progress Note    Chidi Pritchett Patient Status:  Outpatient in a Bed    3/23/1960 MRN [id number]   [facility] 3SW-A Attending Mirian Decker MD   Hosp Day # 0 PCP Lizbeth Linares MD     CC: Medical management    [facility] 12/03/2020    GLU 95 12/03/2020    CA 8.0 12/03/2020           Meds:     •  lactated ringers infusion, , Intravenous, Continuous    •  scopolamine (TRANSDERM-SCOP) patch, 1 patch, Transdermal, Once    •  acetaminophen (TYLENOL EXTRA STRENGTH) tab 1,000 mg, ringers infusion, , Intravenous, Continuous    •  aspirin tab 325 mg, 325 mg, Oral, BID    •  zolpidem (AMBIEN) tab 5 mg, 5 mg, Oral, Nightly PRN    •  atorvastatin (LIPITOR) tab 40 mg, 40 mg, Oral, Nightly    •  Metoprolol Succinate ER (Toprol XL) 24 hr t

## 2020-12-03 NOTE — PLAN OF CARE
Patient alert and oriented x 4. Vital signs stable. Room air. DTV. Last BM 12/1. Incision covered with ace wrap - C/D/I. Patient is WBAT. Ice gel kept in place. Safety precautions in place.  Educated on use of incentive spirometer and performing ankle pumps

## 2020-12-03 NOTE — PLAN OF CARE
Left knee with ace wrap dressing CDI. Verbalized has minimal pain to left knee surgical site. PT/OT seen patient, able to follow joint exercises, able to ambulate using the walker. Dr. Caro  rounded and also cleared patient for discharge.   Anticipati

## 2020-12-03 NOTE — OCCUPATIONAL THERAPY NOTE
OCCUPATIONAL THERAPY QUICK EVALUATION - INPATIENT    Room Number: 363/363-A  Evaluation Date: 12/3/2020     Type of Evaluation: Quick Eval  Presenting Problem: s/p L TKA 12/2/20    Physician Order: IP Consult to Occupational Therapy  Reason for Therapy:  A One level  Lives With: Daughter(grandson and dtr upstairs apt)    Toilet and Equipment: Standard height toilet;3-in-1 commode  Shower/Tub and Equipment: Tub-shower combo(getting transfer bench)  Other Equipment: None    Occupation/Status: works at [employer]    Skilled Therapy Provided:  Activities performed this session include: Education on knee/surgical precautions and incorporation into ADLs; patient required minimal cueing to follow throughout session; education on bed mobility, performed Mod I Complexity  LOW     OT Discharge Recommendations: Home(with family assist)  OT Device Recommendations: None    PLAN   Patient has been evaluated and presents with no skilled Occupational Therapy needs at this time.   Patient discharged from Occupational The

## 2020-12-03 NOTE — PROGRESS NOTES
NURSING DISCHARGE NOTE    Discharged Home via Wheelchair. Accompanied by Family member  Belongings Taken by patient/family. Script for Ltanya Gavin and Aspirin given to patient.

## 2020-12-03 NOTE — PLAN OF CARE
Patient admitted via bed from PACU. Oriented to room. Safety precautions initiated. Call light in reach. Pain controlled. Reports numbness to LLE d/t nerve block. Ace wrap and gel ice to left knee. VSS on RA. IS encouraged. SCDs on bilaterally. DTV.  Fall p

## 2020-12-03 NOTE — CONSULTS
[facility]  [address] Patient Status:  Outpatient in a Bed    3/23/1960 MRN [id number]   [facility] 3SW-A Attending Derick Esparza MD   Hosp Day # 0 PCP Chaparrita Jennings MD     Reason for consult: Hypertension Social History:  reports that she quit smoking about 9 years ago. She has a 30.00 pack-year smoking history. She has never used smokeless tobacco. She reports that she does not drink alcohol or use drugs.     Family History:   Family History   Problem (36.6 °C) (Oral)   Resp 14   Ht 5' 6\" (1.676 m)   Wt 248 lb 14.4 oz (112.9 kg)   LMP 11/05/2010   SpO2 99%   BMI 40.17 kg/m²   General: No acute distress. Alert and oriented x 3. HEENT: Normocephalic atraumatic.   Respiratory: Clear to auscultation bilate

## 2020-12-03 NOTE — CM/SW NOTE
Kay from Aisha with [facility]. They are able to accept pt but want her to be informed under her insurance she will be responsible for a $30 copay per visit. F/U with pt. She is aware and is fine with copay.  She is requesting same staff,

## 2020-12-03 NOTE — CM/SW NOTE
63 yo sp total knee arthroplasty. Post op protocol orders for discharge planning. PT is recommending home health. [facility] not able to service pts address. HX of using [facility].      HOME SITUATION  Type of Home: [place]

## 2020-12-03 NOTE — PLAN OF CARE
Problem: Impaired Activities of Daily Living  Goal: Achieve highest/safest level of independence in self care  Description: Interventions:  - Assess ability and encourage patient to participate in ADLs to maximize function  - Promote sitting position [facility]

## 2020-12-09 RX ORDER — HYDROCODONE BITARTRATE AND ACETAMINOPHEN 10; 325 MG/1; MG/1
1-2 TABLET ORAL EVERY 4 HOURS PRN
Qty: 60 TABLET | Refills: 0 | Status: SHIPPED | OUTPATIENT
Start: 2020-12-09 | End: 2021-01-11

## 2020-12-10 ENCOUNTER — MED REC SCAN ONLY (OUTPATIENT)
Dept: FAMILY MEDICINE CLINIC | Facility: CLINIC | Age: 60
End: 2020-12-10

## 2020-12-10 ENCOUNTER — OFFICE VISIT (OUTPATIENT)
Dept: FAMILY MEDICINE CLINIC | Facility: CLINIC | Age: 60
End: 2020-12-10
Payer: COMMERCIAL

## 2020-12-10 VITALS
HEIGHT: 66 IN | HEART RATE: 89 BPM | BODY MASS INDEX: 41.78 KG/M2 | SYSTOLIC BLOOD PRESSURE: 132 MMHG | OXYGEN SATURATION: 97 % | DIASTOLIC BLOOD PRESSURE: 70 MMHG | WEIGHT: 260 LBS | TEMPERATURE: 98 F | RESPIRATION RATE: 16 BRPM

## 2020-12-10 DIAGNOSIS — M25.562 ACUTE PAIN OF LEFT KNEE: Primary | ICD-10-CM

## 2020-12-10 PROCEDURE — 1111F DSCHRG MED/CURRENT MED MERGE: CPT | Performed by: INTERNAL MEDICINE

## 2020-12-10 PROCEDURE — 3008F BODY MASS INDEX DOCD: CPT | Performed by: INTERNAL MEDICINE

## 2020-12-10 PROCEDURE — 3075F SYST BP GE 130 - 139MM HG: CPT | Performed by: INTERNAL MEDICINE

## 2020-12-10 PROCEDURE — 99214 OFFICE O/P EST MOD 30 MIN: CPT | Performed by: INTERNAL MEDICINE

## 2020-12-10 PROCEDURE — 3078F DIAST BP <80 MM HG: CPT | Performed by: INTERNAL MEDICINE

## 2020-12-10 NOTE — PROGRESS NOTES
Angela uDrand is a 64year old female. HPI:   Pt had total knee replacement 12/02/2020 she is feeling good. She as been working with PT as home, left leg is a swollen and she has a lot of bruising. PAIN 4/10 TAKING NORCO taking 2 every 4 hours.   Has ic History of depression 2016   • Irregular bowel habits 2017   • Loss of appetite Recent   • Nausea 2017   • Osteoarthritis     knees   • Pain in joints June 2019    Right knee replacement   • Painful swallowing Sept. 2019   • Postmenopause 2010   • Problems or ordered in this encounter       Imaging & Consults:  None    Follow up as needed. The patient indicates understanding of these issues and agrees to the plan.

## 2020-12-18 ENCOUNTER — ORDER TRANSCRIPTION (OUTPATIENT)
Dept: PHYSICAL THERAPY | Facility: HOSPITAL | Age: 60
End: 2020-12-18

## 2020-12-18 DIAGNOSIS — M17.12 PRIMARY OSTEOARTHRITIS OF LEFT KNEE: ICD-10-CM

## 2020-12-18 DIAGNOSIS — Z96.652 STATUS POST LEFT PARTIAL KNEE REPLACEMENT: Primary | ICD-10-CM

## 2020-12-22 ENCOUNTER — MED REC SCAN ONLY (OUTPATIENT)
Dept: FAMILY MEDICINE CLINIC | Facility: CLINIC | Age: 60
End: 2020-12-22

## 2020-12-23 ENCOUNTER — MED REC SCAN ONLY (OUTPATIENT)
Dept: FAMILY MEDICINE CLINIC | Facility: CLINIC | Age: 60
End: 2020-12-23

## 2020-12-29 ENCOUNTER — OFFICE VISIT (OUTPATIENT)
Dept: PHYSICAL THERAPY | Age: 60
End: 2020-12-29
Attending: ORTHOPAEDIC SURGERY
Payer: COMMERCIAL

## 2020-12-29 DIAGNOSIS — M17.12 PRIMARY OSTEOARTHRITIS OF LEFT KNEE: ICD-10-CM

## 2020-12-29 DIAGNOSIS — Z96.652 STATUS POST LEFT PARTIAL KNEE REPLACEMENT: ICD-10-CM

## 2020-12-29 PROCEDURE — 97110 THERAPEUTIC EXERCISES: CPT

## 2020-12-29 PROCEDURE — 97162 PT EVAL MOD COMPLEX 30 MIN: CPT

## 2020-12-29 NOTE — PROGRESS NOTES
POST-OP KNEE EVALUATION:   Referring Physician: Dr. Antonio Miller  Diagnosis: S/P L TKR 12/2/20     Date of Service: 12/29/2020     PATIENT SUMMARY   Evens Aquino is a 64year old female who presents to therapy today s/p L partial knee replacement on 12/2/ AROM:    Knee    Flexion: R 118; L 82   Extension: R -5; L -15     PROM:    Knee    Flexion: R 120; L 92   Extension: R -5; L -10     Patellar Mobility/Accessory motion: Fair patella mobilty    Flexibility:   Hamstrings: R Moderate tightness; L Moderat strength to 5/5 to perform stepping and squatting activities without excessive femoral IR/ADD  · Pt will improve SLS to >20s to improve safety and independence with gait on uneven surfaces such as grass  · Pt will be independent and compliant with comprehe

## 2020-12-31 ENCOUNTER — OFFICE VISIT (OUTPATIENT)
Dept: PHYSICAL THERAPY | Age: 60
End: 2020-12-31
Attending: ORTHOPAEDIC SURGERY
Payer: COMMERCIAL

## 2020-12-31 DIAGNOSIS — M17.12 PRIMARY OSTEOARTHRITIS OF LEFT KNEE: ICD-10-CM

## 2020-12-31 DIAGNOSIS — Z96.652 STATUS POST LEFT PARTIAL KNEE REPLACEMENT: ICD-10-CM

## 2020-12-31 PROCEDURE — 97140 MANUAL THERAPY 1/> REGIONS: CPT

## 2020-12-31 PROCEDURE — 97110 THERAPEUTIC EXERCISES: CPT

## 2020-12-31 NOTE — PROGRESS NOTES
Dx: L TKR 12/2/2020         Insurance (Authorized # of Visits):  Med necessity           Authorizing Physician: Dr. Caro   Next MD visit: none scheduled  Fall Risk: standard         Precautions: n/a             Subjective: Knee feeling pretty good overa slr    Charges: man therapy, therapeutic ex 2       Total Timed Treatment: 40 min  Total Treatment Time: 40 min

## 2021-01-05 ENCOUNTER — TELEPHONE (OUTPATIENT)
Dept: PHYSICAL THERAPY | Facility: HOSPITAL | Age: 61
End: 2021-01-05

## 2021-01-05 ENCOUNTER — APPOINTMENT (OUTPATIENT)
Dept: PHYSICAL THERAPY | Age: 61
End: 2021-01-05
Attending: ORTHOPAEDIC SURGERY
Payer: COMMERCIAL

## 2021-01-08 ENCOUNTER — APPOINTMENT (OUTPATIENT)
Dept: PHYSICAL THERAPY | Age: 61
End: 2021-01-08
Attending: ORTHOPAEDIC SURGERY
Payer: COMMERCIAL

## 2021-01-08 ENCOUNTER — TELEPHONE (OUTPATIENT)
Dept: PHYSICAL THERAPY | Facility: HOSPITAL | Age: 61
End: 2021-01-08

## 2021-01-11 ENCOUNTER — TELEPHONE (OUTPATIENT)
Dept: FAMILY MEDICINE CLINIC | Facility: CLINIC | Age: 61
End: 2021-01-11

## 2021-01-11 NOTE — TELEPHONE ENCOUNTER
Call patient regarding her sleep consult. States that she still her narcotics following her knee surgery. When she is off the pain medicine she will call back to schedule an appointment.

## 2021-01-12 ENCOUNTER — OFFICE VISIT (OUTPATIENT)
Dept: PHYSICAL THERAPY | Age: 61
End: 2021-01-12
Attending: ORTHOPAEDIC SURGERY
Payer: COMMERCIAL

## 2021-01-12 DIAGNOSIS — Z96.652 STATUS POST LEFT PARTIAL KNEE REPLACEMENT: ICD-10-CM

## 2021-01-12 DIAGNOSIS — M17.12 PRIMARY OSTEOARTHRITIS OF LEFT KNEE: ICD-10-CM

## 2021-01-12 PROCEDURE — 97140 MANUAL THERAPY 1/> REGIONS: CPT

## 2021-01-12 PROCEDURE — 97110 THERAPEUTIC EXERCISES: CPT

## 2021-01-12 RX ORDER — HYDROCODONE BITARTRATE AND ACETAMINOPHEN 10; 325 MG/1; MG/1
1-2 TABLET ORAL EVERY 4 HOURS PRN
Qty: 60 TABLET | Refills: 0 | OUTPATIENT
Start: 2021-01-12

## 2021-01-12 RX ORDER — HYDROCODONE BITARTRATE AND ACETAMINOPHEN 10; 325 MG/1; MG/1
1-2 TABLET ORAL EVERY 4 HOURS PRN
Qty: 60 TABLET | Refills: 0 | Status: SHIPPED | OUTPATIENT
Start: 2021-01-12 | End: 2021-02-03

## 2021-01-12 NOTE — PROGRESS NOTES
Dx: L TKR 12/2/2020         Insurance (Authorized # of Visits):  Med necessity           Authorizing Physician: Dr. Jarrett Enriquez  Next MD visit: none scheduled  Fall Risk: standard         Precautions: n/a             Subjective: Knee feeling better, less swel THERAPY  Patella mobs grade 3 4'   MANUAL THERAPY  Patella mobs grade 3 4'                    HEP: saq, quad set, slr    Charges: man therapy, therapeutic ex 2       Total Timed Treatment: 40 min  Total Treatment Time: 40 min

## 2021-01-14 ENCOUNTER — OFFICE VISIT (OUTPATIENT)
Dept: PHYSICAL THERAPY | Age: 61
End: 2021-01-14
Attending: ORTHOPAEDIC SURGERY
Payer: COMMERCIAL

## 2021-01-14 DIAGNOSIS — Z96.652 STATUS POST TOTAL LEFT KNEE REPLACEMENT: ICD-10-CM

## 2021-01-14 DIAGNOSIS — M17.12 PRIMARY OSTEOARTHRITIS OF LEFT KNEE: ICD-10-CM

## 2021-01-14 PROCEDURE — 97140 MANUAL THERAPY 1/> REGIONS: CPT

## 2021-01-14 PROCEDURE — 97110 THERAPEUTIC EXERCISES: CPT

## 2021-01-14 NOTE — PROGRESS NOTES
Dx: L TKR 12/2/2020         Insurance (Authorized # of Visits):  Med necessity           Authorizing Physician: Dr. Vinh Bell  Next MD visit: none scheduled  Fall Risk: standard         Precautions: n/a             Subjective: Knee continues to feel better, squat 62 3x10  Lateral stepping 8'x6 THERAPEUTIC EX  Nu-step L3 8'  Slant board L2 1'x3  gastroc stretch 3x45\"  Hamstring stretch 3x45\"  Knee ROM  Clamshells 3x10  SAQ 2# 3x10  Shuttle squat 62 3x10  Lateral stepping 8'x6     MANUAL THERAPY  Patella mobs

## 2021-01-19 ENCOUNTER — TELEPHONE (OUTPATIENT)
Dept: PHYSICAL THERAPY | Facility: HOSPITAL | Age: 61
End: 2021-01-19

## 2021-01-19 ENCOUNTER — APPOINTMENT (OUTPATIENT)
Dept: PHYSICAL THERAPY | Age: 61
End: 2021-01-19
Attending: ORTHOPAEDIC SURGERY
Payer: COMMERCIAL

## 2021-01-20 RX ORDER — ATORVASTATIN CALCIUM 40 MG/1
40 TABLET, FILM COATED ORAL NIGHTLY
Qty: 90 TABLET | Refills: 3 | Status: SHIPPED | OUTPATIENT
Start: 2021-01-20

## 2021-01-20 NOTE — TELEPHONE ENCOUNTER
Last OV: 12/10/20  Last refill: 9/9/19 #90 Tablets w/ 3 refills  Labs: LIPID on 6/30/20  Requested Prescriptions     Pending Prescriptions Disp Refills   • atorvastatin 40 MG Oral Tab 90 tablet 3     Sig: Take 1 tablet (40 mg total) by mouth nightly.      Isaak Gilman Yes

## 2021-01-22 ENCOUNTER — OFFICE VISIT (OUTPATIENT)
Dept: PHYSICAL THERAPY | Age: 61
End: 2021-01-22
Attending: ORTHOPAEDIC SURGERY
Payer: COMMERCIAL

## 2021-01-22 DIAGNOSIS — M17.12 PRIMARY OSTEOARTHRITIS OF LEFT KNEE: ICD-10-CM

## 2021-01-22 DIAGNOSIS — Z96.652 STATUS POST LEFT PARTIAL KNEE REPLACEMENT: ICD-10-CM

## 2021-01-22 PROCEDURE — 97140 MANUAL THERAPY 1/> REGIONS: CPT

## 2021-01-22 PROCEDURE — 97110 THERAPEUTIC EXERCISES: CPT

## 2021-01-22 NOTE — PROGRESS NOTES
Dx: L TKR 12/2/2020         Insurance (Authorized # of Visits):  Med necessity           Authorizing Physician: Dr. Richard Varela MD visit: none scheduled  Fall Risk: standard         Precautions: n/a             Subjective: Knee feels stiff, but no pain. 8'x6 THERAPEUTIC EX  Nu-step L3 8'  Slant board L2 1'x3  gastroc stretch 3x45\"  Hamstring stretch 3x45\"  Knee ROM  Clamshells 3x10  SAQ 2# 3x10  Shuttle squat 62 3x10  Lateral stepping 8'x6 THERAPEUTIC EX  Nu-step L3 8'  Slant board L2 1'x3  gastroc stre

## 2021-01-25 ENCOUNTER — APPOINTMENT (OUTPATIENT)
Dept: PHYSICAL THERAPY | Age: 61
End: 2021-01-25
Attending: ORTHOPAEDIC SURGERY
Payer: COMMERCIAL

## 2021-01-25 ENCOUNTER — TELEPHONE (OUTPATIENT)
Dept: PHYSICAL THERAPY | Facility: HOSPITAL | Age: 61
End: 2021-01-25

## 2021-01-26 ENCOUNTER — APPOINTMENT (OUTPATIENT)
Dept: PHYSICAL THERAPY | Age: 61
End: 2021-01-26
Attending: ORTHOPAEDIC SURGERY
Payer: COMMERCIAL

## 2021-01-27 ENCOUNTER — OFFICE VISIT (OUTPATIENT)
Dept: PHYSICAL THERAPY | Age: 61
End: 2021-01-27
Attending: ORTHOPAEDIC SURGERY
Payer: COMMERCIAL

## 2021-01-27 PROCEDURE — 97110 THERAPEUTIC EXERCISES: CPT

## 2021-01-27 PROCEDURE — 97140 MANUAL THERAPY 1/> REGIONS: CPT

## 2021-01-27 NOTE — PROGRESS NOTES
Dx: L TKR 12/2/2020         Insurance (Authorized # of Visits):  Med necessity           Authorizing Physician: Dr. Kevin Bedoya  Next MD visit: none scheduled  Fall Risk: standard         Precautions: n/a             Subjective: MD happy with progress, will g 3x10  SAQ 2# 3x10  Shuttle squat 62 3x10  Lateral stepping 8'x6 THERAPEUTIC EX  Nu-step L3 8'  Slant board L2 1'x3  gastroc stretch 3x45\"  Hamstring stretch 3x45\"  Knee ROM  Clamshells 3x10  SAQ 2# 3x10  Shuttle squat 62 3x10  Lateral stepping 8'x6 THERA

## 2021-01-29 ENCOUNTER — APPOINTMENT (OUTPATIENT)
Dept: PHYSICAL THERAPY | Age: 61
End: 2021-01-29
Attending: ORTHOPAEDIC SURGERY
Payer: COMMERCIAL

## 2021-02-01 ENCOUNTER — OFFICE VISIT (OUTPATIENT)
Dept: PHYSICAL THERAPY | Age: 61
End: 2021-02-01
Attending: ORTHOPAEDIC SURGERY
Payer: COMMERCIAL

## 2021-02-01 PROCEDURE — 97110 THERAPEUTIC EXERCISES: CPT

## 2021-02-01 PROCEDURE — 97140 MANUAL THERAPY 1/> REGIONS: CPT

## 2021-02-01 NOTE — PROGRESS NOTES
Dx: L TKR 12/2/2020         Insurance (Authorized # of Visits):  Med necessity           Authorizing Physician: Dr. Madonna Hansen  Next MD visit: none scheduled  Fall Risk: standard         Precautions: n/a             Subjective: Feeling pretty good, no c/o.  H squat 62 3x10  Lateral stepping 8'x6 THERAPEUTIC EX  Nu-step L3 8'  Slant board L2 1'x3  gastroc stretch 3x45\"  Hamstring stretch 3x45\"  Knee ROM  Clamshells 3x10  SAQ 2# 3x10  Shuttle squat 62 3x10  Lateral stepping 8'x6 THERAPEUTIC EX  Nu-step L3 8'  S

## 2021-02-03 RX ORDER — LISINOPRIL 40 MG/1
40 TABLET ORAL DAILY
Qty: 90 TABLET | Refills: 0 | Status: SHIPPED | OUTPATIENT
Start: 2021-02-03 | End: 2021-05-25

## 2021-02-03 NOTE — TELEPHONE ENCOUNTER
Last office visit: 11/21/20  Last refill: 10/15/20  Labs up to date  Future Appointments   Date Time Provider Manisha Osullivan   2/4/2021  1:15 PM SHIRLENE Pop   2/11/2021 12:15 PM SHIRLENE Pop   2/15/2021  1:15 PM

## 2021-02-04 ENCOUNTER — TELEPHONE (OUTPATIENT)
Dept: PHYSICAL THERAPY | Facility: HOSPITAL | Age: 61
End: 2021-02-04

## 2021-02-04 ENCOUNTER — APPOINTMENT (OUTPATIENT)
Dept: PHYSICAL THERAPY | Age: 61
End: 2021-02-04
Attending: ORTHOPAEDIC SURGERY
Payer: COMMERCIAL

## 2021-02-04 RX ORDER — SERTRALINE HYDROCHLORIDE 100 MG/1
100 TABLET, FILM COATED ORAL DAILY
Qty: 90 TABLET | Refills: 3 | Status: SHIPPED | OUTPATIENT
Start: 2021-02-04 | End: 2021-05-05

## 2021-02-04 RX ORDER — HYDROCODONE BITARTRATE AND ACETAMINOPHEN 10; 325 MG/1; MG/1
1-2 TABLET ORAL EVERY 4 HOURS PRN
Qty: 60 TABLET | Refills: 0 | Status: SHIPPED | OUTPATIENT
Start: 2021-02-04 | End: 2021-06-17 | Stop reason: ALTCHOICE

## 2021-02-04 NOTE — TELEPHONE ENCOUNTER
Last refill #90 on 10/15/2020  Last office visit pertaining to refill on 11/21/2020  Future Appointments   Date Time Provider Manisha Osullivan   2/4/2021  1:15 PM SHIRLENE Dhaliwal   2/11/2021 12:15 PM SHIRLENE Dhaliwal   2/1

## 2021-02-11 ENCOUNTER — APPOINTMENT (OUTPATIENT)
Dept: PHYSICAL THERAPY | Age: 61
End: 2021-02-11
Attending: ORTHOPAEDIC SURGERY
Payer: COMMERCIAL

## 2021-02-11 DIAGNOSIS — Z23 NEED FOR VACCINATION: ICD-10-CM

## 2021-02-15 ENCOUNTER — OFFICE VISIT (OUTPATIENT)
Dept: PHYSICAL THERAPY | Age: 61
End: 2021-02-15
Attending: ORTHOPAEDIC SURGERY
Payer: COMMERCIAL

## 2021-02-15 PROCEDURE — 97140 MANUAL THERAPY 1/> REGIONS: CPT

## 2021-02-15 PROCEDURE — 97110 THERAPEUTIC EXERCISES: CPT

## 2021-02-15 NOTE — PROGRESS NOTES
Dx: L TKR 12/2/2020         Insurance (Authorized # of Visits):  Med necessity           Authorizing Physician: Dr. Kylee Magallanes  Next MD visit: none scheduled  Fall Risk: standard         Precautions: n/a             Subjective: Didn't feel comfortable with r ROM  Clamshells 3x10  SAQ 2# 3x10  Shuttle squat 62 3x10  Lateral stepping 8'x6 THERAPEUTIC EX  Nu-step L3 8'  Slant board L2 1'x3  gastroc stretch 3x45\"  Hamstring stretch 3x45\"  Knee ROM  Clamshells 3x10  SAQ 4# 3x10  Shuttle squat 62 3x10  Lateral miguelangel

## 2021-02-18 ENCOUNTER — OFFICE VISIT (OUTPATIENT)
Dept: PHYSICAL THERAPY | Age: 61
End: 2021-02-18
Attending: ORTHOPAEDIC SURGERY
Payer: COMMERCIAL

## 2021-02-18 PROCEDURE — 97110 THERAPEUTIC EXERCISES: CPT

## 2021-02-18 PROCEDURE — 97140 MANUAL THERAPY 1/> REGIONS: CPT

## 2021-02-18 NOTE — PROGRESS NOTES
Dx: L TKR 12/2/2020         Insurance (Authorized # of Visits):  Med necessity           Authorizing Physician: Dr. Moe Byrne  Next MD visit: none scheduled  Fall Risk: standard         Precautions: n/a             Subjective: Knee continues to feel stronge 3x10  Lateral stepping 8'x6   THERAPEUTIC EX  Nu-step L3 8'  Slant board L2 1'x3  gastroc stretch 3x45\"  Hamstring stretch 3x45\"  Knee ROM  Clamshells 3x10  SAQ 4# 3x10  Shuttle squat 62 3x10  Lateral stepping red 8'x6  Lateral step ups 4\" 3x10  Step ov

## 2021-02-23 ENCOUNTER — OFFICE VISIT (OUTPATIENT)
Dept: PHYSICAL THERAPY | Age: 61
End: 2021-02-23
Attending: ORTHOPAEDIC SURGERY
Payer: COMMERCIAL

## 2021-02-23 PROCEDURE — 97110 THERAPEUTIC EXERCISES: CPT

## 2021-02-23 NOTE — PROGRESS NOTES
Progress Summary  Pt has attended 10 visits in Physical Therapy.    Dx: L TKR 12/2/2020         Insurance (Authorized # of Visits):  Med necessity           Authorizing Physician: Dr. Von Costa  Next MD visit: none scheduled  Fall Risk: standard         Pre was advised of these findings, precautions, and treatment options and has agreed to actively participate in planning and for this course of care. Thank you for your referral. If you have any questions, please contact me at Dept: [phone].     Sincere 8'x6  Lateral step ups 4\" 3x10  Step overs 4\" 3x10 THERAPEUTIC EX  Nu-step L3 8'  Slant board L2 1'x3  gastroc stretch 3x45\"  Hamstring stretch 3x45\"  Knee ROM  Clamshells 3x10  SAQ 6# 3x10  Shuttle squat 81 3x10  Lateral stepping grn 8'x6  Lateral miguelangel

## 2021-02-26 ENCOUNTER — OFFICE VISIT (OUTPATIENT)
Dept: PHYSICAL THERAPY | Age: 61
End: 2021-02-26
Attending: ORTHOPAEDIC SURGERY
Payer: COMMERCIAL

## 2021-02-26 PROCEDURE — 97110 THERAPEUTIC EXERCISES: CPT

## 2021-02-26 PROCEDURE — 97140 MANUAL THERAPY 1/> REGIONS: CPT

## 2021-02-26 NOTE — PROGRESS NOTES
Dx: L TKR 12/2/2020         Insurance (Authorized # of Visits):  Med necessity           Authorizing Physician: Dr. Yasmin Varela MD visit: none scheduled  Fall Risk: standard         Precautions: n/a             Subjective: Knee continues to feel stro 3x45\"  Hamstring stretch 3x45\"  Knee ROM  Clamshells 3x10  SAQ 4# 3x10  Shuttle squat 62 3x10  Lateral stepping red 8'x6  Lateral step ups 4\" 3x10  Step overs 4\" 3x10 THERAPEUTIC EX  Nu-step L3 8'  Slant board L2 1'x3  gastroc stretch 3x45\"  Hamstring

## 2021-03-01 ENCOUNTER — OFFICE VISIT (OUTPATIENT)
Dept: PHYSICAL THERAPY | Age: 61
End: 2021-03-01
Attending: ORTHOPAEDIC SURGERY
Payer: COMMERCIAL

## 2021-03-01 PROCEDURE — 97110 THERAPEUTIC EXERCISES: CPT

## 2021-03-01 PROCEDURE — 97140 MANUAL THERAPY 1/> REGIONS: CPT

## 2021-03-02 ENCOUNTER — OFFICE VISIT (OUTPATIENT)
Dept: PHYSICAL THERAPY | Age: 61
End: 2021-03-02
Attending: ORTHOPAEDIC SURGERY
Payer: COMMERCIAL

## 2021-03-02 PROCEDURE — 97110 THERAPEUTIC EXERCISES: CPT

## 2021-03-02 PROCEDURE — 97140 MANUAL THERAPY 1/> REGIONS: CPT

## 2021-03-02 NOTE — PROGRESS NOTES
Dx: L TKR 12/2/2020         Insurance (Authorized # of Visits):  Med necessity           Authorizing Physician: Dr. Nandini Mehta  Next MD visit: none scheduled  Fall Risk: standard         Precautions: n/a             Subjective: Continue to do more around 3x45\"  Hamstring stretch 3x45\"  Knee ROM  Clamshells 3x10  SAQ 6# 3x10  Shuttle squat 75 3x10  Lateral stepping red 8'x6  Lateral step ups 4\" 3x10  Step overs 4\" 3x10 THERAPEUTIC EX  Nu-step L3 8'  Slant board L2 1'x3  gastroc stretch 3x45\"  Hamstring

## 2021-03-02 NOTE — PROGRESS NOTES
Dx: L TKR 12/2/2020         Insurance (Authorized # of Visits):  Med necessity           Authorizing Physician: Dr. Macario Guerra  Next MD visit: none scheduled  Fall Risk: standard         Precautions: n/a             Subjective: Feeling stronger, less pain board L2 1'x3  gastroc stretch 3x45\"  Hamstring stretch 3x45\"  Knee ROM  Clamshells 3x10  SAQ 6# 3x10  Shuttle squat 75 3x10  Lateral stepping red 8'x6  Lateral step ups 4\" 3x10  Step overs 4\" 3x10 THERAPEUTIC EX  Nu-step L3 8'  Slant board L2 1'x3  ga

## 2021-03-04 ENCOUNTER — APPOINTMENT (OUTPATIENT)
Dept: PHYSICAL THERAPY | Age: 61
End: 2021-03-04
Attending: ORTHOPAEDIC SURGERY
Payer: COMMERCIAL

## 2021-03-09 ENCOUNTER — OFFICE VISIT (OUTPATIENT)
Dept: PHYSICAL THERAPY | Age: 61
End: 2021-03-09
Attending: ORTHOPAEDIC SURGERY
Payer: COMMERCIAL

## 2021-03-09 PROCEDURE — 97110 THERAPEUTIC EXERCISES: CPT

## 2021-03-09 NOTE — PROGRESS NOTES
Dx: L TKR 12/2/2020         Insurance (Authorized # of Visits):  Med necessity           Authorizing Physician: Dr. Yasmin Varela MD visit: none scheduled  Fall Risk: standard         Precautions: n/a             Subjective: Knee felt good over weekend 3x45\"  Hamstring stretch 3x45\"  Knee ROM  Clamshells 3x10  SAQ 6# 3x10  Shuttle squat 81 3x10  Lateral stepping grn 8'x6  Lateral step ups 4\" 3x10  Step overs 4\" 3x10 THERAPEUTIC EX  Nu-step L3 8'  Slant board L2 1'x3  gastroc stretch 3x45\"  Hamstring

## 2021-03-10 RX ORDER — FUROSEMIDE 40 MG/1
40 TABLET ORAL DAILY
Qty: 90 TABLET | Refills: 0 | Status: SHIPPED | OUTPATIENT
Start: 2021-03-10 | End: 2021-03-24

## 2021-03-10 RX ORDER — METOPROLOL SUCCINATE 50 MG/1
50 TABLET, EXTENDED RELEASE ORAL DAILY
Qty: 90 TABLET | Refills: 3 | Status: SHIPPED | OUTPATIENT
Start: 2021-03-10 | End: 2021-12-13

## 2021-03-10 NOTE — TELEPHONE ENCOUNTER
LOV 12/10/2020    LAST LAB 12/03/2020    LAST RX  Metoprolol #90 R3 11/13/2020  Furosemide #90 R0 11/02/2020    Next OV   Future Appointments   Date Time Provider Manisha Osullivan   3/12/2021  1:15 PM Jim Silva, PT [facility] [place]     [facility]

## 2021-03-12 ENCOUNTER — OFFICE VISIT (OUTPATIENT)
Dept: PHYSICAL THERAPY | Age: 61
End: 2021-03-12
Attending: ORTHOPAEDIC SURGERY
Payer: COMMERCIAL

## 2021-03-12 PROCEDURE — 97110 THERAPEUTIC EXERCISES: CPT

## 2021-03-12 NOTE — PROGRESS NOTES
Discharge Summary  Pt has attended 15 visits in Physical Therapy.    Dx: L TKR 12/2/2020         Insurance (Authorized # of Visits):  Med necessity           Authorizing Physician: Dr. Manjinder Blackwell  Next MD visit: none scheduled  Fall Risk: standard         Pr ROM  Clamshells 3x10  SAQ 6# 3x10  Shuttle squat 75 3x10  Lateral stepping red 8'x6  Lateral step ups 4\" 3x10  Step overs 4\" 3x10 THERAPEUTIC EX  Nu-step L3 8'  Slant board L2 1'x3  gastroc stretch 3x45\"  Hamstring stretch 3x45\"  Knee ROM  Clamshells 3 johnnie busch    Charges: therapeutic ex 3      Total Timed Treatment: 45 min  Total Treatment Time: 45 min

## 2021-03-16 ENCOUNTER — OFFICE VISIT (OUTPATIENT)
Dept: FAMILY MEDICINE CLINIC | Facility: CLINIC | Age: 61
End: 2021-03-16
Payer: COMMERCIAL

## 2021-03-16 VITALS
SYSTOLIC BLOOD PRESSURE: 140 MMHG | HEIGHT: 66 IN | DIASTOLIC BLOOD PRESSURE: 82 MMHG | HEART RATE: 64 BPM | OXYGEN SATURATION: 96 % | WEIGHT: 247 LBS | RESPIRATION RATE: 18 BRPM | BODY MASS INDEX: 39.7 KG/M2 | TEMPERATURE: 97 F

## 2021-03-16 DIAGNOSIS — M79.605 LEFT LEG PAIN: Primary | ICD-10-CM

## 2021-03-16 DIAGNOSIS — I83.892 VARICOSE VEINS OF LEFT LEG WITH EDEMA: ICD-10-CM

## 2021-03-16 PROCEDURE — 99214 OFFICE O/P EST MOD 30 MIN: CPT | Performed by: INTERNAL MEDICINE

## 2021-03-16 PROCEDURE — 3008F BODY MASS INDEX DOCD: CPT | Performed by: INTERNAL MEDICINE

## 2021-03-16 PROCEDURE — 3079F DIAST BP 80-89 MM HG: CPT | Performed by: INTERNAL MEDICINE

## 2021-03-16 PROCEDURE — 3077F SYST BP >= 140 MM HG: CPT | Performed by: INTERNAL MEDICINE

## 2021-03-16 NOTE — PROGRESS NOTES
Brooklyn Quiles is a 64year old female. HPI:   Pt has been back to work one day and had pain in the lower left leg, she had to leave 30 minutes early. She has a second knee replacement and has been off for 4 months.    Current Outpatient Medications   Me Right knee replacement   • Painful swallowing Sept. 2019   • Postmenopause 2010   • Problems with swallowing    • Uncomfortable fullness after meals Sept. 2019   • Visual impairment     glasses   • Vomiting 2017    Rescently has gotten worae   • Wears glas indicates understanding of these issues and agrees to the plan.

## 2021-03-18 ENCOUNTER — PATIENT MESSAGE (OUTPATIENT)
Dept: FAMILY MEDICINE CLINIC | Facility: CLINIC | Age: 61
End: 2021-03-18

## 2021-03-18 NOTE — TELEPHONE ENCOUNTER
LM for patient to call back with questions. There are vein specialists at [facility].

## 2021-03-18 NOTE — TELEPHONE ENCOUNTER
From: Sapna Hamlin  To: Lamont Lr MD  Sent: 3/18/2021 12:53 PM CDT  Subject: Other    Can we make sure I don't have a blood clot? Get an ultrasound sound?

## 2021-03-19 ENCOUNTER — OFFICE VISIT (OUTPATIENT)
Dept: FAMILY MEDICINE CLINIC | Facility: CLINIC | Age: 61
End: 2021-03-19
Payer: COMMERCIAL

## 2021-03-19 VITALS
WEIGHT: 243 LBS | DIASTOLIC BLOOD PRESSURE: 80 MMHG | TEMPERATURE: 98 F | BODY MASS INDEX: 39 KG/M2 | OXYGEN SATURATION: 99 % | RESPIRATION RATE: 17 BRPM | HEART RATE: 64 BPM | SYSTOLIC BLOOD PRESSURE: 136 MMHG

## 2021-03-19 DIAGNOSIS — M79.605 LEFT LEG PAIN: Primary | ICD-10-CM

## 2021-03-19 DIAGNOSIS — Z76.89 ENCOUNTER TO ESTABLISH CARE WITH NEW DOCTOR: ICD-10-CM

## 2021-03-19 DIAGNOSIS — L81.9 DISCOLORATION OF SKIN OF LOWER LEG: ICD-10-CM

## 2021-03-19 PROCEDURE — 99214 OFFICE O/P EST MOD 30 MIN: CPT | Performed by: FAMILY MEDICINE

## 2021-03-19 PROCEDURE — 3075F SYST BP GE 130 - 139MM HG: CPT | Performed by: FAMILY MEDICINE

## 2021-03-19 PROCEDURE — 3079F DIAST BP 80-89 MM HG: CPT | Performed by: FAMILY MEDICINE

## 2021-03-19 NOTE — PROGRESS NOTES
[facility] Family Medicine Office Note  Chief Complaint:   Patient presents with:  Medication Follow-Up: per pt- go over meds  Leg or Foot Injury: left calf, discolored, throbing pain after being on it all day,  sent in US      HPI:   T • Belching 2019   • Bloating 2017   • Blurred vision sept. 2019    Need new glasses   • Change in hair Nails peeling   • Diarrhea, unspecified 2017   • Easy bruising 2016    Bruise and bleed easy   • Fatigue 2017   • Flatulence/gas pain/belching 2019 lymphoma   • Alcohol and Other Disorders Associated Brother         Cocaine   • Diabetes Brother    • Hypertension Brother    • Heart Attack Brother    • Stroke Brother    • Heart Disorder Brother 28   • Other (Other) Brother         stroke     Allergies: of this encounter: 243 lb (110.2 kg). Vital signs reviewed. Appears stated age, well groomed.     Physical Exam     GEN: Not in any acute distress, making good conversation, answering appropriately   SKIN: No pallor, no erythema, no cyanosis, warm and dr notified to call with any questions, complications, allergies, or worsening or changing symptoms. Patient is to call with any side effects or complications from the treatments as a result of today.      Problem List:  Patient Active Problem List:     Terrance Burdick

## 2021-03-24 RX ORDER — FUROSEMIDE 40 MG/1
40 TABLET ORAL DAILY
Qty: 90 TABLET | Refills: 0 | Status: SHIPPED | OUTPATIENT
Start: 2021-03-24 | End: 2021-07-02

## 2021-03-24 NOTE — TELEPHONE ENCOUNTER
Last office visit: 3/16/21  Last refill: 3/10/21  Labs Due: 11/21/20  No future appointments. furosemide 40 MG Oral Tab         Sig: Take 1 tablet (40 mg total) by mouth daily.     Disp:  90 tablet    Refills:  0    Start: 3/24/2021 - 3/19/2022    Class:

## 2021-03-31 ENCOUNTER — OFFICE VISIT (OUTPATIENT)
Dept: FAMILY MEDICINE CLINIC | Facility: CLINIC | Age: 61
End: 2021-03-31
Payer: COMMERCIAL

## 2021-03-31 VITALS
SYSTOLIC BLOOD PRESSURE: 122 MMHG | RESPIRATION RATE: 16 BRPM | OXYGEN SATURATION: 97 % | HEIGHT: 66 IN | WEIGHT: 246.5 LBS | TEMPERATURE: 98 F | HEART RATE: 68 BPM | DIASTOLIC BLOOD PRESSURE: 74 MMHG | BODY MASS INDEX: 39.62 KG/M2

## 2021-03-31 DIAGNOSIS — M21.40 PES PLANUS, UNSPECIFIED LATERALITY: ICD-10-CM

## 2021-03-31 DIAGNOSIS — Z09 FOLLOW-UP EXAM: Primary | ICD-10-CM

## 2021-03-31 DIAGNOSIS — M79.605 LEFT LEG PAIN: ICD-10-CM

## 2021-03-31 PROCEDURE — 3074F SYST BP LT 130 MM HG: CPT | Performed by: FAMILY MEDICINE

## 2021-03-31 PROCEDURE — 99213 OFFICE O/P EST LOW 20 MIN: CPT | Performed by: FAMILY MEDICINE

## 2021-03-31 PROCEDURE — 3078F DIAST BP <80 MM HG: CPT | Performed by: FAMILY MEDICINE

## 2021-03-31 PROCEDURE — 3008F BODY MASS INDEX DOCD: CPT | Performed by: FAMILY MEDICINE

## 2021-03-31 NOTE — PROGRESS NOTES
Calf recheck. Patient needs note for work stating she can only 4 hours a shift. Patient states calf has improved.

## 2021-04-01 NOTE — PROGRESS NOTES
[facility] Family Medicine Office Note  Chief Complaint:   No chief complaint on file.       HPI:   This is a 63year old female coming in for follow up on the L calf strain / leg pain - notes that it is improving and would like to start working b CHOLECYSTECTOMY  6/1/2013   • COLONOSCOPY  11/29/2014   • FOOT/TOES SURGERY PROC UNLISTED     • KNEE REPLACEMENT SURGERY Right 70/9121    no complications   • KNEE TOTAL REPLACEMENT Left 12/2/2020    Performed by Pat Pulliam MD at [address] [place] [address] by mouth daily. 90 tablet 3   • lisinopril 40 MG Oral Tab Take 1 tablet (40 mg total) by mouth daily. 90 tablet 0   • atorvastatin 40 MG Oral Tab Take 1 tablet (40 mg total) by mouth nightly.  90 tablet 3   • Oxybutynin Chloride ER 10 MG Oral Tablet 24 Hr T compression socks)   - 4 HOUR WORK DAYS AND 5 days per week X 2 weeks and if completely resolved may start with full duty. - No concern for any communicable disease at this time - no signs of diarrhea or any other symptoms at this time.      2. Pes planus joint, lower leg     Sprain and strain of unspecified site of knee and leg

## 2021-04-07 ENCOUNTER — TELEPHONE (OUTPATIENT)
Dept: NEUROLOGY | Facility: CLINIC | Age: 61
End: 2021-04-07

## 2021-04-21 ENCOUNTER — TELEMEDICINE (OUTPATIENT)
Dept: FAMILY MEDICINE CLINIC | Facility: CLINIC | Age: 61
End: 2021-04-21

## 2021-04-21 DIAGNOSIS — R23.8 SKIN IRRITATION: ICD-10-CM

## 2021-04-21 DIAGNOSIS — R19.7 DIARRHEA, UNSPECIFIED TYPE: ICD-10-CM

## 2021-04-21 DIAGNOSIS — R51.9 NONINTRACTABLE HEADACHE, UNSPECIFIED CHRONICITY PATTERN, UNSPECIFIED HEADACHE TYPE: Primary | ICD-10-CM

## 2021-04-21 PROCEDURE — 99213 OFFICE O/P EST LOW 20 MIN: CPT | Performed by: FAMILY MEDICINE

## 2021-04-21 NOTE — PROGRESS NOTES
This is a telemedicine visit with live, interactive video and audio. Patient understands and accepts financial responsibility for any deductible, co-insurance and/or co-pays associated with this service.     This care is provided during an unprecedented • High cholesterol    • History of depression 2016   • Irregular bowel habits 2017   • Loss of appetite Recent   • Nausea 2017   • Osteoarthritis     knees   • Pain in joints June 2019    Right knee replacement   • Painful swallowing Sept. 2019   • Post 30 capsule 1   • Metoprolol Succinate ER (TOPROL XL) 50 MG Oral Tablet 24 Hr Take 1 tablet (50 mg total) by mouth daily. 90 tablet 3   • HYDROcodone-acetaminophen  MG Oral Tab Take 1-2 tablets by mouth every 4 (four) hours as needed for Pain.  60 tabl area. Avoid any new make up around this area. If any worsening of symptoms return to clinic / VV visit to discuss further.      Total time spent on day of service: 25 mins         Jane Gomez MD

## 2021-04-22 ENCOUNTER — LAB ENCOUNTER (OUTPATIENT)
Dept: LAB | Age: 61
End: 2021-04-22
Attending: FAMILY MEDICINE
Payer: COMMERCIAL

## 2021-04-22 DIAGNOSIS — R51.9 NONINTRACTABLE HEADACHE, UNSPECIFIED CHRONICITY PATTERN, UNSPECIFIED HEADACHE TYPE: ICD-10-CM

## 2021-04-22 DIAGNOSIS — R19.7 DIARRHEA, UNSPECIFIED TYPE: ICD-10-CM

## 2021-04-23 ENCOUNTER — TELEPHONE (OUTPATIENT)
Dept: FAMILY MEDICINE CLINIC | Facility: CLINIC | Age: 61
End: 2021-04-23

## 2021-04-23 RX ORDER — AZELASTINE HYDROCHLORIDE 0.5 MG/ML
1 SOLUTION/ DROPS OPHTHALMIC 2 TIMES DAILY
Qty: 6 ML | Refills: 0 | Status: SHIPPED | OUTPATIENT
Start: 2021-04-23 | End: 2021-05-03

## 2021-04-23 NOTE — TELEPHONE ENCOUNTER
Pt advised of Dr's note below. Pt verbalized understanding. Pt wanted to report to Dr. Kamron Galeas that her eye is still itchy. Started on left, now it is to both eyes.  Pt reports that she will try OTC allergy medication first before seeing Dr. Kamron Galeas ag

## 2021-04-23 NOTE — TELEPHONE ENCOUNTER
Left detailed message to voicemail, per verbal release form consent, of Doctor's note below. Patient advised to call office back with any questions/concerns.

## 2021-04-23 NOTE — TELEPHONE ENCOUNTER
Sent Optivar (Azelastine) OP drops Instill 1 drop into affected eye(s) twice daily. I will send that to the pharmacy. Please let the patient know. She should also use plain vaseline around the eyes.  ~ MM

## 2021-04-29 ENCOUNTER — OFFICE VISIT (OUTPATIENT)
Dept: NEUROLOGY | Facility: CLINIC | Age: 61
End: 2021-04-29
Payer: COMMERCIAL

## 2021-04-29 VITALS
RESPIRATION RATE: 16 BRPM | HEART RATE: 60 BPM | BODY MASS INDEX: 40 KG/M2 | WEIGHT: 247 LBS | SYSTOLIC BLOOD PRESSURE: 112 MMHG | DIASTOLIC BLOOD PRESSURE: 68 MMHG

## 2021-04-29 DIAGNOSIS — R25.1 SHAKES: ICD-10-CM

## 2021-04-29 DIAGNOSIS — M54.40 BILATERAL LOW BACK PAIN WITH SCIATICA, SCIATICA LATERALITY UNSPECIFIED, UNSPECIFIED CHRONICITY: ICD-10-CM

## 2021-04-29 DIAGNOSIS — R52 PAIN: ICD-10-CM

## 2021-04-29 DIAGNOSIS — G43.109 MIGRAINE EQUIVALENT: ICD-10-CM

## 2021-04-29 DIAGNOSIS — R25.1 TREMOR: Primary | ICD-10-CM

## 2021-04-29 PROBLEM — M79.605 PAIN IN BOTH LOWER EXTREMITIES: Status: ACTIVE | Noted: 2021-04-29

## 2021-04-29 PROBLEM — M79.604 PAIN IN BOTH LOWER EXTREMITIES: Status: ACTIVE | Noted: 2021-04-29

## 2021-04-29 PROCEDURE — 3074F SYST BP LT 130 MM HG: CPT | Performed by: OTHER

## 2021-04-29 PROCEDURE — 99215 OFFICE O/P EST HI 40 MIN: CPT | Performed by: OTHER

## 2021-04-29 PROCEDURE — 3078F DIAST BP <80 MM HG: CPT | Performed by: OTHER

## 2021-04-29 NOTE — PROGRESS NOTES
Patient reports shooting pains in both legs(thighs) for past 7 years. Tremors both hands on and off.

## 2021-04-29 NOTE — PROGRESS NOTES
[facility] [place] [facility]  Neurology   2021    Yobani Stuart Patient Status:  No patient class for patient encounter    3/23/1960 MRN [id number]   Location [unfilled] [facility] depression 2016   • Irregular bowel habits 2017   • Loss of appetite Recent   • Nausea 2017   • Osteoarthritis     knees   • Pain in joints June 2019    Right knee replacement   • Painful swallowing Sept. 2019   • Postmenopause 2010   • Problems with swall (MULTI-VITAMIN/MINERALS) Oral Tab Take 1 tablet by mouth daily. REVIEW OF SYSTEMS:  General: denies any fever or chills. Eye: no pain or redness;  Ear, mouth, throat: no hearing change, no throat pain or soreness;   Respiratory: Denies: Difficul Imaging:  MRI brain, MRA brain and neck,   CONCLUSION:  No MR evidence for acute intracranial infarction. Mild chronic microvascular ischemic changes. Retention cysts in both maxillary sinuses.      MRA demonstrates no evidence of signifi

## 2021-05-14 ENCOUNTER — TELEPHONE (OUTPATIENT)
Dept: PHYSICAL THERAPY | Facility: HOSPITAL | Age: 61
End: 2021-05-14

## 2021-05-17 ENCOUNTER — TELEPHONE (OUTPATIENT)
Dept: PHYSICAL THERAPY | Age: 61
End: 2021-05-17

## 2021-05-17 ENCOUNTER — OFFICE VISIT (OUTPATIENT)
Dept: PHYSICAL THERAPY | Age: 61
End: 2021-05-17
Attending: Other
Payer: COMMERCIAL

## 2021-05-17 ENCOUNTER — TELEPHONE (OUTPATIENT)
Dept: NEUROLOGY | Facility: CLINIC | Age: 61
End: 2021-05-17

## 2021-05-17 DIAGNOSIS — M54.40 BILATERAL LOW BACK PAIN WITH SCIATICA, SCIATICA LATERALITY UNSPECIFIED, UNSPECIFIED CHRONICITY: ICD-10-CM

## 2021-05-17 PROCEDURE — 97162 PT EVAL MOD COMPLEX 30 MIN: CPT

## 2021-05-17 PROCEDURE — 97535 SELF CARE MNGMENT TRAINING: CPT

## 2021-05-17 NOTE — TELEPHONE ENCOUNTER
PT calls, leaves message to inform pt of contact with neurologist. PT leaves call back number for voicemail.

## 2021-05-17 NOTE — TELEPHONE ENCOUNTER
MD Linnea Montoya Nurse 59 minutes ago (2:51 PM)     I called her, no answer, I got massage from her PT, she had episode of urinary incontinence. Please have her do EEG, MRI of cervical and thoracic spine, lumbar spine   I will put in order.  Wi

## 2021-05-17 NOTE — PROGRESS NOTES
SPINE EVALUATION:   Referring Physician: Dr. Anna Marie Everett  Diagnosis: B low back pain with sciatica     Date of Service: 5/17/2021     PATIENT SUMMARY   Ann Simmonds is a 63year old female who presents to therapy today with complaints of tremors BUE and B LE independent, long history of migraine and back pain but able to manage independently. Pt goals include pain reduction/elimination. Past medical history was reviewed with Tre Westfall.  Significant findings include has a past medical history of Abdominal pain (20 Skilled Physical Therapy is medically necessary to address the above impairments and reach functional goals.      Precautions:  None  OBJECTIVE:   Observation/Posture: No acute distress, L knee greater surface area than R  Neuro Screen: numbness reports in active  Patient was instructed in and issued a HEP for: walking program and MD follow up, education on PT findings, inflammatory conditions and rest, avoiding over exertion    Charges: PT Eval Moderate Complexity, 1 self care      Total Timed Treatment: 9 assessment  Rehab Potential:fair    FOTO: 48/100 Risk adjusted    Patient/Family/Caregiver was advised of these findings, precautions, and treatment options and has agreed to actively participate in planning and for this course of care.     Thank you for yo

## 2021-05-18 NOTE — TELEPHONE ENCOUNTER
Called patient and relayed the tests noted below that provider would like patient to complete. Patient reports EEG, MRIs and EMG are all scheduled and she will follow up.     Patient states she will seek ER/UC attention if she notes more episodes of urin

## 2021-05-21 ENCOUNTER — APPOINTMENT (OUTPATIENT)
Dept: PHYSICAL THERAPY | Age: 61
End: 2021-05-21
Attending: Other
Payer: COMMERCIAL

## 2021-05-25 ENCOUNTER — APPOINTMENT (OUTPATIENT)
Dept: PHYSICAL THERAPY | Age: 61
End: 2021-05-25
Attending: Other
Payer: COMMERCIAL

## 2021-05-25 RX ORDER — LISINOPRIL 40 MG/1
40 TABLET ORAL DAILY
Qty: 90 TABLET | Refills: 0 | OUTPATIENT
Start: 2021-05-25

## 2021-05-25 RX ORDER — LISINOPRIL 40 MG/1
40 TABLET ORAL DAILY
Qty: 90 TABLET | Refills: 0 | Status: SHIPPED | OUTPATIENT
Start: 2021-05-25 | End: 2021-10-01

## 2021-05-25 NOTE — TELEPHONE ENCOUNTER
Hypertension Medications Protocol [id number]/25/2021 07:31 AM   CMP or BMP in past 12 months Protocol Details    Last serum creatinine< 2.0     Appointment in past 6 or next 3 months      Last refilled on 02/03/2021 for # 90 with 0 rf.    Last labs 11/21/2020

## 2021-05-25 NOTE — TELEPHONE ENCOUNTER
Last OV: 3/16/21  Last refill: 2/3/21 #90 Tablets w/ 0 refills  Lab: SERAFIN on 12/3/20  Requested Prescriptions     Pending Prescriptions Disp Refills   • lisinopril 40 MG Oral Tab 90 tablet 0     Sig: Take 1 tablet (40 mg total) by mouth daily.      Future Ap

## 2021-05-25 NOTE — TELEPHONE ENCOUNTER
Refused, because it looks like Dr Hodan Ruggiero already renewed this prescription. Please confirm and let me know.  Thanks ~ MM

## 2021-05-26 ENCOUNTER — TELEPHONE (OUTPATIENT)
Dept: SURGERY | Facility: CLINIC | Age: 61
End: 2021-05-26

## 2021-05-26 NOTE — TELEPHONE ENCOUNTER
Reason for denial on MRI cervical 36000, MRI thoracic 28622, MRI Lumbar 98099:    MRI Denied they needs a clear reason why MRI's are needed. Medical records received does not indicate clear reason.     Provider can call [phone] #1 then #2 to speak to a

## 2021-05-26 NOTE — TELEPHONE ENCOUNTER
Sent to pharmacy as: Lisinopril 40 MG Oral Tablet    E-Prescribing Status: Receipt confirmed by pharmacy (5/25/2021  3:06 PM CDT)      Authorizing Provider   Guadalupe Galindo MD

## 2021-05-27 RX ORDER — HYDROCODONE BITARTRATE AND ACETAMINOPHEN 10; 325 MG/1; MG/1
1-2 TABLET ORAL EVERY 4 HOURS PRN
Qty: 60 TABLET | Refills: 0 | OUTPATIENT
Start: 2021-05-27

## 2021-05-27 NOTE — TELEPHONE ENCOUNTER
I tried notifying patient that her refill was denied by Dr. Macario Guerra, but her voice mailbox was full.

## 2021-05-27 NOTE — TELEPHONE ENCOUNTER
Total knee last year - refill not appropriate at this time    Being treated elsewhere for back pain and sciatica

## 2021-05-28 ENCOUNTER — APPOINTMENT (OUTPATIENT)
Dept: PHYSICAL THERAPY | Age: 61
End: 2021-05-28
Attending: Other
Payer: COMMERCIAL

## 2021-05-28 ENCOUNTER — TELEPHONE (OUTPATIENT)
Dept: PHYSICAL THERAPY | Age: 61
End: 2021-05-28

## 2021-06-01 ENCOUNTER — TELEPHONE (OUTPATIENT)
Dept: PHYSICAL THERAPY | Age: 61
End: 2021-06-01

## 2021-06-01 ENCOUNTER — APPOINTMENT (OUTPATIENT)
Dept: PHYSICAL THERAPY | Age: 61
End: 2021-06-01
Attending: Other
Payer: COMMERCIAL

## 2021-06-01 NOTE — TELEPHONE ENCOUNTER
Her mail box is full, I tried to call her, not able to leave a message. Tell her insurance decision.    The reason to order spine MRI is due to her report of back pain and urinary incontinence on 5/17/2021,   Please ask her if she remains to have incontinen

## 2021-06-03 NOTE — TELEPHONE ENCOUNTER
Attempted to reach patient but no answer and VM full. Message left on Luann's VM (ok per HIPAA consent) to have her mom call Dr Uriah Ayala nurse.

## 2021-06-03 NOTE — TELEPHONE ENCOUNTER
Spoke with patient who states the urinary incontinence comes and goes and she does not have back pain at this time.  Patient given Dr Heidi Christian recommendations of patient following up with PCP or Urologist if incontinence persists or got to ER is another opti

## 2021-06-04 ENCOUNTER — APPOINTMENT (OUTPATIENT)
Dept: PHYSICAL THERAPY | Age: 61
End: 2021-06-04
Attending: Other
Payer: COMMERCIAL

## 2021-06-04 ENCOUNTER — TELEPHONE (OUTPATIENT)
Dept: FAMILY MEDICINE CLINIC | Facility: CLINIC | Age: 61
End: 2021-06-04

## 2021-06-04 DIAGNOSIS — R73.02 IMPAIRED GLUCOSE TOLERANCE: Primary | ICD-10-CM

## 2021-06-04 NOTE — TELEPHONE ENCOUNTER
[employer] (of [facility]) advised that last A1C done was 11/2020, less than a year ago. Pt is NOT diabetic, therefore insurance may not cover A1C lab draw today. [facility] advised to notify pt of insurance coverage, she verbalized understanding. Order placed.   Pt

## 2021-06-04 NOTE — TELEPHONE ENCOUNTER
Candance Hymen from [facility] reference labs called pt is coming in at 10 to have A1C done but she does not have an order in for that. Can we please place order?

## 2021-06-08 ENCOUNTER — TELEPHONE (OUTPATIENT)
Dept: PHYSICAL THERAPY | Age: 61
End: 2021-06-08

## 2021-06-08 ENCOUNTER — APPOINTMENT (OUTPATIENT)
Dept: PHYSICAL THERAPY | Age: 61
End: 2021-06-08
Attending: Other
Payer: COMMERCIAL

## 2021-06-08 NOTE — TELEPHONE ENCOUNTER
Pt co burning pain in both legs, incontinent episodes \"not all the time\" ankles feel like on their fire. She reports all her EEGs and MRIs have been denied or rescheduled due to insurance denials. She is tearful.  She reports feeling abandoned by her do

## 2021-06-11 ENCOUNTER — OFFICE VISIT (OUTPATIENT)
Dept: PHYSICAL THERAPY | Age: 61
End: 2021-06-11
Attending: Other
Payer: COMMERCIAL

## 2021-06-11 ENCOUNTER — APPOINTMENT (OUTPATIENT)
Dept: PHYSICAL THERAPY | Age: 61
End: 2021-06-11
Attending: Other
Payer: COMMERCIAL

## 2021-06-11 PROCEDURE — 97110 THERAPEUTIC EXERCISES: CPT

## 2021-06-11 PROCEDURE — 97112 NEUROMUSCULAR REEDUCATION: CPT

## 2021-06-11 NOTE — PROGRESS NOTES
Dx: B low back pain with sciatica             Insurance (Authorized # of Visits):  45 visit max 13 used as of 5/16/21           Authorizing Physician: Dr. Igor Jay  Next MD visit: none scheduled  Fall Risk: standard         Precautions: n/a             Subjec to be met in 10 visits)   · Pt will improve transversus abdominis recruitment to perform proper isometric contraction without requiring verbal or tactile cuing to promote advancement of therex   · Pt will demonstrate good understanding of proper posture

## 2021-06-14 ENCOUNTER — TELEPHONE (OUTPATIENT)
Dept: FAMILY MEDICINE CLINIC | Facility: CLINIC | Age: 61
End: 2021-06-14

## 2021-06-14 NOTE — TELEPHONE ENCOUNTER
Left message to voicemail (per verbal release form consent, confirmed with identifying message.) Patient advised to call office back [phone], to discuss 's note below

## 2021-06-14 NOTE — TELEPHONE ENCOUNTER
Pt reports that Dr. Diallo Gutierrez is aware of pt's stool incontinence. Pt reports she had diarrhea this morning. Pt reports having urinary incontinence, but was told to start kegel exercises.   Pt reports she is having shooting pain to \"top part of leg,\" roseann

## 2021-06-14 NOTE — TELEPHONE ENCOUNTER
----- Message from Jane Roche MD sent at 6/13/2021 11:21 PM CDT -----  Regarding: FW: Patient Status Update  Please call patient and inquire regarding these symptoms.  If she has already seen someone she is ok, however if still with these sy

## 2021-06-17 ENCOUNTER — APPOINTMENT (OUTPATIENT)
Dept: PHYSICAL THERAPY | Age: 61
End: 2021-06-17
Attending: Other
Payer: COMMERCIAL

## 2021-06-17 ENCOUNTER — OFFICE VISIT (OUTPATIENT)
Dept: FAMILY MEDICINE CLINIC | Facility: CLINIC | Age: 61
End: 2021-06-17
Payer: COMMERCIAL

## 2021-06-17 VITALS
RESPIRATION RATE: 16 BRPM | HEART RATE: 71 BPM | WEIGHT: 249.5 LBS | BODY MASS INDEX: 40.1 KG/M2 | SYSTOLIC BLOOD PRESSURE: 126 MMHG | DIASTOLIC BLOOD PRESSURE: 86 MMHG | HEIGHT: 66 IN | OXYGEN SATURATION: 98 % | TEMPERATURE: 98 F

## 2021-06-17 DIAGNOSIS — R52 SHOOTING PAIN: ICD-10-CM

## 2021-06-17 DIAGNOSIS — M79.605 BILATERAL LEG PAIN: Primary | ICD-10-CM

## 2021-06-17 DIAGNOSIS — M79.604 BILATERAL LEG PAIN: Primary | ICD-10-CM

## 2021-06-17 DIAGNOSIS — R32 URINARY INCONTINENCE, UNSPECIFIED TYPE: ICD-10-CM

## 2021-06-17 PROCEDURE — 3079F DIAST BP 80-89 MM HG: CPT | Performed by: FAMILY MEDICINE

## 2021-06-17 PROCEDURE — 3008F BODY MASS INDEX DOCD: CPT | Performed by: FAMILY MEDICINE

## 2021-06-17 PROCEDURE — 99214 OFFICE O/P EST MOD 30 MIN: CPT | Performed by: FAMILY MEDICINE

## 2021-06-17 PROCEDURE — 3074F SYST BP LT 130 MM HG: CPT | Performed by: FAMILY MEDICINE

## 2021-06-17 RX ORDER — SERTRALINE HYDROCHLORIDE 100 MG/1
100 TABLET, FILM COATED ORAL DAILY
COMMUNITY
Start: 2021-05-07

## 2021-06-17 NOTE — PROGRESS NOTES
[facility] Family Medicine Office Note  Chief Complaint:   Patient presents with: Follow - Up      HPI:   This is a 63year old female coming in for follow up regarding neurologist - Dr Teddy Verde - PT and has been seen physical therapy. Long shifts. rarely    Drug use: No    Family History:  Family History   Problem Relation Age of Onset   • Heart Disorder Father    • Diabetes Father         Passed   • Other (Other) Father    • Heart Attack Father         Passed   • Heart Disorder Mother    • Other (Lovely Jenkins calculated from the following:    Height as of this encounter: 5' 6\" (1.676 m). Weight as of this encounter: 249 lb 8 oz (113.2 kg). Vital signs reviewed. Appears stated age, well groomed.   Physical Exam     GEN: Not in any acute distress, making good Physical due on 07/02/2021  Mammogram due on 08/21/2021    Patient/Caregiver Education: Patient/Caregiver Education: There are no barriers to learning. Medical education done. Outcome: Patient verbalizes understanding.  Patient is notified to call with an

## 2021-06-22 ENCOUNTER — OFFICE VISIT (OUTPATIENT)
Dept: PHYSICAL THERAPY | Age: 61
End: 2021-06-22
Attending: Other
Payer: COMMERCIAL

## 2021-06-22 DIAGNOSIS — M54.40 BILATERAL LOW BACK PAIN WITH SCIATICA, SCIATICA LATERALITY UNSPECIFIED, UNSPECIFIED CHRONICITY: ICD-10-CM

## 2021-06-22 PROCEDURE — 97112 NEUROMUSCULAR REEDUCATION: CPT

## 2021-06-22 PROCEDURE — 97110 THERAPEUTIC EXERCISES: CPT

## 2021-06-22 PROCEDURE — 97140 MANUAL THERAPY 1/> REGIONS: CPT

## 2021-06-22 NOTE — PROGRESS NOTES
Dx: B low back pain with sciatica             Insurance (Authorized # of Visits):  45 visit max 13 used as of 5/16/21           Authorizing Physician: Dr. Caroline Paula  Next MD visit: none scheduled  Fall Risk: standard         Precautions: n/a             Subjec ROM deficits and increased overall activity tolerance for return to PLOF  Date: 6/11/2021  TX#: 2/10 Date: 6/22/21                TX#: 3/10 Date:                 TX#: 4/ Date:                 TX#: 5/ Date:    Tx#: 6/ THERE EX:  SL manual hip flexor, quad

## 2021-06-24 ENCOUNTER — NURSE ONLY (OUTPATIENT)
Dept: ELECTROPHYSIOLOGY | Facility: HOSPITAL | Age: 61
End: 2021-06-24
Attending: FAMILY MEDICINE
Payer: COMMERCIAL

## 2021-06-24 DIAGNOSIS — R25.1 SHAKES: ICD-10-CM

## 2021-06-24 PROCEDURE — 95816 EEG AWAKE AND DROWSY: CPT | Performed by: OTHER

## 2021-06-24 NOTE — PROCEDURES
[facility] - ELECTROENCEPHALOGRAM (EEG) REPORT  Patient Name:  Kiara Simon   MRN / CSN:  [id number] / [id number]   Date of Birth / Age:  3/23/1960 /  63year old   Encounter Date:  6/24/21         METHODS:  Twenty-two electrodes were applied according to the 10

## 2021-06-25 ENCOUNTER — OFFICE VISIT (OUTPATIENT)
Dept: PHYSICAL THERAPY | Age: 61
End: 2021-06-25
Attending: Other
Payer: COMMERCIAL

## 2021-06-25 DIAGNOSIS — M54.40 BILATERAL LOW BACK PAIN WITH SCIATICA, SCIATICA LATERALITY UNSPECIFIED, UNSPECIFIED CHRONICITY: ICD-10-CM

## 2021-06-25 PROCEDURE — 97110 THERAPEUTIC EXERCISES: CPT

## 2021-06-25 PROCEDURE — 97112 NEUROMUSCULAR REEDUCATION: CPT

## 2021-06-25 PROCEDURE — 97140 MANUAL THERAPY 1/> REGIONS: CPT

## 2021-06-25 NOTE — PROGRESS NOTES
Dx: B low back pain with sciatica             Insurance (Authorized # of Visits):  45 visit max 13 used as of 5/16/21           Authorizing Physician: Dr. Beatriz Solorio  Next MD visit: none scheduled  Fall Risk: standard         Precautions: n/a             Subjec overall activity tolerance for return to PLOF   Date: 6/11/2021  TX#: 2/10 Date: 6/22/21                TX#: 3/10 Date: 6/25/21                TX#: 4/10 Date:                 TX#: 5/ Date:    Tx#: 6/ THERE EX:  SL manual hip flexor, quad and ITB stretch R

## 2021-06-29 NOTE — PROGRESS NOTES
[facility] in [place] with [facility]  Neurology   2021    Caitlin Smith Patient Status:  No patient class for patient encounter    3/23/1960 MRN [id number]   Location [unfilled] [facility] knees   • Pain in joints June 2019    Right knee replacement   • Painful swallowing Sept. 2019   • Postmenopause 2010   • Problems with swallowing    • Uncomfortable fullness after meals Sept. 2019   • Visual impairment     glasses   • Vomiting 2017    Res GI: no abdominal pain, no nausea or diarrhea;  : no incontinence; Neurological:  See history; relevant items discussed in the history.   Psychiatric: no depression, no suicidal attempt,   Musculoskeletal:  NO current complaint,  Endo: no cold intoleran Dictated by: Castillo Zayas MD on 7/19/2018 at 22:57       Approved by: Castillo Zayas MD         EEG  This EEG is a normal study recorded in the awake and drowsy states.  No focal, lateralizing, or epileptiform activity is seen and no seizures are record

## 2021-07-02 ENCOUNTER — APPOINTMENT (OUTPATIENT)
Dept: PHYSICAL THERAPY | Age: 61
End: 2021-07-02
Payer: COMMERCIAL

## 2021-07-02 RX ORDER — FUROSEMIDE 40 MG/1
40 TABLET ORAL DAILY
Qty: 90 TABLET | Refills: 0 | Status: SHIPPED | OUTPATIENT
Start: 2021-07-02 | End: 2021-08-28

## 2021-07-02 NOTE — TELEPHONE ENCOUNTER
Last OV: 3/16/21  Last refill: 3/24/21 #90 Tablets w/ 0 refills  Labs: [facility] on 12/3/20  Requested Prescriptions     Pending Prescriptions Disp Refills   • furosemide 40 MG Oral Tab 90 tablet 0     Sig: Take 1 tablet (40 mg total) by mouth daily.      Future

## 2021-07-07 ENCOUNTER — OFFICE VISIT (OUTPATIENT)
Dept: FAMILY MEDICINE CLINIC | Facility: CLINIC | Age: 61
End: 2021-07-07
Payer: COMMERCIAL

## 2021-07-07 VITALS
DIASTOLIC BLOOD PRESSURE: 84 MMHG | WEIGHT: 249.13 LBS | HEART RATE: 67 BPM | BODY MASS INDEX: 39.56 KG/M2 | RESPIRATION RATE: 16 BRPM | SYSTOLIC BLOOD PRESSURE: 138 MMHG | HEIGHT: 66.5 IN | OXYGEN SATURATION: 97 % | TEMPERATURE: 98 F

## 2021-07-07 DIAGNOSIS — Z12.31 ENCOUNTER FOR SCREENING MAMMOGRAM FOR MALIGNANT NEOPLASM OF BREAST: ICD-10-CM

## 2021-07-07 DIAGNOSIS — R73.03 PREDIABETES: ICD-10-CM

## 2021-07-07 DIAGNOSIS — R32 URINARY INCONTINENCE, UNSPECIFIED TYPE: ICD-10-CM

## 2021-07-07 DIAGNOSIS — I10 ESSENTIAL HYPERTENSION: ICD-10-CM

## 2021-07-07 DIAGNOSIS — Z00.00 ANNUAL PHYSICAL EXAM: Primary | ICD-10-CM

## 2021-07-07 LAB
ALBUMIN SERPL-MCNC: 3.4 G/DL (ref 3.4–5)
ALBUMIN/GLOB SERPL: 0.9 {RATIO} (ref 1–2)
ALP LIVER SERPL-CCNC: 98 U/L
ALT SERPL-CCNC: 26 U/L
ANION GAP SERPL CALC-SCNC: 4 MMOL/L (ref 0–18)
AST SERPL-CCNC: 12 U/L (ref 15–37)
BASOPHILS # BLD AUTO: 0.05 X10(3) UL (ref 0–0.2)
BASOPHILS NFR BLD AUTO: 0.8 %
BILIRUB SERPL-MCNC: 0.4 MG/DL (ref 0.1–2)
BUN BLD-MCNC: 20 MG/DL (ref 7–18)
BUN/CREAT SERPL: 27 (ref 10–20)
CALCIUM BLD-MCNC: 9.2 MG/DL (ref 8.5–10.1)
CHLORIDE SERPL-SCNC: 111 MMOL/L (ref 98–112)
CHOLEST SMN-MCNC: 210 MG/DL (ref ?–200)
CO2 SERPL-SCNC: 29 MMOL/L (ref 21–32)
CREAT BLD-MCNC: 0.74 MG/DL
DEPRECATED RDW RBC AUTO: 41 FL (ref 35.1–46.3)
EOSINOPHIL # BLD AUTO: 0.1 X10(3) UL (ref 0–0.7)
EOSINOPHIL NFR BLD AUTO: 1.7 %
ERYTHROCYTE [DISTWIDTH] IN BLOOD BY AUTOMATED COUNT: 11.8 % (ref 11–15)
GLOBULIN PLAS-MCNC: 3.7 G/DL (ref 2.8–4.4)
GLUCOSE BLD-MCNC: 104 MG/DL (ref 70–99)
HCT VFR BLD AUTO: 41.8 %
HDLC SERPL-MCNC: 64 MG/DL (ref 40–59)
HGB BLD-MCNC: 12.9 G/DL
IMM GRANULOCYTES # BLD AUTO: 0.03 X10(3) UL (ref 0–1)
IMM GRANULOCYTES NFR BLD: 0.5 %
LDLC SERPL CALC-MCNC: 115 MG/DL (ref ?–100)
LYMPHOCYTES # BLD AUTO: 1.29 X10(3) UL (ref 1–4)
LYMPHOCYTES NFR BLD AUTO: 21.8 %
M PROTEIN MFR SERPL ELPH: 7.1 G/DL (ref 6.4–8.2)
MCH RBC QN AUTO: 29.4 PG (ref 26–34)
MCHC RBC AUTO-ENTMCNC: 30.9 G/DL (ref 31–37)
MCV RBC AUTO: 95.2 FL
MONOCYTES # BLD AUTO: 0.5 X10(3) UL (ref 0.1–1)
MONOCYTES NFR BLD AUTO: 8.5 %
NEUTROPHILS # BLD AUTO: 3.94 X10 (3) UL (ref 1.5–7.7)
NEUTROPHILS # BLD AUTO: 3.94 X10(3) UL (ref 1.5–7.7)
NEUTROPHILS NFR BLD AUTO: 66.7 %
NONHDLC SERPL-MCNC: 146 MG/DL (ref ?–130)
OSMOLALITY SERPL CALC.SUM OF ELEC: 301 MOSM/KG (ref 275–295)
PATIENT FASTING Y/N/NP: YES
PATIENT FASTING Y/N/NP: YES
PLATELET # BLD AUTO: 316 10(3)UL (ref 150–450)
POTASSIUM SERPL-SCNC: 5.5 MMOL/L (ref 3.5–5.1)
RBC # BLD AUTO: 4.39 X10(6)UL
SODIUM SERPL-SCNC: 144 MMOL/L (ref 136–145)
TRIGL SERPL-MCNC: 177 MG/DL (ref 30–149)
TSI SER-ACNC: 2.71 MIU/ML (ref 0.36–3.74)
VLDLC SERPL CALC-MCNC: 31 MG/DL (ref 0–30)
WBC # BLD AUTO: 5.9 X10(3) UL (ref 4–11)

## 2021-07-07 PROCEDURE — 80061 LIPID PANEL: CPT | Performed by: FAMILY MEDICINE

## 2021-07-07 PROCEDURE — 3079F DIAST BP 80-89 MM HG: CPT | Performed by: FAMILY MEDICINE

## 2021-07-07 PROCEDURE — 90471 IMMUNIZATION ADMIN: CPT | Performed by: FAMILY MEDICINE

## 2021-07-07 PROCEDURE — 90732 PPSV23 VACC 2 YRS+ SUBQ/IM: CPT | Performed by: FAMILY MEDICINE

## 2021-07-07 PROCEDURE — 3008F BODY MASS INDEX DOCD: CPT | Performed by: FAMILY MEDICINE

## 2021-07-07 PROCEDURE — 83036 HEMOGLOBIN GLYCOSYLATED A1C: CPT | Performed by: FAMILY MEDICINE

## 2021-07-07 PROCEDURE — 99396 PREV VISIT EST AGE 40-64: CPT | Performed by: FAMILY MEDICINE

## 2021-07-07 PROCEDURE — 3075F SYST BP GE 130 - 139MM HG: CPT | Performed by: FAMILY MEDICINE

## 2021-07-07 PROCEDURE — 80050 GENERAL HEALTH PANEL: CPT | Performed by: FAMILY MEDICINE

## 2021-07-07 NOTE — PROGRESS NOTES
[facility] Family Medicine Office Note  Chief Complaint:   Patient presents with:  Physical      HPI:   This is a 63year old female coming in for CPX   No complaints   Fasting today   Has not taken her medications as yet   No prior problems with status: Former Smoker        Packs/day: 1.00        Years: 30.00        Pack years: 27        Quit date: 2011        Years since quitting: 10.2      Smokeless tobacco: Never Used    Vaping Use      Vaping Use: Never used    Alcohol use: Yes      Comment: paddy for as mentioned in the HPI      EXAM:   /84   Pulse 67   Temp 98.1 °F (36.7 °C) (Temporal)   Resp 16   Ht 5' 6.5\" (1.689 m)   Wt 249 lb 2 oz (113 kg)   LMP 11/05/2010   SpO2 97%   BMI 39.61 kg/m²  Estimated body mass index is 39.61 kg/m² as calcula neoplasm of breast  - SADIA TYLER 2D+3D SCREENING BILAT (CPT=77067/84138); Future    All labs drawn today fasting   You will be called with your results   BP under good control - continue current medications   Weight gain discussed that may influence A1C  ?  P without obstruction or gangrene     Sleep disorder     Knee osteomyelits, right (HCC)     Murmur     Gastroesophageal reflux disease     Anxiety and depression     Class 3 severe obesity without serious comorbidity with body mass index (BMI) of 40.0 to 44.

## 2021-07-07 NOTE — PATIENT INSTRUCTIONS
All labs drawn today fasting   You will be called with your results   BP under good control - continue current medications   Weight gain discussed that may influence A1C  ?  Prediabetic   Mammogram order placed - schedule the next one after 8.21.2021 (lat reading is higher than normal, follow the advice of your healthcare provider    Breast cancer All women at average risk in this age group  Yearly mammogram should be done until age 47.  At age 54, you can switch to every other year or choose to continue yematias provider about your risk    HIV All women At least once during your lifetime; yearly if at high risk    Lung cancer Women between the ages of 54 to 76 who are in fairly good health and are at higher risk for lung cancer       · Currently smoke or have  Sunday  1 or 2doses   Meningococcal Women at increased risk for infection  1 or more doses; talk with your healthcare provider    Pneumococcal conjugate vaccine (PCV13) and pneumococcal polysaccharide vaccine (PPSV23)  Women at increased risk for infection  PCV13: intended as a substitute for professional medical care. Always follow your healthcare professional's instructions.

## 2021-07-08 ENCOUNTER — TELEPHONE (OUTPATIENT)
Dept: FAMILY MEDICINE CLINIC | Facility: CLINIC | Age: 61
End: 2021-07-08

## 2021-07-08 ENCOUNTER — OFFICE VISIT (OUTPATIENT)
Dept: ELECTROPHYSIOLOGY | Facility: HOSPITAL | Age: 61
End: 2021-07-08
Attending: Other
Payer: COMMERCIAL

## 2021-07-08 DIAGNOSIS — R52 PAIN: ICD-10-CM

## 2021-07-08 DIAGNOSIS — E87.5 HYPERKALEMIA: Primary | ICD-10-CM

## 2021-07-08 DIAGNOSIS — R25.1 TREMOR: Primary | ICD-10-CM

## 2021-07-08 LAB
EST. AVERAGE GLUCOSE BLD GHB EST-MCNC: 126 MG/DL (ref 68–126)
HBA1C MFR BLD HPLC: 6 % (ref ?–5.7)

## 2021-07-08 PROCEDURE — 95886 MUSC TEST DONE W/N TEST COMP: CPT | Performed by: OTHER

## 2021-07-08 PROCEDURE — 95913 NRV CNDJ TEST 13/> STUDIES: CPT | Performed by: OTHER

## 2021-07-08 NOTE — TELEPHONE ENCOUNTER
Future Appointments   Date Time Provider Manisha Osullivan   7/14/2021 11:00 AM [facility] NURSE [facility] Jacquie Mcpherson     Future order placed per verbal order

## 2021-07-08 NOTE — TELEPHONE ENCOUNTER
----- Message from Jane Rodríguez MD sent at 7/8/2021  3:00 PM CDT -----  Called patient and discussed test results.  No muscle soreness or aches and pains, and has not performed any strenuous / exertional activities, and no tea colored / dark u

## 2021-07-09 ENCOUNTER — DOCUMENTATION ONLY (OUTPATIENT)
Dept: FAMILY MEDICINE CLINIC | Facility: CLINIC | Age: 61
End: 2021-07-09

## 2021-07-09 ENCOUNTER — OFFICE VISIT (OUTPATIENT)
Dept: PHYSICAL THERAPY | Age: 61
End: 2021-07-09
Attending: Other
Payer: COMMERCIAL

## 2021-07-09 PROCEDURE — 97535 SELF CARE MNGMENT TRAINING: CPT

## 2021-07-09 NOTE — PROCEDURES
[facility], [address] [place] [address]      PATIENT'S NAME: Mitamk Dang   REFERRING PHYSICIAN: Thu Musa M.D.    PATIENT ACCOUNT #: [de-identified] LOCATION: [facility]   MEDICAL RECORD #: [id number] YOB: 1960

## 2021-07-09 NOTE — PROGRESS NOTES
Dx: B low back pain with sciatica             Insurance (Authorized # of Visits):  45 visit max 13 used as of 5/16/21           Authorizing Physician: Dr. Italia Mix  Next MD visit: none scheduled  Fall Risk: standard         Precautions: n/a             Subjec strength and soft tissue length and increased overall activity tolerance for return to PLOF   Date: 6/11/2021  TX#: 2/10 Date: 6/22/21                TX#: 3/10 Date: 6/25/21                TX#: 4/10 Date: 7/9/21                TX#: 5/10 Date:    Tx#: 6/ T

## 2021-07-12 ENCOUNTER — NURSE ONLY (OUTPATIENT)
Dept: FAMILY MEDICINE CLINIC | Facility: CLINIC | Age: 61
End: 2021-07-12
Payer: COMMERCIAL

## 2021-07-12 DIAGNOSIS — E87.5 HYPERKALEMIA: ICD-10-CM

## 2021-07-12 DIAGNOSIS — R73.02 IMPAIRED GLUCOSE TOLERANCE: ICD-10-CM

## 2021-07-12 LAB
ANION GAP SERPL CALC-SCNC: 3 MMOL/L (ref 0–18)
BUN BLD-MCNC: 18 MG/DL (ref 7–18)
BUN/CREAT SERPL: 20 (ref 10–20)
CALCIUM BLD-MCNC: 9.2 MG/DL (ref 8.5–10.1)
CHLORIDE SERPL-SCNC: 105 MMOL/L (ref 98–112)
CO2 SERPL-SCNC: 31 MMOL/L (ref 21–32)
CREAT BLD-MCNC: 0.9 MG/DL
EST. AVERAGE GLUCOSE BLD GHB EST-MCNC: 126 MG/DL (ref 68–126)
GLUCOSE BLD-MCNC: 117 MG/DL (ref 70–99)
HBA1C MFR BLD HPLC: 6 % (ref ?–5.7)
OSMOLALITY SERPL CALC.SUM OF ELEC: 291 MOSM/KG (ref 275–295)
PATIENT FASTING Y/N/NP: NO
POTASSIUM SERPL-SCNC: 4.7 MMOL/L (ref 3.5–5.1)
SODIUM SERPL-SCNC: 139 MMOL/L (ref 136–145)

## 2021-07-12 PROCEDURE — 83036 HEMOGLOBIN GLYCOSYLATED A1C: CPT | Performed by: FAMILY MEDICINE

## 2021-07-12 PROCEDURE — 3044F HG A1C LEVEL LT 7.0%: CPT | Performed by: FAMILY MEDICINE

## 2021-07-12 PROCEDURE — 80048 BASIC METABOLIC PNL TOTAL CA: CPT | Performed by: FAMILY MEDICINE

## 2021-07-12 NOTE — PROGRESS NOTES
Tessa Carranza present in office for nurse visit. Labs drawn as ordered by Dr. Manuel Mora    22 g, Left AC, lavender tube and green tube    All questions/concerns addressed. Patient left in stable condition.

## 2021-07-13 ENCOUNTER — APPOINTMENT (OUTPATIENT)
Dept: PHYSICAL THERAPY | Age: 61
End: 2021-07-13
Attending: Other
Payer: COMMERCIAL

## 2021-07-13 ENCOUNTER — TELEPHONE (OUTPATIENT)
Dept: FAMILY MEDICINE CLINIC | Facility: CLINIC | Age: 61
End: 2021-07-13

## 2021-07-13 NOTE — TELEPHONE ENCOUNTER
PT CALLED AND ADV HAD BLOOD WORK DONE. WOULD LIKE TO KNOW WHAT DR RUBIO'S THOUGHTS ARE.     PLEASE ADV ONCE ADDRESSED    THANK YOU

## 2021-07-14 ENCOUNTER — DOCUMENTATION ONLY (OUTPATIENT)
Dept: FAMILY MEDICINE CLINIC | Facility: CLINIC | Age: 61
End: 2021-07-14

## 2021-07-14 NOTE — TELEPHONE ENCOUNTER
Patient Communication  Seen by patient Nataliya Canchola on 7/14/2021  9:28 AM CDT Spine appears normal,

## 2021-07-14 NOTE — TELEPHONE ENCOUNTER
[facility] already sent to the patient regarding her potassium being back to normal. ~ MM

## 2021-07-16 ENCOUNTER — APPOINTMENT (OUTPATIENT)
Dept: PHYSICAL THERAPY | Age: 61
End: 2021-07-16
Attending: Other
Payer: COMMERCIAL

## 2021-07-17 ENCOUNTER — PATIENT MESSAGE (OUTPATIENT)
Dept: FAMILY MEDICINE CLINIC | Facility: CLINIC | Age: 61
End: 2021-07-17

## 2021-07-19 ENCOUNTER — DOCUMENTATION ONLY (OUTPATIENT)
Dept: FAMILY MEDICINE CLINIC | Facility: CLINIC | Age: 61
End: 2021-07-19

## 2021-07-19 NOTE — TELEPHONE ENCOUNTER
From: Angela Durand  To: Jane Pena MD  Sent: 7/17/2021 6:19 PM CDT  Subject: Other    Hello can I please have a note from you stating that because of a medical condition I must stay hydrated and will use the bathroom more than hen usual?

## 2021-07-20 ENCOUNTER — OFFICE VISIT (OUTPATIENT)
Dept: PHYSICAL THERAPY | Age: 61
End: 2021-07-20
Attending: Other
Payer: COMMERCIAL

## 2021-07-20 PROCEDURE — 97140 MANUAL THERAPY 1/> REGIONS: CPT

## 2021-07-20 PROCEDURE — 97110 THERAPEUTIC EXERCISES: CPT

## 2021-07-20 NOTE — PROGRESS NOTES
Dx: B low back pain with sciatica             Insurance (Authorized # of Visits):  45 visit max 13 used as of 5/16/21           Authorizing Physician: Dr. Tello Varela MD visit: none scheduled  Fall Risk: standard         Precautions: n/a             Subjec 6/25/21                TX#: 4/10 Date: 7/9/21                TX#: 5/10 Date: 7/20/21  Tx#: 6/10   THERE EX:  SL manual hip flexor, quad and ITB stretch RLE   Supine hook lying with lower body rotation for quad/TFL stretch 30 secs 3x 3x each LE* THERE EX:

## 2021-07-23 ENCOUNTER — APPOINTMENT (OUTPATIENT)
Dept: PHYSICAL THERAPY | Age: 61
End: 2021-07-23
Attending: Other
Payer: COMMERCIAL

## 2021-07-27 ENCOUNTER — OFFICE VISIT (OUTPATIENT)
Dept: PHYSICAL THERAPY | Age: 61
End: 2021-07-27
Attending: Other
Payer: COMMERCIAL

## 2021-07-27 PROCEDURE — 97110 THERAPEUTIC EXERCISES: CPT

## 2021-07-27 PROCEDURE — 97140 MANUAL THERAPY 1/> REGIONS: CPT

## 2021-07-27 NOTE — PROGRESS NOTES
Dx: B low back pain with sciatica             Insurance (Authorized # of Visits):  45 visit max 13 used as of 5/16/21           Authorizing Physician: Dr. Emy Luz  Next MD visit: none scheduled  Fall Risk: standard         Precautions: n/a             Subjec TX#: 3/10 Date: 6/25/21                TX#: 4/10 Date: 7/9/21                TX#: 5/10 Date: 7/20/21  Tx#: 6/10 Date: 7/27/21  TX#: 7/10    THERE EX:  SL manual hip flexor, quad and ITB stretch RLE   Supine hook lying with lower body rotation for quad effleurage RLE  MANUAL:  STM ITB/lateral rectus and TFL 8 mins effleurage RLE MANUAL:  STM ITB/lateral rectus and TFL 8 mins effleurage, cross friction and deep pressure  RLE       SELF CARE:  PT educates pt on diet, safe K levels for exercise, red flag si

## 2021-07-30 ENCOUNTER — OFFICE VISIT (OUTPATIENT)
Dept: PHYSICAL THERAPY | Age: 61
End: 2021-07-30
Attending: Other
Payer: COMMERCIAL

## 2021-07-30 PROCEDURE — 97140 MANUAL THERAPY 1/> REGIONS: CPT

## 2021-07-30 PROCEDURE — 97110 THERAPEUTIC EXERCISES: CPT

## 2021-07-30 NOTE — PROGRESS NOTES
Dx: B low back pain with sciatica             Insurance (Authorized # of Visits):  45 visit max 13 used as of 5/16/21           Authorizing Physician: Dr. Aamir Stiles  Next MD visit: none scheduled  Fall Risk: standard         Precautions: n/a             Subjec 7/30/21  TX#: 8/10   THERE EX:   Warm up Nu step level 3 BUE and BLEs 8 mins   Standing gastroc stretch BLEs 30 secs 3x   SL manual hip flexor, quad and ITB stretch RLE   Clams RLE 15x2   Bridge with pelvic floor lift red band for neutral hip posture 10x2 testing.  Pt expresses understanding and agreement      HEP: Denoted with *     Charges: 1 manual 2 ther ex   Total Timed Treatment: 45 min  Total Treatment Time: 45 min

## 2021-08-10 ENCOUNTER — OFFICE VISIT (OUTPATIENT)
Dept: PHYSICAL THERAPY | Age: 61
End: 2021-08-10
Attending: Other
Payer: COMMERCIAL

## 2021-08-10 PROCEDURE — 97110 THERAPEUTIC EXERCISES: CPT

## 2021-08-10 PROCEDURE — 97140 MANUAL THERAPY 1/> REGIONS: CPT

## 2021-08-10 NOTE — PROGRESS NOTES
Dx: B low back pain with sciatica             Insurance (Authorized # of Visits):  45 visit max 13 used as of 5/16/21           Authorizing Physician: Dr. Constance Bhatti  Next MD visit: none scheduled  Fall Risk: standard         Precautions: n/a             Subjec 6/10 Date: 7/27/21  TX#: 7/10 Date: 7/30/21  TX#: 8/10 Date: 8/10/21  TX#: 9/10    THERE EX:   Warm up Nu step level 3 BUE and BLEs 8 mins   Standing gastroc stretch BLEs 30 secs 3x   SL manual hip flexor, quad and ITB stretch RLE   Clams RLE 15x2   Bridge MANUAL:  STM ITB/lateral rectus and TFL 8 mins effleurage RLE  MANUAL:  STM ITB/lateral rectus and TFL 8 mins effleurage RLE MANUAL:  STM ITB/lateral rectus and TFL 8 mins effleurage, cross friction and deep pressure  RLE MANUAL:  STM ITB/lateral rectus

## 2021-08-12 ENCOUNTER — NURSE ONLY (OUTPATIENT)
Dept: FAMILY MEDICINE CLINIC | Facility: CLINIC | Age: 61
End: 2021-08-12
Payer: COMMERCIAL

## 2021-08-12 ENCOUNTER — TELEPHONE (OUTPATIENT)
Dept: FAMILY MEDICINE CLINIC | Facility: CLINIC | Age: 61
End: 2021-08-12

## 2021-08-12 ENCOUNTER — HOSPITAL ENCOUNTER (OUTPATIENT)
Age: 61
Discharge: LEFT BEFORE DISCHARGE | End: 2021-08-12
Payer: COMMERCIAL

## 2021-08-12 DIAGNOSIS — R05.9 COUGH: ICD-10-CM

## 2021-08-12 DIAGNOSIS — R51.9 NONINTRACTABLE HEADACHE, UNSPECIFIED CHRONICITY PATTERN, UNSPECIFIED HEADACHE TYPE: Primary | ICD-10-CM

## 2021-08-12 DIAGNOSIS — R51.9 NONINTRACTABLE HEADACHE, UNSPECIFIED CHRONICITY PATTERN, UNSPECIFIED HEADACHE TYPE: ICD-10-CM

## 2021-08-12 NOTE — TELEPHONE ENCOUNTER
Pt was in the IC said she was told that she could walk in for a covid test- advisd that she can not- she doesn't want to be seen in the IC just wants the covid test.  Advised that we will call the PCP to see if we can get an order for the covid test and th

## 2021-08-12 NOTE — ED INITIAL ASSESSMENT (HPI)
Here for covid testing. Thought was going to be outpt.   Pt moved over to EM[facility] as a nurse visit after [facility] discussing case with dr Miguel Kidd

## 2021-08-13 ENCOUNTER — APPOINTMENT (OUTPATIENT)
Dept: PHYSICAL THERAPY | Age: 61
End: 2021-08-13
Attending: Other
Payer: COMMERCIAL

## 2021-08-14 LAB
SARS CT VALUE: 17.01
SARS-COV-2 RNA RESP QL NAA+PROBE: DETECTED

## 2021-08-16 ENCOUNTER — APPOINTMENT (OUTPATIENT)
Dept: PHYSICAL THERAPY | Age: 61
End: 2021-08-16
Attending: Other
Payer: COMMERCIAL

## 2021-08-16 ENCOUNTER — TELEPHONE (OUTPATIENT)
Dept: FAMILY MEDICINE CLINIC | Facility: CLINIC | Age: 61
End: 2021-08-16

## 2021-08-16 NOTE — TELEPHONE ENCOUNTER
----- Message from Jane Daugherty MD sent at 8/16/2021 12:43 PM CDT -----  [facility] sent to the patient. Lynn Kerline   Your COVID test came back POSITIVE.  QUARANTINE AND ISOLATE PLEASE. YOU SHOULD NOT RETURN TO WORK UNTIL AT LEAST 10 DAYS HAVE PA

## 2021-08-16 NOTE — TELEPHONE ENCOUNTER
Left detailed message to voicemail (per verbal release form consent with confirmed identifying message) regarding test results, and that [facility] sent to pt. Patient advised to refer to [facility] or call office back with any questions/concerns.

## 2021-08-17 ENCOUNTER — TELEPHONE (OUTPATIENT)
Dept: INTERNAL MEDICINE CLINIC | Facility: HOSPITAL | Age: 61
End: 2021-08-17

## 2021-08-17 DIAGNOSIS — U07.1 LAB TEST POSITIVE FOR DETECTION OF COVID-19 VIRUS: Primary | ICD-10-CM

## 2021-08-17 NOTE — TELEPHONE ENCOUNTER
Was asked to contact pt by her PCP: Marcia Holman, for possible PAB infusion. Pt contacted as requested and reports that she is feeling well and is declining the infusion. Dr. Patrice Rebolledo will be informed.

## 2021-08-20 ENCOUNTER — APPOINTMENT (OUTPATIENT)
Dept: PHYSICAL THERAPY | Age: 61
End: 2021-08-20
Attending: Other
Payer: COMMERCIAL

## 2021-08-23 ENCOUNTER — APPOINTMENT (OUTPATIENT)
Dept: PHYSICAL THERAPY | Age: 61
End: 2021-08-23
Attending: Other
Payer: COMMERCIAL

## 2021-08-27 ENCOUNTER — TELEPHONE (OUTPATIENT)
Dept: PHYSICAL THERAPY | Facility: HOSPITAL | Age: 61
End: 2021-08-27

## 2021-08-27 ENCOUNTER — APPOINTMENT (OUTPATIENT)
Dept: PHYSICAL THERAPY | Age: 61
End: 2021-08-27
Attending: Other
Payer: COMMERCIAL

## 2021-08-28 ENCOUNTER — OFFICE VISIT (OUTPATIENT)
Dept: FAMILY MEDICINE CLINIC | Facility: CLINIC | Age: 61
End: 2021-08-28
Payer: COMMERCIAL

## 2021-08-28 VITALS
BODY MASS INDEX: 39 KG/M2 | OXYGEN SATURATION: 100 % | TEMPERATURE: 98 F | HEART RATE: 80 BPM | SYSTOLIC BLOOD PRESSURE: 130 MMHG | RESPIRATION RATE: 18 BRPM | DIASTOLIC BLOOD PRESSURE: 86 MMHG | WEIGHT: 242.25 LBS

## 2021-08-28 DIAGNOSIS — Z86.39 HISTORY OF HYPERKALEMIA: ICD-10-CM

## 2021-08-28 DIAGNOSIS — Z76.89 RETURN TO WORK EXAM: ICD-10-CM

## 2021-08-28 DIAGNOSIS — Z02.79 MEDICAL CERTIFICATE ISSUANCE: Primary | ICD-10-CM

## 2021-08-28 DIAGNOSIS — Z86.16 HISTORY OF COVID-19: ICD-10-CM

## 2021-08-28 PROCEDURE — 3079F DIAST BP 80-89 MM HG: CPT | Performed by: FAMILY MEDICINE

## 2021-08-28 PROCEDURE — 99214 OFFICE O/P EST MOD 30 MIN: CPT | Performed by: FAMILY MEDICINE

## 2021-08-28 PROCEDURE — 3075F SYST BP GE 130 - 139MM HG: CPT | Performed by: FAMILY MEDICINE

## 2021-08-28 RX ORDER — FUROSEMIDE 20 MG/1
20 TABLET ORAL DAILY
Qty: 30 TABLET | Refills: 0 | Status: SHIPPED | OUTPATIENT
Start: 2021-08-28 | End: 2021-09-27

## 2021-08-28 NOTE — PROGRESS NOTES
[facility] Office Note  Chief Complaint:   Patient presents with: Follow - Up      HPI:   This is a 63year old female coming in for f/u lab work. Patient states she would like to be re-tested.      COVID positive on 8/12/2021 aft Use      Smoking status: Former Smoker        Packs/day: 1.00        Years: 30.00        Pack years: 27        Quit date: 2011        Years since quitting: 10.3      Smokeless tobacco: Never Used    Vaping Use      Vaping Use: Never used    Alcohol use: Josse Magallon (36.5 °C) (Temporal)   Resp 18   Wt 242 lb 4 oz (109.9 kg)   LMP 11/05/2010   SpO2 100%   BMI 38.51 kg/m²  Estimated body mass index is 38.51 kg/m² as calculated from the following:    Height as of 7/7/21: 5' 6.5\" (1.689 m).     Weight as of this encounter medical decision-making and tests are ordered as detailed in the plan of care above        Meds & Refills for this Visit:  Requested Prescriptions     Signed Prescriptions Disp Refills   • furosemide 20 MG Oral Tab 30 tablet 0     Sig: Take 1 tablet (20 mg

## 2021-08-30 ENCOUNTER — APPOINTMENT (OUTPATIENT)
Dept: PHYSICAL THERAPY | Age: 61
End: 2021-08-30
Attending: Other
Payer: COMMERCIAL

## 2021-09-03 ENCOUNTER — APPOINTMENT (OUTPATIENT)
Dept: PHYSICAL THERAPY | Age: 61
End: 2021-09-03
Attending: Other
Payer: COMMERCIAL

## 2021-10-02 NOTE — TELEPHONE ENCOUNTER
Dr. Peralta Meter not pcp, still want to fill?     Last refill: 05/25/21  Qty 90  w 0 refills  Last ov: 03/16/21    Requested Prescriptions     Pending Prescriptions Disp Refills   • lisinopril 40 MG Oral Tab 90 tablet 0     Sig: Take 1 tablet (40 mg total) by mustapha

## 2021-10-03 RX ORDER — LISINOPRIL 40 MG/1
40 TABLET ORAL DAILY
Qty: 90 TABLET | Refills: 0 | Status: SHIPPED | OUTPATIENT
Start: 2021-10-03

## 2021-10-07 ENCOUNTER — HOSPITAL ENCOUNTER (OUTPATIENT)
Dept: MAMMOGRAPHY | Facility: HOSPITAL | Age: 61
Discharge: HOME | End: 2021-10-07
Attending: FAMILY MEDICINE
Payer: COMMERCIAL

## 2021-10-07 DIAGNOSIS — Z12.31 ENCOUNTER FOR SCREENING MAMMOGRAM FOR MALIGNANT NEOPLASM OF BREAST: ICD-10-CM

## 2021-10-07 PROCEDURE — 77067 SCR MAMMO BI INCL CAD: CPT | Performed by: FAMILY MEDICINE

## 2021-10-07 PROCEDURE — 77063 BREAST TOMOSYNTHESIS BI: CPT | Performed by: FAMILY MEDICINE

## 2021-10-18 ENCOUNTER — HOSPITAL ENCOUNTER (OUTPATIENT)
Dept: MAMMOGRAPHY | Facility: HOSPITAL | Age: 61
Discharge: HOME | End: 2021-10-18
Attending: FAMILY MEDICINE
Payer: COMMERCIAL

## 2021-10-18 DIAGNOSIS — R92.2 INCONCLUSIVE MAMMOGRAM: ICD-10-CM

## 2021-10-18 PROCEDURE — 76642 ULTRASOUND BREAST LIMITED: CPT | Performed by: FAMILY MEDICINE

## 2021-10-18 PROCEDURE — 77061 BREAST TOMOSYNTHESIS UNI: CPT | Performed by: FAMILY MEDICINE

## 2021-10-18 PROCEDURE — 77065 DX MAMMO INCL CAD UNI: CPT | Performed by: FAMILY MEDICINE

## 2021-10-21 NOTE — TELEPHONE ENCOUNTER
----- Message from Jane You MD sent at 4/23/2021  2:37 PM CDT -----  Please call patient and inform her that her COVID test was negative. 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 Plan: Quality of life issue. Scleral lenses are medically necessary. Make adjustments to scleral lenses.  RTC 1 week

## 2021-11-03 NOTE — TELEPHONE ENCOUNTER
Escitalopram Oxalate (Tab) LEXAPRO 20 MG Oral     lisinopril 40 MG Oral Tab   PLEASE SEND REFILL TO [facility] IN [place]   [place] DOES HAVE AN APPT SCHEDULED FOR December FOR A PHYSICAL, THAT WAS THE ONLY TIME SHE COULD GET IN BEFORE 10AM. show

## 2021-12-13 ENCOUNTER — OFFICE VISIT (OUTPATIENT)
Dept: NEUROLOGY | Facility: CLINIC | Age: 61
End: 2021-12-13
Payer: COMMERCIAL

## 2021-12-13 VITALS
RESPIRATION RATE: 18 BRPM | DIASTOLIC BLOOD PRESSURE: 70 MMHG | BODY MASS INDEX: 38.75 KG/M2 | HEART RATE: 70 BPM | WEIGHT: 244 LBS | SYSTOLIC BLOOD PRESSURE: 130 MMHG | HEIGHT: 66.5 IN

## 2021-12-13 DIAGNOSIS — G43.109 MIGRAINE EQUIVALENT: ICD-10-CM

## 2021-12-13 DIAGNOSIS — M79.605 PAIN IN BOTH LOWER EXTREMITIES: Primary | ICD-10-CM

## 2021-12-13 DIAGNOSIS — R25.1 TREMOR: ICD-10-CM

## 2021-12-13 DIAGNOSIS — M79.604 PAIN IN BOTH LOWER EXTREMITIES: Primary | ICD-10-CM

## 2021-12-13 DIAGNOSIS — R25.1 SHAKES: ICD-10-CM

## 2021-12-13 PROCEDURE — 3075F SYST BP GE 130 - 139MM HG: CPT | Performed by: OTHER

## 2021-12-13 PROCEDURE — 99214 OFFICE O/P EST MOD 30 MIN: CPT | Performed by: OTHER

## 2021-12-13 PROCEDURE — 3078F DIAST BP <80 MM HG: CPT | Performed by: OTHER

## 2021-12-13 PROCEDURE — 3008F BODY MASS INDEX DOCD: CPT | Performed by: OTHER

## 2021-12-13 RX ORDER — FUROSEMIDE 20 MG/1
TABLET ORAL
COMMUNITY
Start: 2021-10-01

## 2021-12-13 NOTE — PROGRESS NOTES
LOV 7/8/21 Tremor/migraine/Pain lower extremities f/u- Patient states tremors & migraines are stable. Patient states pain in BLE is a lot better.

## 2021-12-13 NOTE — PROGRESS NOTES
[place] with [facility]  Neurology   2021    Ofe Bueno Patient Status:  No patient class for patient encounter    3/23/1960 MRN [id number]   Location [unfilled] PCP glasses 2009   • Weight loss Recent       PAST SURGICAL HISTORY:   Past Surgical History:   Procedure Laterality Date   • ADDED FINGER SURGERY     • CHOLECYSTECTOMY  6/1/2013   • COLONOSCOPY  11/29/2014   • FOOT/TOES SURGERY PROC UNLISTED     • KNEE REPLAC developed,  nurished , in no acute distress,   Pink Conjunctiva;   Neck: supple, no bruits;  Cardiac: S1/S2 RRR  Lungs: CTA B/L;  Musculoskeletal: no joint tenderness, others see neuro exam;  Extremities:  no pitting edema, no cyanosis or skin rash,  pulse nerve only of a very mild subtle degree. There is no motor involvement. There is no evidence of diffuse sensorimotor neuropathy or cervical radiculopathy to be the cause of patient's hand shaking. Clinical correlation is indicated.     Dictated By Sina Marques

## 2022-02-18 ENCOUNTER — HOSPITAL ENCOUNTER (OUTPATIENT)
Dept: ULTRASOUND IMAGING | Facility: HOSPITAL | Age: 62
Discharge: HOME | End: 2022-02-18
Attending: ORTHOPAEDIC SURGERY
Payer: COMMERCIAL

## 2022-02-18 DIAGNOSIS — B99.9 INFECTION: ICD-10-CM

## 2022-02-18 DIAGNOSIS — Z96.652 STATUS POST TOTAL LEFT KNEE REPLACEMENT: ICD-10-CM

## 2022-02-18 LAB
BASOPHILS NFR SNV: 0 %
CRYSTALS SNV QL MICRO: NEGATIVE
EOSINOPHIL NFR SNV: 0 %
GRANULOCYTES # SNV AUTO: 1478 /MM3 (ref 0–200)
LYMPHOCYTES NFR SNV: 12 %
MONOS+MACROS NFR SNV: 56 %
NEUTROPHILS NFR SNV: 32 %
RBC # FLD AUTO: 7000 /MM3 (ref ?–1)
TOTAL CELLS COUNTED BLD: 25

## 2022-02-18 PROCEDURE — 89050 BODY FLUID CELL COUNT: CPT | Performed by: ORTHOPAEDIC SURGERY

## 2022-02-18 PROCEDURE — 87205 SMEAR GRAM STAIN: CPT | Performed by: ORTHOPAEDIC SURGERY

## 2022-02-18 PROCEDURE — 20611 DRAIN/INJ JOINT/BURSA W/US: CPT | Performed by: ORTHOPAEDIC SURGERY

## 2022-02-18 PROCEDURE — 87070 CULTURE OTHR SPECIMN AEROBIC: CPT | Performed by: ORTHOPAEDIC SURGERY

## 2022-02-18 PROCEDURE — 89060 EXAM SYNOVIAL FLUID CRYSTALS: CPT | Performed by: ORTHOPAEDIC SURGERY

## 2022-02-18 PROCEDURE — 87075 CULTR BACTERIA EXCEPT BLOOD: CPT | Performed by: ORTHOPAEDIC SURGERY

## 2022-02-21 ENCOUNTER — TELEPHONE (OUTPATIENT)
Dept: FAMILY MEDICINE CLINIC | Facility: CLINIC | Age: 62
End: 2022-02-21

## 2022-02-21 NOTE — TELEPHONE ENCOUNTER
PT CALLED TO HAVE LETTER FOR WORK PRINTED HERE INSTEAD OF [place], SHE LIVES IN [place].     JUST MARTINA

## 2022-03-21 RX ORDER — OXYBUTYNIN CHLORIDE 10 MG/1
TABLET, EXTENDED RELEASE ORAL
Qty: 90 TABLET | Refills: 0 | Status: SHIPPED | OUTPATIENT
Start: 2022-03-21

## 2022-03-21 NOTE — TELEPHONE ENCOUNTER
Last OV 8/28/21  Last refilled 11-  #90  3 refills     Patient confirmed she is taking medication    Routed to MM to advise

## 2022-04-07 ENCOUNTER — OFFICE VISIT (OUTPATIENT)
Dept: INTERNAL MEDICINE CLINIC | Facility: CLINIC | Age: 62
End: 2022-04-07
Payer: COMMERCIAL

## 2022-04-07 VITALS
SYSTOLIC BLOOD PRESSURE: 170 MMHG | BODY MASS INDEX: 40.66 KG/M2 | DIASTOLIC BLOOD PRESSURE: 80 MMHG | HEART RATE: 76 BPM | WEIGHT: 253 LBS | OXYGEN SATURATION: 98 % | HEIGHT: 66 IN

## 2022-04-07 DIAGNOSIS — Z00.00 PHYSICAL EXAM: Primary | ICD-10-CM

## 2022-04-07 DIAGNOSIS — I10 PRIMARY HYPERTENSION: ICD-10-CM

## 2022-04-07 DIAGNOSIS — R01.1 MURMUR: ICD-10-CM

## 2022-04-07 DIAGNOSIS — Z82.49 FAMILY HISTORY OF HEART DISEASE: ICD-10-CM

## 2022-04-07 DIAGNOSIS — R73.03 PREDIABETES: ICD-10-CM

## 2022-04-07 DIAGNOSIS — E78.00 HYPERCHOLESTEREMIA: ICD-10-CM

## 2022-04-07 PROCEDURE — 3008F BODY MASS INDEX DOCD: CPT | Performed by: FAMILY MEDICINE

## 2022-04-07 PROCEDURE — 3077F SYST BP >= 140 MM HG: CPT | Performed by: FAMILY MEDICINE

## 2022-04-07 PROCEDURE — 3079F DIAST BP 80-89 MM HG: CPT | Performed by: FAMILY MEDICINE

## 2022-04-07 PROCEDURE — 93000 ELECTROCARDIOGRAM COMPLETE: CPT | Performed by: FAMILY MEDICINE

## 2022-04-07 PROCEDURE — 99214 OFFICE O/P EST MOD 30 MIN: CPT | Performed by: FAMILY MEDICINE

## 2022-04-07 RX ORDER — MULTIVITAMIN
TABLET ORAL
COMMUNITY

## 2022-04-07 RX ORDER — AMLODIPINE BESYLATE 5 MG/1
5 TABLET ORAL DAILY
Qty: 90 TABLET | Refills: 0 | Status: SHIPPED | OUTPATIENT
Start: 2022-04-07 | End: 2023-04-02

## 2022-04-07 RX ORDER — METOPROLOL SUCCINATE 50 MG/1
50 TABLET, EXTENDED RELEASE ORAL DAILY
COMMUNITY

## 2022-04-10 ENCOUNTER — HOSPITAL ENCOUNTER (EMERGENCY)
Facility: HOSPITAL | Age: 62
Discharge: HOME | End: 2022-04-10
Attending: EMERGENCY MEDICINE
Payer: COMMERCIAL

## 2022-04-10 ENCOUNTER — APPOINTMENT (OUTPATIENT)
Dept: GENERAL RADIOLOGY | Facility: HOSPITAL | Age: 62
End: 2022-04-10
Attending: EMERGENCY MEDICINE
Payer: COMMERCIAL

## 2022-04-10 VITALS
BODY MASS INDEX: 40 KG/M2 | WEIGHT: 250 LBS | RESPIRATION RATE: 17 BRPM | TEMPERATURE: 99 F | HEART RATE: 65 BPM | SYSTOLIC BLOOD PRESSURE: 131 MMHG | OXYGEN SATURATION: 94 % | DIASTOLIC BLOOD PRESSURE: 51 MMHG

## 2022-04-10 DIAGNOSIS — R03.0 ELEVATED BLOOD PRESSURE READING: Primary | ICD-10-CM

## 2022-04-10 LAB
ALBUMIN SERPL-MCNC: 3.6 G/DL (ref 3.4–5)
ALBUMIN/GLOB SERPL: 0.9 {RATIO} (ref 1–2)
ALP LIVER SERPL-CCNC: 112 U/L
ALT SERPL-CCNC: 34 U/L
ANION GAP SERPL CALC-SCNC: 7 MMOL/L (ref 0–18)
AST SERPL-CCNC: 14 U/L (ref 15–37)
ATRIAL RATE: 76 BPM
BASOPHILS # BLD AUTO: 0.06 X10(3) UL (ref 0–0.2)
BASOPHILS NFR BLD AUTO: 0.6 %
BILIRUB SERPL-MCNC: 0.4 MG/DL (ref 0.1–2)
BUN BLD-MCNC: 29 MG/DL (ref 7–18)
CALCIUM BLD-MCNC: 9.6 MG/DL (ref 8.5–10.1)
CHLORIDE SERPL-SCNC: 105 MMOL/L (ref 98–112)
CO2 SERPL-SCNC: 29 MMOL/L (ref 21–32)
CREAT BLD-MCNC: 0.9 MG/DL
EOSINOPHIL # BLD AUTO: 0.14 X10(3) UL (ref 0–0.7)
EOSINOPHIL NFR BLD AUTO: 1.4 %
ERYTHROCYTE [DISTWIDTH] IN BLOOD BY AUTOMATED COUNT: 11.8 %
GLOBULIN PLAS-MCNC: 4 G/DL (ref 2.8–4.4)
GLUCOSE BLD-MCNC: 115 MG/DL (ref 70–99)
HCT VFR BLD AUTO: 41.5 %
HGB BLD-MCNC: 13.7 G/DL
IMM GRANULOCYTES # BLD AUTO: 0.04 X10(3) UL (ref 0–1)
IMM GRANULOCYTES NFR BLD: 0.4 %
LYMPHOCYTES # BLD AUTO: 2.69 X10(3) UL (ref 1–4)
LYMPHOCYTES NFR BLD AUTO: 27.3 %
MCH RBC QN AUTO: 30.3 PG (ref 26–34)
MCHC RBC AUTO-ENTMCNC: 33 G/DL (ref 31–37)
MCV RBC AUTO: 91.8 FL
MONOCYTES # BLD AUTO: 0.75 X10(3) UL (ref 0.1–1)
MONOCYTES NFR BLD AUTO: 7.6 %
NEUTROPHILS # BLD AUTO: 6.18 X10 (3) UL (ref 1.5–7.7)
NEUTROPHILS # BLD AUTO: 6.18 X10(3) UL (ref 1.5–7.7)
NEUTROPHILS NFR BLD AUTO: 62.7 %
OSMOLALITY SERPL CALC.SUM OF ELEC: 299 MOSM/KG (ref 275–295)
P AXIS: 52 DEGREES
P-R INTERVAL: 152 MS
PLATELET # BLD AUTO: 327 10(3)UL (ref 150–450)
POTASSIUM SERPL-SCNC: 3.9 MMOL/L (ref 3.5–5.1)
PROT SERPL-MCNC: 7.6 G/DL (ref 6.4–8.2)
Q-T INTERVAL: 406 MS
QRS DURATION: 96 MS
QTC CALCULATION (BEZET): 456 MS
R AXIS: 5 DEGREES
RBC # BLD AUTO: 4.52 X10(6)UL
SODIUM SERPL-SCNC: 141 MMOL/L (ref 136–145)
T AXIS: 43 DEGREES
TROPONIN I HIGH SENSITIVITY: 7 NG/L
VENTRICULAR RATE: 76 BPM
WBC # BLD AUTO: 9.9 X10(3) UL (ref 4–11)

## 2022-04-10 PROCEDURE — 99284 EMERGENCY DEPT VISIT MOD MDM: CPT

## 2022-04-10 PROCEDURE — 85025 COMPLETE CBC W/AUTO DIFF WBC: CPT | Performed by: EMERGENCY MEDICINE

## 2022-04-10 PROCEDURE — 93005 ELECTROCARDIOGRAM TRACING: CPT

## 2022-04-10 PROCEDURE — 93010 ELECTROCARDIOGRAM REPORT: CPT

## 2022-04-10 PROCEDURE — 71045 X-RAY EXAM CHEST 1 VIEW: CPT | Performed by: EMERGENCY MEDICINE

## 2022-04-10 PROCEDURE — 36415 COLL VENOUS BLD VENIPUNCTURE: CPT

## 2022-04-10 PROCEDURE — 84484 ASSAY OF TROPONIN QUANT: CPT | Performed by: EMERGENCY MEDICINE

## 2022-04-10 PROCEDURE — 80053 COMPREHEN METABOLIC PANEL: CPT | Performed by: EMERGENCY MEDICINE

## 2022-04-14 ENCOUNTER — LAB ENCOUNTER (OUTPATIENT)
Dept: LAB | Age: 62
End: 2022-04-14
Attending: FAMILY MEDICINE
Payer: COMMERCIAL

## 2022-04-14 DIAGNOSIS — Z96.652 STATUS POST TOTAL LEFT KNEE REPLACEMENT: ICD-10-CM

## 2022-04-14 DIAGNOSIS — Z00.00 PHYSICAL EXAM: ICD-10-CM

## 2022-04-14 DIAGNOSIS — R73.03 PREDIABETES: ICD-10-CM

## 2022-04-14 DIAGNOSIS — B99.9 INFECTION: ICD-10-CM

## 2022-04-14 LAB
ALBUMIN SERPL-MCNC: 3.3 G/DL (ref 3.4–5)
ALBUMIN/GLOB SERPL: 1 {RATIO} (ref 1–2)
ALP LIVER SERPL-CCNC: 101 U/L
ALT SERPL-CCNC: 26 U/L
ANION GAP SERPL CALC-SCNC: 5 MMOL/L (ref 0–18)
AST SERPL-CCNC: 13 U/L (ref 15–37)
BASOPHILS # BLD AUTO: 0.05 X10(3) UL (ref 0–0.2)
BASOPHILS NFR BLD AUTO: 0.9 %
BILIRUB SERPL-MCNC: 0.6 MG/DL (ref 0.1–2)
BUN BLD-MCNC: 23 MG/DL (ref 7–18)
CALCIUM BLD-MCNC: 9.4 MG/DL (ref 8.5–10.1)
CHLORIDE SERPL-SCNC: 108 MMOL/L (ref 98–112)
CHOLEST SERPL-MCNC: 173 MG/DL (ref ?–200)
CO2 SERPL-SCNC: 29 MMOL/L (ref 21–32)
CREAT BLD-MCNC: 0.73 MG/DL
EOSINOPHIL # BLD AUTO: 0.08 X10(3) UL (ref 0–0.7)
EOSINOPHIL NFR BLD AUTO: 1.4 %
ERYTHROCYTE [DISTWIDTH] IN BLOOD BY AUTOMATED COUNT: 11.7 %
EST. AVERAGE GLUCOSE BLD GHB EST-MCNC: 120 MG/DL (ref 68–126)
FASTING PATIENT LIPID ANSWER: YES
FASTING STATUS PATIENT QL REPORTED: YES
GLOBULIN PLAS-MCNC: 3.2 G/DL (ref 2.8–4.4)
GLUCOSE BLD-MCNC: 91 MG/DL (ref 70–99)
HBA1C MFR BLD: 5.8 % (ref ?–5.7)
HCT VFR BLD AUTO: 39.4 %
HDLC SERPL-MCNC: 63 MG/DL (ref 40–59)
HGB BLD-MCNC: 12.7 G/DL
IMM GRANULOCYTES # BLD AUTO: 0.02 X10(3) UL (ref 0–1)
IMM GRANULOCYTES NFR BLD: 0.3 %
LDLC SERPL CALC-MCNC: 90 MG/DL (ref ?–100)
LYMPHOCYTES # BLD AUTO: 1.83 X10(3) UL (ref 1–4)
LYMPHOCYTES NFR BLD AUTO: 31.5 %
MCH RBC QN AUTO: 29.8 PG (ref 26–34)
MCHC RBC AUTO-ENTMCNC: 32.2 G/DL (ref 31–37)
MCV RBC AUTO: 92.5 FL
MONOCYTES # BLD AUTO: 0.48 X10(3) UL (ref 0.1–1)
MONOCYTES NFR BLD AUTO: 8.3 %
NEUTROPHILS # BLD AUTO: 3.35 X10 (3) UL (ref 1.5–7.7)
NEUTROPHILS # BLD AUTO: 3.35 X10(3) UL (ref 1.5–7.7)
NEUTROPHILS NFR BLD AUTO: 57.6 %
NONHDLC SERPL-MCNC: 110 MG/DL (ref ?–130)
OSMOLALITY SERPL CALC.SUM OF ELEC: 297 MOSM/KG (ref 275–295)
PLATELET # BLD AUTO: 336 10(3)UL (ref 150–450)
POTASSIUM SERPL-SCNC: 4.9 MMOL/L (ref 3.5–5.1)
PROT SERPL-MCNC: 6.5 G/DL (ref 6.4–8.2)
RBC # BLD AUTO: 4.26 X10(6)UL
SODIUM SERPL-SCNC: 142 MMOL/L (ref 136–145)
TRIGL SERPL-MCNC: 115 MG/DL (ref 30–149)
TSI SER-ACNC: 3.4 MIU/ML (ref 0.36–3.74)
VLDLC SERPL CALC-MCNC: 19 MG/DL (ref 0–30)
WBC # BLD AUTO: 5.8 X10(3) UL (ref 4–11)

## 2022-04-14 PROCEDURE — 80053 COMPREHEN METABOLIC PANEL: CPT

## 2022-04-14 PROCEDURE — 83036 HEMOGLOBIN GLYCOSYLATED A1C: CPT

## 2022-04-14 PROCEDURE — 36415 COLL VENOUS BLD VENIPUNCTURE: CPT

## 2022-04-14 PROCEDURE — 85025 COMPLETE CBC W/AUTO DIFF WBC: CPT

## 2022-04-14 PROCEDURE — 80061 LIPID PANEL: CPT

## 2022-04-14 PROCEDURE — 84443 ASSAY THYROID STIM HORMONE: CPT

## 2022-04-25 ENCOUNTER — APPOINTMENT (OUTPATIENT)
Dept: GENERAL RADIOLOGY | Age: 62
End: 2022-04-25
Payer: COMMERCIAL

## 2022-04-25 ENCOUNTER — APPOINTMENT (OUTPATIENT)
Dept: ULTRASOUND IMAGING | Age: 62
End: 2022-04-25
Payer: COMMERCIAL

## 2022-04-25 ENCOUNTER — APPOINTMENT (OUTPATIENT)
Dept: CT IMAGING | Age: 62
End: 2022-04-25
Attending: EMERGENCY MEDICINE
Payer: COMMERCIAL

## 2022-04-25 ENCOUNTER — HOSPITAL ENCOUNTER (EMERGENCY)
Age: 62
Discharge: HOME | End: 2022-04-25
Attending: EMERGENCY MEDICINE
Payer: COMMERCIAL

## 2022-04-25 ENCOUNTER — TELEPHONE (OUTPATIENT)
Dept: INTERNAL MEDICINE CLINIC | Facility: CLINIC | Age: 62
End: 2022-04-25

## 2022-04-25 VITALS
WEIGHT: 250 LBS | OXYGEN SATURATION: 99 % | HEIGHT: 66 IN | HEART RATE: 62 BPM | BODY MASS INDEX: 40.18 KG/M2 | SYSTOLIC BLOOD PRESSURE: 134 MMHG | DIASTOLIC BLOOD PRESSURE: 50 MMHG | RESPIRATION RATE: 16 BRPM | TEMPERATURE: 98 F

## 2022-04-25 DIAGNOSIS — L03.116 CELLULITIS OF LEFT LOWER EXTREMITY: ICD-10-CM

## 2022-04-25 DIAGNOSIS — R00.2 PALPITATIONS: Primary | ICD-10-CM

## 2022-04-25 LAB
ALBUMIN SERPL-MCNC: 3.4 G/DL (ref 3.4–5)
ALBUMIN/GLOB SERPL: 0.9 {RATIO} (ref 1–2)
ALP LIVER SERPL-CCNC: 111 U/L
ALT SERPL-CCNC: 37 U/L
ANION GAP SERPL CALC-SCNC: 2 MMOL/L (ref 0–18)
AST SERPL-CCNC: 21 U/L (ref 15–37)
ATRIAL RATE: 66 BPM
BASOPHILS # BLD AUTO: 0.05 X10(3) UL (ref 0–0.2)
BASOPHILS NFR BLD AUTO: 0.7 %
BILIRUB SERPL-MCNC: 0.4 MG/DL (ref 0.1–2)
BUN BLD-MCNC: 20 MG/DL (ref 7–18)
CALCIUM BLD-MCNC: 9.3 MG/DL (ref 8.5–10.1)
CHLORIDE SERPL-SCNC: 106 MMOL/L (ref 98–112)
CO2 SERPL-SCNC: 30 MMOL/L (ref 21–32)
CREAT BLD-MCNC: 0.8 MG/DL
D DIMER PPP FEU-MCNC: 0.82 UG/ML FEU (ref ?–0.6)
EOSINOPHIL # BLD AUTO: 0.1 X10(3) UL (ref 0–0.7)
EOSINOPHIL NFR BLD AUTO: 1.3 %
ERYTHROCYTE [DISTWIDTH] IN BLOOD BY AUTOMATED COUNT: 11.9 %
GLOBULIN PLAS-MCNC: 4 G/DL (ref 2.8–4.4)
GLUCOSE BLD-MCNC: 99 MG/DL (ref 70–99)
HCT VFR BLD AUTO: 41.1 %
HGB BLD-MCNC: 13.6 G/DL
IMM GRANULOCYTES # BLD AUTO: 0.02 X10(3) UL (ref 0–1)
IMM GRANULOCYTES NFR BLD: 0.3 %
LYMPHOCYTES # BLD AUTO: 2.31 X10(3) UL (ref 1–4)
LYMPHOCYTES NFR BLD AUTO: 31.1 %
MCH RBC QN AUTO: 30 PG (ref 26–34)
MCHC RBC AUTO-ENTMCNC: 33.1 G/DL (ref 31–37)
MCV RBC AUTO: 90.7 FL
MONOCYTES # BLD AUTO: 0.74 X10(3) UL (ref 0.1–1)
MONOCYTES NFR BLD AUTO: 10 %
NEUTROPHILS # BLD AUTO: 4.2 X10 (3) UL (ref 1.5–7.7)
NEUTROPHILS # BLD AUTO: 4.2 X10(3) UL (ref 1.5–7.7)
NEUTROPHILS NFR BLD AUTO: 56.6 %
NT-PROBNP SERPL-MCNC: 99 PG/ML (ref ?–125)
OSMOLALITY SERPL CALC.SUM OF ELEC: 289 MOSM/KG (ref 275–295)
P AXIS: 32 DEGREES
P-R INTERVAL: 142 MS
PLATELET # BLD AUTO: 298 10(3)UL (ref 150–450)
POTASSIUM SERPL-SCNC: 4.9 MMOL/L (ref 3.5–5.1)
PROT SERPL-MCNC: 7.4 G/DL (ref 6.4–8.2)
Q-T INTERVAL: 406 MS
QRS DURATION: 96 MS
QTC CALCULATION (BEZET): 425 MS
R AXIS: 7 DEGREES
RBC # BLD AUTO: 4.53 X10(6)UL
SODIUM SERPL-SCNC: 138 MMOL/L (ref 136–145)
T AXIS: 47 DEGREES
TROPONIN I HIGH SENSITIVITY: 7 NG/L
VENTRICULAR RATE: 66 BPM
WBC # BLD AUTO: 7.4 X10(3) UL (ref 4–11)

## 2022-04-25 PROCEDURE — 83880 ASSAY OF NATRIURETIC PEPTIDE: CPT | Performed by: EMERGENCY MEDICINE

## 2022-04-25 PROCEDURE — 93971 EXTREMITY STUDY: CPT | Performed by: EMERGENCY MEDICINE

## 2022-04-25 PROCEDURE — 80053 COMPREHEN METABOLIC PANEL: CPT

## 2022-04-25 PROCEDURE — 99285 EMERGENCY DEPT VISIT HI MDM: CPT

## 2022-04-25 PROCEDURE — 85379 FIBRIN DEGRADATION QUANT: CPT | Performed by: EMERGENCY MEDICINE

## 2022-04-25 PROCEDURE — 80053 COMPREHEN METABOLIC PANEL: CPT | Performed by: EMERGENCY MEDICINE

## 2022-04-25 PROCEDURE — 71045 X-RAY EXAM CHEST 1 VIEW: CPT | Performed by: EMERGENCY MEDICINE

## 2022-04-25 PROCEDURE — 84484 ASSAY OF TROPONIN QUANT: CPT | Performed by: EMERGENCY MEDICINE

## 2022-04-25 PROCEDURE — 36415 COLL VENOUS BLD VENIPUNCTURE: CPT

## 2022-04-25 PROCEDURE — 71275 CT ANGIOGRAPHY CHEST: CPT | Performed by: EMERGENCY MEDICINE

## 2022-04-25 PROCEDURE — 85025 COMPLETE CBC W/AUTO DIFF WBC: CPT

## 2022-04-25 PROCEDURE — 85025 COMPLETE CBC W/AUTO DIFF WBC: CPT | Performed by: EMERGENCY MEDICINE

## 2022-04-25 PROCEDURE — 93005 ELECTROCARDIOGRAM TRACING: CPT

## 2022-04-25 PROCEDURE — 84484 ASSAY OF TROPONIN QUANT: CPT

## 2022-04-25 PROCEDURE — 99284 EMERGENCY DEPT VISIT MOD MDM: CPT

## 2022-04-25 PROCEDURE — 93010 ELECTROCARDIOGRAM REPORT: CPT

## 2022-04-25 RX ORDER — CLINDAMYCIN HYDROCHLORIDE 300 MG/1
300 CAPSULE ORAL 3 TIMES DAILY
Qty: 30 CAPSULE | Refills: 0 | Status: SHIPPED | OUTPATIENT
Start: 2022-04-25 | End: 2022-05-05

## 2022-04-25 RX ORDER — IOHEXOL 350 MG/ML
100 INJECTION, SOLUTION INTRAVENOUS
Status: COMPLETED | OUTPATIENT
Start: 2022-04-25 | End: 2022-04-25

## 2022-04-25 NOTE — ED INITIAL ASSESSMENT (HPI)
Reports recently started new medication and has had intermittent chest heaviness and racing. Was seen last week at [place] [id number] for same. Sx continue.   States since Friday with left lower leg swelling

## 2022-04-25 NOTE — TELEPHONE ENCOUNTER
Requested patient go to the nearest Immediate [facility]). Rapid Heart rate/palpitations is not a common, but possible side effect of Amlodipine. The swollen calf is of concern and needs to be checked for possible blood clot. Patient agrees and will go asap.

## 2022-04-25 NOTE — TELEPHONE ENCOUNTER
Patient calling with concerns about the new medication DR. Shalini Monahan put her on amlodipine 5 MG Oral Tab patient states she is having a racing heart and that her calf is really swollen. She did reach out to Dr. Shalini Monahan on [place] and uploaded some pictures of her leg so  Could see she also took a water pill last night to see if that would help.

## 2022-04-27 ENCOUNTER — TELEPHONE (OUTPATIENT)
Dept: INTERNAL MEDICINE CLINIC | Facility: CLINIC | Age: 62
End: 2022-04-27

## 2022-04-27 ENCOUNTER — TELEMEDICINE (OUTPATIENT)
Dept: INTERNAL MEDICINE CLINIC | Facility: CLINIC | Age: 62
End: 2022-04-27

## 2022-04-27 DIAGNOSIS — L03.116 CELLULITIS OF LEFT LOWER EXTREMITY: ICD-10-CM

## 2022-04-27 DIAGNOSIS — R00.0 RAPID HEART RATE: ICD-10-CM

## 2022-04-27 DIAGNOSIS — I10 PRIMARY HYPERTENSION: Primary | ICD-10-CM

## 2022-04-27 PROCEDURE — 99213 OFFICE O/P EST LOW 20 MIN: CPT | Performed by: FAMILY MEDICINE

## 2022-04-27 NOTE — TELEPHONE ENCOUNTER
Does she have a specific ? If she has many ?s I think best to set up ER f/u.  I can add in Monday at lunch

## 2022-04-27 NOTE — TELEPHONE ENCOUNTER
Patient went to ER dept in [place]. Due to leg swelling and and heart palpitations. Was treated with antibiotics for Cellulitis. Was also referred to a cardiologist but patient would like to speak with Dr. Christeen Gosselin. Patient says all her testing and information from ER visit is in her [facility].

## 2022-04-27 NOTE — TELEPHONE ENCOUNTER
Still has palpitations and cellulitis. 1)Concerned about the Amlodipine since the palpatations started right after she began taking it. She was told, while in ER, that she 'just needs to get used to it (amlodipine)'    2) Requesting a letter to release her from work, at least until she sees cardiologist on May 4th. Unable to keep leg in a dependent position for long before swelling increases along with pain. Works nights at [employer] and is on her feet throughout the shift using a . Informed patient that she would need a visit in order for MD to sign off on a letter. Made VIDEO call for ER follow-up today with Dr. Tejal Capps. Note regarding Amlodipine:  From MEDLINE: Some side effects can be serious. If you experience any of these symptoms, call your doctor immediately or get emergency medical treatment:    -more frequent or more severe chest pain    -rapid, pounding, or irregular heartbeat    -fainting  If you experience a serious side effect, you or your doctor may send a report to the Food and Drug Administration's (FDA) MedWatch Adverse Event Reporting program online (http://www.[employer] [place].Clickability/) or by phone (9-520.474.6235).

## 2022-04-29 ENCOUNTER — TELEPHONE (OUTPATIENT)
Dept: INTERNAL MEDICINE CLINIC | Facility: CLINIC | Age: 62
End: 2022-04-29

## 2022-04-29 NOTE — TELEPHONE ENCOUNTER
Pt had a video visit with Dr. Sherren Emperor on 4/27, pt was given an off work note but pt would like to know if the off work note can state she was excused from work from 4/25 - 5/5.

## 2022-05-23 ENCOUNTER — OFFICE VISIT (OUTPATIENT)
Dept: INTERNAL MEDICINE CLINIC | Facility: CLINIC | Age: 62
End: 2022-05-23
Payer: COMMERCIAL

## 2022-05-23 VITALS
HEIGHT: 66 IN | OXYGEN SATURATION: 99 % | SYSTOLIC BLOOD PRESSURE: 138 MMHG | BODY MASS INDEX: 40.34 KG/M2 | WEIGHT: 251 LBS | DIASTOLIC BLOOD PRESSURE: 66 MMHG | HEART RATE: 68 BPM

## 2022-05-23 DIAGNOSIS — L03.116 CELLULITIS OF LEFT LOWER EXTREMITY: ICD-10-CM

## 2022-05-23 DIAGNOSIS — R73.03 PREDIABETES: ICD-10-CM

## 2022-05-23 DIAGNOSIS — I10 PRIMARY HYPERTENSION: Primary | ICD-10-CM

## 2022-05-23 PROBLEM — E66.01 MORBID (SEVERE) OBESITY DUE TO EXCESS CALORIES (HCC): Status: ACTIVE | Noted: 2022-05-23

## 2022-05-23 PROCEDURE — 3078F DIAST BP <80 MM HG: CPT | Performed by: FAMILY MEDICINE

## 2022-05-23 PROCEDURE — 99214 OFFICE O/P EST MOD 30 MIN: CPT | Performed by: FAMILY MEDICINE

## 2022-05-23 PROCEDURE — 3008F BODY MASS INDEX DOCD: CPT | Performed by: FAMILY MEDICINE

## 2022-05-23 PROCEDURE — 3075F SYST BP GE 130 - 139MM HG: CPT | Performed by: FAMILY MEDICINE

## 2022-05-23 RX ORDER — LOSARTAN POTASSIUM 25 MG/1
25 TABLET ORAL DAILY
COMMUNITY
Start: 2022-05-04

## 2022-07-05 NOTE — TELEPHONE ENCOUNTER
Cholesterol Medication Protocol Passed 07/04/2022 08:49 AM   Protocol Details  ALT < 80    ALT resulted within past year    Lipid panel within past 12 months    Appointment within past 12 or next 3 months

## 2022-07-06 RX ORDER — ATORVASTATIN CALCIUM 40 MG/1
40 TABLET, FILM COATED ORAL NIGHTLY
Qty: 90 TABLET | Refills: 3 | OUTPATIENT
Start: 2022-07-06

## 2022-07-06 RX ORDER — ATORVASTATIN CALCIUM 40 MG/1
TABLET, FILM COATED ORAL
Qty: 90 TABLET | Refills: 0 | Status: SHIPPED | OUTPATIENT
Start: 2022-07-06

## 2022-08-01 RX ORDER — OXYBUTYNIN CHLORIDE 10 MG/1
10 TABLET, EXTENDED RELEASE ORAL DAILY
Qty: 90 TABLET | Refills: 0 | OUTPATIENT
Start: 2022-08-01

## 2022-08-03 ENCOUNTER — PATIENT MESSAGE (OUTPATIENT)
Dept: INTERNAL MEDICINE CLINIC | Facility: CLINIC | Age: 62
End: 2022-08-03

## 2022-08-03 RX ORDER — OXYBUTYNIN CHLORIDE 10 MG/1
10 TABLET, EXTENDED RELEASE ORAL DAILY
Qty: 90 TABLET | Refills: 0 | Status: SHIPPED | OUTPATIENT
Start: 2022-08-03

## 2022-08-03 RX ORDER — OXYBUTYNIN CHLORIDE 10 MG/1
10 TABLET, EXTENDED RELEASE ORAL DAILY
Qty: 90 TABLET | Refills: 0 | Status: CANCELLED | OUTPATIENT
Start: 2022-08-03

## 2022-08-03 NOTE — TELEPHONE ENCOUNTER
From: Aliyah Shaikh  To: Amalia Solo MD  Sent: 8/3/2022 3:54 PM CDT  Subject: OXYBUTYNIN REFILL    Hello I have requested a refill on my My Chart. the request keeps on getting denied. The request is going to Dr. Dimple Cr office. Since you are my new primary can you please send in a new prescription for this medication for me. Starr Porter . Thank you.    Ana Buchanan

## 2022-08-05 RX ORDER — OXYBUTYNIN CHLORIDE 10 MG/1
TABLET, EXTENDED RELEASE ORAL
Qty: 90 TABLET | Refills: 0 | OUTPATIENT
Start: 2022-08-05

## 2022-10-17 ENCOUNTER — HOSPITAL ENCOUNTER (OUTPATIENT)
Dept: MAMMOGRAPHY | Age: 62
Discharge: HOME | End: 2022-10-17
Attending: FAMILY MEDICINE
Payer: COMMERCIAL

## 2022-10-17 DIAGNOSIS — Z12.31 ENCOUNTER FOR SCREENING MAMMOGRAM FOR MALIGNANT NEOPLASM OF BREAST: ICD-10-CM

## 2022-10-17 PROCEDURE — 77067 SCR MAMMO BI INCL CAD: CPT | Performed by: FAMILY MEDICINE

## 2022-10-17 PROCEDURE — 77063 BREAST TOMOSYNTHESIS BI: CPT | Performed by: FAMILY MEDICINE

## 2022-10-24 RX ORDER — ATORVASTATIN CALCIUM 40 MG/1
TABLET, FILM COATED ORAL
Qty: 90 TABLET | Refills: 0 | Status: SHIPPED | OUTPATIENT
Start: 2022-10-24

## 2022-11-10 ENCOUNTER — OFFICE VISIT (OUTPATIENT)
Dept: INTERNAL MEDICINE CLINIC | Facility: CLINIC | Age: 62
End: 2022-11-10
Payer: COMMERCIAL

## 2022-11-10 VITALS
DIASTOLIC BLOOD PRESSURE: 82 MMHG | HEIGHT: 66 IN | WEIGHT: 251 LBS | HEART RATE: 78 BPM | BODY MASS INDEX: 40.34 KG/M2 | SYSTOLIC BLOOD PRESSURE: 128 MMHG | OXYGEN SATURATION: 98 %

## 2022-11-10 DIAGNOSIS — E66.9 OBESITY (BMI 30-39.9): ICD-10-CM

## 2022-11-10 DIAGNOSIS — E78.00 HYPERCHOLESTEREMIA: ICD-10-CM

## 2022-11-10 DIAGNOSIS — I10 PRIMARY HYPERTENSION: ICD-10-CM

## 2022-11-10 DIAGNOSIS — N32.81 OAB (OVERACTIVE BLADDER): ICD-10-CM

## 2022-11-10 DIAGNOSIS — Z87.891 HISTORY OF CIGARETTE SMOKING: ICD-10-CM

## 2022-11-10 DIAGNOSIS — Z00.00 PHYSICAL EXAM: Primary | ICD-10-CM

## 2022-11-10 PROCEDURE — 3074F SYST BP LT 130 MM HG: CPT | Performed by: FAMILY MEDICINE

## 2022-11-10 PROCEDURE — 3008F BODY MASS INDEX DOCD: CPT | Performed by: FAMILY MEDICINE

## 2022-11-10 PROCEDURE — 3079F DIAST BP 80-89 MM HG: CPT | Performed by: FAMILY MEDICINE

## 2022-11-10 PROCEDURE — 99396 PREV VISIT EST AGE 40-64: CPT | Performed by: FAMILY MEDICINE

## 2022-11-10 RX ORDER — OXYBUTYNIN CHLORIDE 10 MG/1
10 TABLET, EXTENDED RELEASE ORAL DAILY
Qty: 90 TABLET | Refills: 3 | Status: SHIPPED | OUTPATIENT
Start: 2022-11-10

## 2022-11-10 RX ORDER — ATORVASTATIN CALCIUM 40 MG/1
40 TABLET, FILM COATED ORAL NIGHTLY
Qty: 90 TABLET | Refills: 3 | Status: SHIPPED | OUTPATIENT
Start: 2022-11-10

## 2022-11-10 NOTE — PATIENT INSTRUCTIONS
Recommendations on weight loss:  - Drink 8 glasses of water a day  - Do not drink your calories ( no regular pop, juice, high calorie coffee drinks, limit alcohol)  - Eat high protein meals and snacks  - Don't deprive yourself of food you like, just limit amount and make smart choices  - Write down everything you eat right away and think about why you are eating ( are you hungry, or are you eating because you are bored, tired, etc)  - Do not eat late at night  - Eat Breakfast  - Exercise- aim for 30 minutes 5 times a week of cardiac activity. Also strength training with light weights is helpful  - Weight yourself once a week first thing in the morning    Food advice:    1. Cut down your sugar consumption  2. Cut down refined grains/ carbs  3. Eat a good amount of protein and natural fats ( lean meats/avocado)  4. Increase natural fiber ( fruits/veggies)    Use carrie LOSE IT or MY FITNESS PAL    Also consider weight watchers    MARGARITO Hernandez [employer] resource: dietLucky Sort    Good books:    The Fisher Diet Solution ( helps with tips to stay motivated)  The Obesity Code and The Complete Guide to Intermittent Fasting - both about fasting and by Dr. Renata Vo    Think about PLANNING WHAT YOUR EAT, EATING PROTEIN AND PLANT BASED

## 2022-12-21 ENCOUNTER — TELEMEDICINE (OUTPATIENT)
Dept: INTERNAL MEDICINE CLINIC | Facility: CLINIC | Age: 62
End: 2022-12-21

## 2022-12-21 DIAGNOSIS — F32.A DEPRESSION, UNSPECIFIED DEPRESSION TYPE: Primary | ICD-10-CM

## 2022-12-21 PROCEDURE — 99213 OFFICE O/P EST LOW 20 MIN: CPT | Performed by: FAMILY MEDICINE

## 2022-12-21 RX ORDER — ESCITALOPRAM OXALATE 10 MG/1
10 TABLET ORAL DAILY
Qty: 30 TABLET | Refills: 1 | Status: SHIPPED | OUTPATIENT
Start: 2022-12-21

## 2022-12-26 ENCOUNTER — LAB ENCOUNTER (OUTPATIENT)
Dept: LAB | Age: 62
End: 2022-12-26
Attending: FAMILY MEDICINE
Payer: COMMERCIAL

## 2022-12-26 DIAGNOSIS — Z00.00 PHYSICAL EXAM: ICD-10-CM

## 2022-12-26 LAB
ALBUMIN SERPL-MCNC: 3.3 G/DL (ref 3.4–5)
ALBUMIN/GLOB SERPL: 1 {RATIO} (ref 1–2)
ALP LIVER SERPL-CCNC: 94 U/L
ALT SERPL-CCNC: 25 U/L
ANION GAP SERPL CALC-SCNC: 4 MMOL/L (ref 0–18)
AST SERPL-CCNC: 16 U/L (ref 15–37)
BASOPHILS # BLD AUTO: 0.07 X10(3) UL (ref 0–0.2)
BASOPHILS NFR BLD AUTO: 1.1 %
BILIRUB SERPL-MCNC: 0.4 MG/DL (ref 0.1–2)
BUN BLD-MCNC: 20 MG/DL (ref 7–18)
CALCIUM BLD-MCNC: 9.4 MG/DL (ref 8.5–10.1)
CHLORIDE SERPL-SCNC: 107 MMOL/L (ref 98–112)
CHOLEST SERPL-MCNC: 208 MG/DL (ref ?–200)
CO2 SERPL-SCNC: 30 MMOL/L (ref 21–32)
CREAT BLD-MCNC: 0.82 MG/DL
EOSINOPHIL # BLD AUTO: 0.15 X10(3) UL (ref 0–0.7)
EOSINOPHIL NFR BLD AUTO: 2.3 %
ERYTHROCYTE [DISTWIDTH] IN BLOOD BY AUTOMATED COUNT: 11.6 %
EST. AVERAGE GLUCOSE BLD GHB EST-MCNC: 123 MG/DL (ref 68–126)
FASTING PATIENT LIPID ANSWER: YES
FASTING STATUS PATIENT QL REPORTED: YES
GFR SERPLBLD BASED ON 1.73 SQ M-ARVRAT: 82 ML/MIN/1.73M2 (ref 60–?)
GLOBULIN PLAS-MCNC: 3.4 G/DL (ref 2.8–4.4)
GLUCOSE BLD-MCNC: 103 MG/DL (ref 70–99)
HBA1C MFR BLD: 5.9 % (ref ?–5.7)
HCT VFR BLD AUTO: 41.5 %
HDLC SERPL-MCNC: 54 MG/DL (ref 40–59)
HGB BLD-MCNC: 13.5 G/DL
IMM GRANULOCYTES # BLD AUTO: 0.02 X10(3) UL (ref 0–1)
IMM GRANULOCYTES NFR BLD: 0.3 %
LDLC SERPL CALC-MCNC: 121 MG/DL (ref ?–100)
LYMPHOCYTES # BLD AUTO: 1.91 X10(3) UL (ref 1–4)
LYMPHOCYTES NFR BLD AUTO: 29.9 %
MCH RBC QN AUTO: 30.5 PG (ref 26–34)
MCHC RBC AUTO-ENTMCNC: 32.5 G/DL (ref 31–37)
MCV RBC AUTO: 93.7 FL
MONOCYTES # BLD AUTO: 0.48 X10(3) UL (ref 0.1–1)
MONOCYTES NFR BLD AUTO: 7.5 %
NEUTROPHILS # BLD AUTO: 3.76 X10 (3) UL (ref 1.5–7.7)
NEUTROPHILS # BLD AUTO: 3.76 X10(3) UL (ref 1.5–7.7)
NEUTROPHILS NFR BLD AUTO: 58.9 %
NONHDLC SERPL-MCNC: 154 MG/DL (ref ?–130)
OSMOLALITY SERPL CALC.SUM OF ELEC: 295 MOSM/KG (ref 275–295)
PLATELET # BLD AUTO: 315 10(3)UL (ref 150–450)
POTASSIUM SERPL-SCNC: 4.6 MMOL/L (ref 3.5–5.1)
PROT SERPL-MCNC: 6.7 G/DL (ref 6.4–8.2)
RBC # BLD AUTO: 4.43 X10(6)UL
SODIUM SERPL-SCNC: 141 MMOL/L (ref 136–145)
TRIGL SERPL-MCNC: 187 MG/DL (ref 30–149)
TSI SER-ACNC: 3.43 MIU/ML (ref 0.36–3.74)
VLDLC SERPL CALC-MCNC: 33 MG/DL (ref 0–30)
WBC # BLD AUTO: 6.4 X10(3) UL (ref 4–11)

## 2022-12-26 PROCEDURE — 83036 HEMOGLOBIN GLYCOSYLATED A1C: CPT | Performed by: FAMILY MEDICINE

## 2022-12-26 PROCEDURE — 80050 GENERAL HEALTH PANEL: CPT | Performed by: FAMILY MEDICINE

## 2022-12-26 PROCEDURE — 80061 LIPID PANEL: CPT | Performed by: FAMILY MEDICINE

## 2022-12-30 ENCOUNTER — PATIENT MESSAGE (OUTPATIENT)
Dept: INTERNAL MEDICINE CLINIC | Facility: CLINIC | Age: 62
End: 2022-12-30

## 2022-12-30 DIAGNOSIS — I10 PRIMARY HYPERTENSION: Primary | ICD-10-CM

## 2022-12-30 DIAGNOSIS — E78.00 HYPERCHOLESTEREMIA: ICD-10-CM

## 2022-12-30 RX ORDER — ATORVASTATIN CALCIUM 80 MG/1
80 TABLET, FILM COATED ORAL NIGHTLY
Qty: 90 TABLET | Refills: 0 | Status: SHIPPED | OUTPATIENT
Start: 2022-12-30

## 2022-12-30 NOTE — TELEPHONE ENCOUNTER
From: Segundo Lloyd  To: Leslie Kruse MD  Sent: 12/30/2022 4:24 AM CST  Subject: Test results    I am taking my cholesterol medication , but I also have been eating alot of chocolate. I have been talking with a therapist, my second meeting is today.     Ricarda Officer

## 2023-01-03 ENCOUNTER — TELEPHONE (OUTPATIENT)
Dept: INTERNAL MEDICINE CLINIC | Facility: CLINIC | Age: 63
End: 2023-01-03

## 2023-01-03 NOTE — TELEPHONE ENCOUNTER
Patient came into the office and dropped off forms for  to fill out and fax over to patients employer. Forms were put in [address] [place] [address]. Spironolactone Counseling: Patient advised regarding risks of diarrhea, abdominal pain, hyperkalemia, birth defects (for female patients), liver toxicity and renal toxicity. The patient may need blood work to monitor liver and kidney function and potassium levels while on therapy. The patient verbalized understanding of the proper use and possible adverse effects of spironolactone.  All of the patient's questions and concerns were addressed. Bactrim Counseling:  I discussed with the patient the risks of sulfa antibiotics including but not limited to GI upset, allergic reaction, drug rash, diarrhea, dizziness, photosensitivity, and yeast infections.  Rarely, more serious reactions can occur including but not limited to aplastic anemia, agranulocytosis, methemoglobinemia, blood dyscrasias, liver or kidney failure, lung infiltrates or desquamative/blistering drug rashes. Erythromycin Pregnancy And Lactation Text: This medication is Pregnancy Category B and is considered safe during pregnancy. It is also excreted in breast milk. Tazorac Pregnancy And Lactation Text: This medication is not safe during pregnancy. It is unknown if this medication is excreted in breast milk. Benzoyl Peroxide Counseling: Patient counseled that medicine may cause skin irritation and bleach clothing.  In the event of skin irritation, the patient was advised to reduce the amount of the drug applied or use it less frequently.   The patient verbalized understanding of the proper use and possible adverse effects of benzoyl peroxide.  All of the patient's questions and concerns were addressed. Topical Sulfur Applications Pregnancy And Lactation Text: This medication is Pregnancy Category C and has an unknown safety profile during pregnancy. It is unknown if this topical medication is excreted in breast milk. High Dose Vitamin A Pregnancy And Lactation Text: High dose vitamin A therapy is contraindicated during pregnancy and breast feeding. Dapsone Counseling: I discussed with the patient the risks of dapsone including but not limited to hemolytic anemia, agranulocytosis, rashes, methemoglobinemia, kidney failure, peripheral neuropathy, headaches, GI upset, and liver toxicity.  Patients who start dapsone require monitoring including baseline LFTs and weekly CBCs for the first month, then every month thereafter.  The patient verbalized understanding of the proper use and possible adverse effects of dapsone.  All of the patient's questions and concerns were addressed. Sarecycline Pregnancy And Lactation Text: This medication is Pregnancy Category D and not consider safe during pregnancy. It is also excreted in breast milk. Erythromycin Counseling:  I discussed with the patient the risks of erythromycin including but not limited to GI upset, allergic reaction, drug rash, diarrhea, increase in liver enzymes, and yeast infections. Azithromycin Pregnancy And Lactation Text: This medication is considered safe during pregnancy and is also secreted in breast milk. Tazorac Counseling:  Patient advised that medication is irritating and drying.  Patient may need to apply sparingly and wash off after an hour before eventually leaving it on overnight.  The patient verbalized understanding of the proper use and possible adverse effects of tazorac.  All of the patient's questions and concerns were addressed. Topical Sulfur Applications Counseling: Topical Sulfur Counseling: Patient counseled that this medication may cause skin irritation or allergic reactions.  In the event of skin irritation, the patient was advised to reduce the amount of the drug applied or use it less frequently.   The patient verbalized understanding of the proper use and possible adverse effects of topical sulfur application.  All of the patient's questions and concerns were addressed. Birth Control Pills Pregnancy And Lactation Text: This medication should be avoided if pregnant and for the first 30 days post-partum. High Dose Vitamin A Counseling: Side effects reviewed, pt to contact office should one occur. Topical Retinoid Pregnancy And Lactation Text: This medication is Pregnancy Category C. It is unknown if this medication is excreted in breast milk. Sarecycline Counseling: Patient advised regarding possible photosensitivity and discoloration of the teeth, skin, lips, tongue and gums.  Patient instructed to avoid sunlight, if possible.  When exposed to sunlight, patients should wear protective clothing, sunglasses, and sunscreen.  The patient was instructed to call the office immediately if the following severe adverse effects occur:  hearing changes, easy bruising/bleeding, severe headache, or vision changes.  The patient verbalized understanding of the proper use and possible adverse effects of sarecycline.  All of the patient's questions and concerns were addressed. Detail Level: Zone Doxycycline Pregnancy And Lactation Text: This medication is Pregnancy Category D and not consider safe during pregnancy. It is also excreted in breast milk but is considered safe for shorter treatment courses. Azithromycin Counseling:  I discussed with the patient the risks of azithromycin including but not limited to GI upset, allergic reaction, drug rash, diarrhea, and yeast infections. Isotretinoin Pregnancy And Lactation Text: This medication is Pregnancy Category X and is considered extremely dangerous during pregnancy. It is unknown if it is excreted in breast milk. Tetracycline Counseling: Patient counseled regarding possible photosensitivity and increased risk for sunburn.  Patient instructed to avoid sunlight, if possible.  When exposed to sunlight, patients should wear protective clothing, sunglasses, and sunscreen.  The patient was instructed to call the office immediately if the following severe adverse effects occur:  hearing changes, easy bruising/bleeding, severe headache, or vision changes.  The patient verbalized understanding of the proper use and possible adverse effects of tetracycline.  All of the patient's questions and concerns were addressed. Patient understands to avoid pregnancy while on therapy due to potential birth defects. Birth Control Pills Counseling: Birth Control Pill Counseling: I discussed with the patient the potential side effects of OCPs including but not limited to increased risk of stroke, heart attack, thrombophlebitis, deep venous thrombosis, hepatic adenomas, breast changes, GI upset, headaches, and depression.  The patient verbalized understanding of the proper use and possible adverse effects of OCPs. All of the patient's questions and concerns were addressed. Topical Clindamycin Pregnancy And Lactation Text: This medication is Pregnancy Category B and is considered safe during pregnancy. It is unknown if it is excreted in breast milk. Doxycycline Counseling:  Patient counseled regarding possible photosensitivity and increased risk for sunburn.  Patient instructed to avoid sunlight, if possible.  When exposed to sunlight, patients should wear protective clothing, sunglasses, and sunscreen.  The patient was instructed to call the office immediately if the following severe adverse effects occur:  hearing changes, easy bruising/bleeding, severe headache, or vision changes.  The patient verbalized understanding of the proper use and possible adverse effects of doxycycline.  All of the patient's questions and concerns were addressed. Topical Retinoid counseling:  Patient advised to apply a pea-sized amount only at bedtime and wait 30 minutes after washing their face before applying.  If too drying, patient may add a non-comedogenic moisturizer. The patient verbalized understanding of the proper use and possible adverse effects of retinoids.  All of the patient's questions and concerns were addressed. Use Enhanced Medication Counseling?: No Isotretinoin Counseling: Patient should get monthly blood tests, not donate blood, not drive at night if vision affected, not share medication, and not undergo elective surgery for 6 months after tx completed. Side effects reviewed, pt to contact office should one occur. Bactrim Pregnancy And Lactation Text: This medication is Pregnancy Category D and is known to cause fetal risk.  It is also excreted in breast milk. Minocycline Counseling: Patient advised regarding possible photosensitivity and discoloration of the teeth, skin, lips, tongue and gums.  Patient instructed to avoid sunlight, if possible.  When exposed to sunlight, patients should wear protective clothing, sunglasses, and sunscreen.  The patient was instructed to call the office immediately if the following severe adverse effects occur:  hearing changes, easy bruising/bleeding, severe headache, or vision changes.  The patient verbalized understanding of the proper use and possible adverse effects of minocycline.  All of the patient's questions and concerns were addressed. Topical Clindamycin Counseling: Patient counseled that this medication may cause skin irritation or allergic reactions.  In the event of skin irritation, the patient was advised to reduce the amount of the drug applied or use it less frequently.   The patient verbalized understanding of the proper use and possible adverse effects of clindamycin.  All of the patient's questions and concerns were addressed. Spironolactone Pregnancy And Lactation Text: This medication can cause feminization of the male fetus and should be avoided during pregnancy. The active metabolite is also found in breast milk. Dapsone Pregnancy And Lactation Text: This medication is Pregnancy Category C and is not considered safe during pregnancy or breast feeding. Benzoyl Peroxide Pregnancy And Lactation Text: This medication is Pregnancy Category C. It is unknown if benzoyl peroxide is excreted in breast milk.

## 2023-01-04 NOTE — TELEPHONE ENCOUNTER
Given letter at last visit available on [employer]    If wants longer FMLA- we will need to discuss at next appt    appt on 1/25/23. Can we wait until then?

## 2023-01-05 NOTE — TELEPHONE ENCOUNTER
Per Dr Sima Knox, message sent to patient via [employer] advising her that prior FMLA letter is available on [employer]. If pt desires a longer FMLA duration she can discuss w/ Dr Sima Knox at her 1/23/23 visit.

## 2023-01-25 ENCOUNTER — TELEMEDICINE (OUTPATIENT)
Dept: INTERNAL MEDICINE CLINIC | Facility: CLINIC | Age: 63
End: 2023-01-25
Payer: COMMERCIAL

## 2023-01-25 DIAGNOSIS — F32.A DEPRESSION, UNSPECIFIED DEPRESSION TYPE: Primary | ICD-10-CM

## 2023-01-25 DIAGNOSIS — E78.00 HYPERCHOLESTEREMIA: ICD-10-CM

## 2023-01-25 PROCEDURE — 99213 OFFICE O/P EST LOW 20 MIN: CPT | Performed by: FAMILY MEDICINE

## 2023-01-25 RX ORDER — ESCITALOPRAM OXALATE 10 MG/1
10 TABLET ORAL DAILY
Qty: 90 TABLET | Refills: 3 | Status: SHIPPED | OUTPATIENT
Start: 2023-01-25

## 2023-04-18 RX ORDER — ATORVASTATIN CALCIUM 80 MG/1
80 TABLET, FILM COATED ORAL NIGHTLY
Qty: 90 TABLET | Refills: 0 | Status: SHIPPED | OUTPATIENT
Start: 2023-04-18

## 2023-05-30 NOTE — TELEPHONE ENCOUNTER
Last OV 12/12/17, last refill 6/8/18. [FreeTextEntry1] : ibuprofen 200 mg x 2 PO dispensed   \par Counseled re: SMART headache management: sleep 8-9 hours per night, eat regular meals including breakfast, increase hydration, exercise regularly, reduce stress, and avoid triggers.  \par Return to clinic as needed if headaches increase in severity or frequency.\par \par \par

## 2023-08-18 RX ORDER — ATORVASTATIN CALCIUM 80 MG/1
80 TABLET, FILM COATED ORAL NIGHTLY
Qty: 90 TABLET | Refills: 0 | Status: SHIPPED | OUTPATIENT
Start: 2023-08-18

## 2023-10-05 RX ORDER — LOSARTAN POTASSIUM 25 MG/1
25 TABLET ORAL DAILY
Qty: 90 TABLET | Refills: 1 | Status: SHIPPED | OUTPATIENT
Start: 2023-10-05

## 2023-10-09 RX ORDER — ATORVASTATIN CALCIUM 80 MG/1
80 TABLET, FILM COATED ORAL NIGHTLY
Qty: 90 TABLET | Refills: 0 | Status: SHIPPED | OUTPATIENT
Start: 2023-10-09

## 2023-10-09 NOTE — TELEPHONE ENCOUNTER
A refill request was received for:  Requested Prescriptions     Pending Prescriptions Disp Refills    metoprolol succinate ER 50 MG Oral Tablet 24 Hr  0     Sig: Take 1 tablet (50 mg total) by mouth daily. Last refill date:  4/7/22    Last office visit: 1/25/23      No future appointments.

## 2023-10-10 RX ORDER — METOPROLOL SUCCINATE 50 MG/1
50 TABLET, EXTENDED RELEASE ORAL DAILY
Qty: 90 TABLET | Refills: 0 | Status: SHIPPED | OUTPATIENT
Start: 2023-10-10

## 2023-10-20 RX ORDER — METOPROLOL SUCCINATE 50 MG/1
50 TABLET, EXTENDED RELEASE ORAL DAILY
Qty: 90 TABLET | Refills: 0 | OUTPATIENT
Start: 2023-10-20

## 2023-11-06 ENCOUNTER — TELEPHONE (OUTPATIENT)
Dept: INTERNAL MEDICINE CLINIC | Facility: CLINIC | Age: 63
End: 2023-11-06

## 2023-11-06 DIAGNOSIS — Z12.31 BREAST CANCER SCREENING BY MAMMOGRAM: Primary | ICD-10-CM

## 2023-11-28 ENCOUNTER — HOSPITAL ENCOUNTER (OUTPATIENT)
Dept: MAMMOGRAPHY | Age: 63
Discharge: HOME | End: 2023-11-28
Attending: FAMILY MEDICINE
Payer: COMMERCIAL

## 2023-11-28 DIAGNOSIS — Z12.31 BREAST CANCER SCREENING BY MAMMOGRAM: ICD-10-CM

## 2023-11-28 PROCEDURE — 77067 SCR MAMMO BI INCL CAD: CPT | Performed by: FAMILY MEDICINE

## 2023-11-28 PROCEDURE — 77063 BREAST TOMOSYNTHESIS BI: CPT | Performed by: FAMILY MEDICINE

## 2024-01-08 DIAGNOSIS — N32.81 OAB (OVERACTIVE BLADDER): ICD-10-CM

## 2024-01-08 NOTE — TELEPHONE ENCOUNTER
Future Appointment:1/24/24      Last Appointment:11/10/22      Last Refill:11/10/22      Medication Requested:   Requested Prescriptions     Pending Prescriptions Disp Refills    OXYBUTYNIN ER 10 MG Oral Tablet 24 Hr [Pharmacy Med Name: Oxybutynin Chloride Er 24hr 10 Mg Tab Zydu] 90 tablet 0     Sig: Take 1 tablet (10 mg total) by mouth daily.

## 2024-01-09 RX ORDER — OXYBUTYNIN CHLORIDE 10 MG/1
10 TABLET, EXTENDED RELEASE ORAL DAILY
Qty: 90 TABLET | Refills: 0 | Status: SHIPPED | OUTPATIENT
Start: 2024-01-09

## 2024-03-05 RX ORDER — METOPROLOL SUCCINATE 50 MG/1
50 TABLET, EXTENDED RELEASE ORAL DAILY
Qty: 90 TABLET | Refills: 0 | Status: SHIPPED | OUTPATIENT
Start: 2024-03-05

## 2024-03-05 NOTE — TELEPHONE ENCOUNTER
A refill request was received for:  Requested Prescriptions     Pending Prescriptions Disp Refills    METOPROLOL SUCCINATE ER 50 MG Oral Tablet 24 Hr [Pharmacy Med Name: Metoprolol Succinate Er 24hr 50 Mg Tab Nort] 90 tablet 0     Sig: TAKE ONE TABLET BY MOUTH ONE TIME DAILY     Last refill date:    10/10/23  Last office visit: 1/25/23      Future Appointments   Date Time Provider Department Center   5/23/2024  9:00 AM Tari Emery MD [facility] [address]

## 2024-04-29 RX ORDER — ATORVASTATIN CALCIUM 80 MG/1
80 TABLET, FILM COATED ORAL NIGHTLY
Qty: 90 TABLET | Refills: 0 | Status: SHIPPED | OUTPATIENT
Start: 2024-04-29

## 2024-04-29 NOTE — TELEPHONE ENCOUNTER
Future Appointment:5/23/24      Last Appointment:11/10/22      Last Refill:10/9/23      Medication Requested:   Requested Prescriptions     Pending Prescriptions Disp Refills    ATORVASTATIN 80 MG Oral Tab [Pharmacy Med Name: Atorvastatin Calcium 80 Mg Tab Nort] 90 tablet 0     Sig: Take 1 tablet (80 mg total) by mouth nightly.       Protocol :     Cholesterol Medication Protocol [id number]/28/2024 07:57 PM   Protocol Details ALT < 80    ALT resulted within past year    Lipid panel within past 12 months    In person appointment or virtual visit in the past 12 mos or appointment in next 3 mos

## 2024-06-26 DIAGNOSIS — F32.A DEPRESSION, UNSPECIFIED DEPRESSION TYPE: ICD-10-CM

## 2024-06-26 DIAGNOSIS — N32.81 OAB (OVERACTIVE BLADDER): ICD-10-CM

## 2024-06-27 DIAGNOSIS — F32.A DEPRESSION, UNSPECIFIED DEPRESSION TYPE: ICD-10-CM

## 2024-06-27 DIAGNOSIS — N32.81 OAB (OVERACTIVE BLADDER): ICD-10-CM

## 2024-06-27 RX ORDER — METOPROLOL SUCCINATE 50 MG/1
50 TABLET, EXTENDED RELEASE ORAL DAILY
Qty: 90 TABLET | Refills: 0 | Status: SHIPPED | OUTPATIENT
Start: 2024-06-27

## 2024-06-27 RX ORDER — LOSARTAN POTASSIUM 25 MG/1
25 TABLET ORAL DAILY
Qty: 90 TABLET | Refills: 0 | OUTPATIENT
Start: 2024-06-27

## 2024-06-27 RX ORDER — OXYBUTYNIN CHLORIDE 10 MG/1
10 TABLET, EXTENDED RELEASE ORAL DAILY
Qty: 90 TABLET | Refills: 0 | OUTPATIENT
Start: 2024-06-27

## 2024-06-27 RX ORDER — ESCITALOPRAM OXALATE 10 MG/1
10 TABLET ORAL DAILY
Qty: 90 TABLET | Refills: 0 | OUTPATIENT
Start: 2024-06-27

## 2024-06-27 RX ORDER — LOSARTAN POTASSIUM 25 MG/1
25 TABLET ORAL DAILY
Qty: 90 TABLET | Refills: 0 | Status: SHIPPED | OUTPATIENT
Start: 2024-06-27

## 2024-06-27 RX ORDER — OXYBUTYNIN CHLORIDE 10 MG/1
10 TABLET, EXTENDED RELEASE ORAL DAILY
Qty: 90 TABLET | Refills: 0 | Status: SHIPPED | OUTPATIENT
Start: 2024-06-27

## 2024-06-27 RX ORDER — ESCITALOPRAM OXALATE 10 MG/1
10 TABLET ORAL DAILY
Qty: 90 TABLET | Refills: 0 | Status: SHIPPED | OUTPATIENT
Start: 2024-06-27

## 2024-06-27 RX ORDER — METOPROLOL SUCCINATE 50 MG/1
50 TABLET, EXTENDED RELEASE ORAL DAILY
Qty: 90 TABLET | Refills: 0 | OUTPATIENT
Start: 2024-06-27

## 2024-06-27 NOTE — TELEPHONE ENCOUNTER
Last OV:1-25-23  Last refill:    Next Appt: With Internal Medicine (Tair Emery MD)  08/07/2024 at 11:40 AM

## 2024-08-05 RX ORDER — ATORVASTATIN CALCIUM 80 MG/1
80 TABLET, FILM COATED ORAL NIGHTLY
Qty: 90 TABLET | Refills: 0 | Status: SHIPPED | OUTPATIENT
Start: 2024-08-05

## 2024-08-05 RX ORDER — ATORVASTATIN CALCIUM 80 MG/1
80 TABLET, FILM COATED ORAL NIGHTLY
Qty: 90 TABLET | Refills: 0 | OUTPATIENT
Start: 2024-08-05

## 2024-08-05 NOTE — TELEPHONE ENCOUNTER
Future Appointment:8/7/24      Last Appointment:11/10/22      Last Refill:4/29/24      Medication Requested:   Requested Prescriptions     Pending Prescriptions Disp Refills    ATORVASTATIN 80 MG Oral Tab [Pharmacy Med Name: Atorvastatin Calcium 80 Mg Tab Nort] 90 tablet 0     Sig: Take 1 tablet (80 mg total) by mouth nightly.     Protocol :     Cholesterol Medication Protocol Aeqpno8008/03/2024 03:59 AM   Protocol Details ALT < 80    ALT resulted within past year    Lipid panel within past 12 months    In person appointment or virtual visit in the past 12 mos or appointment in next 3 mos

## 2024-09-23 DIAGNOSIS — N32.81 OAB (OVERACTIVE BLADDER): ICD-10-CM

## 2024-09-23 DIAGNOSIS — F32.A DEPRESSION, UNSPECIFIED DEPRESSION TYPE: ICD-10-CM

## 2024-09-24 RX ORDER — LOSARTAN POTASSIUM 25 MG/1
25 TABLET ORAL DAILY
Qty: 90 TABLET | Refills: 0 | Status: SHIPPED | OUTPATIENT
Start: 2024-09-24

## 2024-09-24 RX ORDER — ESCITALOPRAM OXALATE 10 MG/1
10 TABLET ORAL DAILY
Qty: 90 TABLET | Refills: 0 | Status: SHIPPED | OUTPATIENT
Start: 2024-09-24

## 2024-09-24 RX ORDER — METOPROLOL SUCCINATE 50 MG/1
50 TABLET, EXTENDED RELEASE ORAL DAILY
Qty: 90 TABLET | Refills: 0 | Status: SHIPPED | OUTPATIENT
Start: 2024-09-24

## 2024-09-24 RX ORDER — OXYBUTYNIN CHLORIDE 10 MG/1
10 TABLET, EXTENDED RELEASE ORAL DAILY
Qty: 90 TABLET | Refills: 0 | Status: SHIPPED | OUTPATIENT
Start: 2024-09-24

## 2024-09-24 NOTE — TELEPHONE ENCOUNTER
Future Appointment:10/9/24      Last Appointment:11/10/22      Last Refill:6/27/24      Medication Requested:   Requested Prescriptions     Pending Prescriptions Disp Refills    LOSARTAN 25 MG Oral Tab [Pharmacy Med Name: Losartan Potassium 25 Mg Tab Camb] 90 tablet 0     Sig: Take 1 tablet (25 mg total) by mouth daily.    OXYBUTYNIN ER 10 MG Oral Tablet 24 Hr [Pharmacy Med Name: Oxybutynin Chloride Er 24hr 10 Mg Tab Zydu] 90 tablet 0     Sig: Take 1 tablet (10 mg total) by mouth daily.    METOPROLOL SUCCINATE ER 50 MG Oral Tablet 24 Hr [Pharmacy Med Name: Metoprolol Succinate Er 24hr 50 Mg Tab Nort] 90 tablet 0     Sig: Take 1 tablet (50 mg total) by mouth daily.    ESCITALOPRAM 10 MG Oral Tab [Pharmacy Med Name: Escitalopram Oxalate 10 Mg Tab Auro] 90 tablet 0     Sig: Take 1 tablet (10 mg total) by mouth daily.     Protocol :       Hypertension Medications Protocol Azcrro5409/23/2024 03:03 AM   Protocol Details CMP or BMP in past 12 months    EGFRCR or GFRNAA > 50    Last BP reading less than 140/90    In person appointment or virtual visit in the past 12 mos or appointment in next 3 mos          Psychiatric Non-Scheduled (Anti-Anxiety) [id number]/23/2024 03:03 AM   Protocol Details Depression Screening completed within the past 12 months    In person appointment or virtual visit in the past 6 mos or appointment in next 3 mos

## 2024-11-11 RX ORDER — ATORVASTATIN CALCIUM 80 MG/1
80 TABLET, FILM COATED ORAL NIGHTLY
Qty: 90 TABLET | Refills: 0 | Status: SHIPPED | OUTPATIENT
Start: 2024-11-11

## 2024-11-11 NOTE — TELEPHONE ENCOUNTER
Future Appt. 12/19/2024    Last Visit: 01/25/2023    Medication Requested:  Requested Prescriptions     Pending Prescriptions Disp Refills    ATORVASTATIN 80 MG Oral Tab [Pharmacy Med Name: Atorvastatin Calcium 80 Mg Tab Nort] 90 tablet 0     Sig: Take 1 tablet (80 mg total) by mouth nightly.        Last refill:      Disp Refills Start End     ATORVASTATIN 80 MG Oral Tab 90 tablet 0 8/5/2024 --    Sig - Route: Take 1 tablet (80 mg total) by mouth nightly. - Oral      Cholesterol Medication Protocol Ashgyr1711/11/2024 04:47 AM   Protocol Details ALT < 80    ALT resulted within past year    Lipid panel within past 12 months    In person appointment or virtual visit in the past 12 mos or appointment in next 3 mos

## 2024-12-26 DIAGNOSIS — F32.A DEPRESSION, UNSPECIFIED DEPRESSION TYPE: ICD-10-CM

## 2024-12-26 DIAGNOSIS — N32.81 OAB (OVERACTIVE BLADDER): ICD-10-CM

## 2024-12-26 RX ORDER — OXYBUTYNIN CHLORIDE 10 MG/1
10 TABLET, EXTENDED RELEASE ORAL DAILY
Qty: 90 TABLET | Refills: 0 | OUTPATIENT
Start: 2024-12-26

## 2024-12-26 RX ORDER — METOPROLOL SUCCINATE 50 MG/1
50 TABLET, EXTENDED RELEASE ORAL DAILY
Qty: 90 TABLET | Refills: 0 | OUTPATIENT
Start: 2024-12-26

## 2024-12-26 RX ORDER — ESCITALOPRAM OXALATE 10 MG/1
10 TABLET ORAL DAILY
Qty: 90 TABLET | Refills: 0 | OUTPATIENT
Start: 2024-12-26

## 2024-12-26 RX ORDER — LOSARTAN POTASSIUM 25 MG/1
25 TABLET ORAL DAILY
Qty: 90 TABLET | Refills: 0 | OUTPATIENT
Start: 2024-12-26

## 2025-01-20 ENCOUNTER — OFFICE VISIT (OUTPATIENT)
Dept: FAMILY MEDICINE CLINIC | Facility: CLINIC | Age: 65
End: 2025-01-20
Payer: COMMERCIAL

## 2025-01-20 ENCOUNTER — APPOINTMENT (OUTPATIENT)
Dept: ULTRASOUND IMAGING | Age: 65
End: 2025-01-20
Attending: EMERGENCY MEDICINE
Payer: COMMERCIAL

## 2025-01-20 ENCOUNTER — HOSPITAL ENCOUNTER (EMERGENCY)
Age: 65
Discharge: HOME | End: 2025-01-20
Attending: EMERGENCY MEDICINE
Payer: COMMERCIAL

## 2025-01-20 VITALS
WEIGHT: 250 LBS | HEART RATE: 55 BPM | RESPIRATION RATE: 16 BRPM | SYSTOLIC BLOOD PRESSURE: 159 MMHG | TEMPERATURE: 98 F | DIASTOLIC BLOOD PRESSURE: 59 MMHG | BODY MASS INDEX: 40.18 KG/M2 | HEIGHT: 66 IN | OXYGEN SATURATION: 99 %

## 2025-01-20 VITALS
OXYGEN SATURATION: 96 % | WEIGHT: 250 LBS | HEIGHT: 66 IN | RESPIRATION RATE: 18 BRPM | TEMPERATURE: 97 F | HEART RATE: 62 BPM | BODY MASS INDEX: 40.18 KG/M2 | DIASTOLIC BLOOD PRESSURE: 57 MMHG | SYSTOLIC BLOOD PRESSURE: 162 MMHG

## 2025-01-20 DIAGNOSIS — M79.89 SWELLING OF RIGHT UPPER EXTREMITY: Primary | ICD-10-CM

## 2025-01-20 DIAGNOSIS — M79.601 RIGHT ARM PAIN: Primary | ICD-10-CM

## 2025-01-20 LAB
ALBUMIN SERPL-MCNC: 3.9 G/DL (ref 3.2–4.8)
ALBUMIN/GLOB SERPL: 1.5 {RATIO} (ref 1–2)
ALP LIVER SERPL-CCNC: 90 U/L
ALT SERPL-CCNC: 24 U/L
ANION GAP SERPL CALC-SCNC: 4 MMOL/L (ref 0–18)
AST SERPL-CCNC: 21 U/L (ref ?–34)
BASOPHILS # BLD AUTO: 0.05 X10(3) UL (ref 0–0.2)
BASOPHILS NFR BLD AUTO: 1 %
BILIRUB SERPL-MCNC: 0.3 MG/DL (ref 0.2–1.1)
BUN BLD-MCNC: 19 MG/DL (ref 9–23)
CALCIUM BLD-MCNC: 9.5 MG/DL (ref 8.7–10.6)
CHLORIDE SERPL-SCNC: 105 MMOL/L (ref 98–112)
CO2 SERPL-SCNC: 31 MMOL/L (ref 21–32)
CREAT BLD-MCNC: 0.71 MG/DL
EGFRCR SERPLBLD CKD-EPI 2021: 96 ML/MIN/1.73M2 (ref 60–?)
EOSINOPHIL # BLD AUTO: 0.14 X10(3) UL (ref 0–0.7)
EOSINOPHIL NFR BLD AUTO: 2.7 %
ERYTHROCYTE [DISTWIDTH] IN BLOOD BY AUTOMATED COUNT: 11.6 %
GLOBULIN PLAS-MCNC: 2.6 G/DL (ref 2–3.5)
GLUCOSE BLD-MCNC: 113 MG/DL (ref 70–99)
HCT VFR BLD AUTO: 39.1 %
HGB BLD-MCNC: 13.5 G/DL
IMM GRANULOCYTES # BLD AUTO: 0.02 X10(3) UL (ref 0–1)
IMM GRANULOCYTES NFR BLD: 0.4 %
LYMPHOCYTES # BLD AUTO: 1.63 X10(3) UL (ref 1–4)
LYMPHOCYTES NFR BLD AUTO: 31 %
MCH RBC QN AUTO: 31.6 PG (ref 26–34)
MCHC RBC AUTO-ENTMCNC: 34.5 G/DL (ref 31–37)
MCV RBC AUTO: 91.6 FL
MONOCYTES # BLD AUTO: 0.45 X10(3) UL (ref 0.1–1)
MONOCYTES NFR BLD AUTO: 8.6 %
NEUTROPHILS # BLD AUTO: 2.97 X10 (3) UL (ref 1.5–7.7)
NEUTROPHILS # BLD AUTO: 2.97 X10(3) UL (ref 1.5–7.7)
NEUTROPHILS NFR BLD AUTO: 56.3 %
OSMOLALITY SERPL CALC.SUM OF ELEC: 293 MOSM/KG (ref 275–295)
PLATELET # BLD AUTO: 265 10(3)UL (ref 150–450)
POTASSIUM SERPL-SCNC: 4.5 MMOL/L (ref 3.5–5.1)
PROT SERPL-MCNC: 6.5 G/DL (ref 5.7–8.2)
RBC # BLD AUTO: 4.27 X10(6)UL
SODIUM SERPL-SCNC: 140 MMOL/L (ref 136–145)
WBC # BLD AUTO: 5.3 X10(3) UL (ref 4–11)

## 2025-01-20 PROCEDURE — 80053 COMPREHEN METABOLIC PANEL: CPT | Performed by: EMERGENCY MEDICINE

## 2025-01-20 PROCEDURE — 93971 EXTREMITY STUDY: CPT | Performed by: EMERGENCY MEDICINE

## 2025-01-20 PROCEDURE — 85025 COMPLETE CBC W/AUTO DIFF WBC: CPT | Performed by: EMERGENCY MEDICINE

## 2025-01-20 PROCEDURE — 99284 EMERGENCY DEPT VISIT MOD MDM: CPT

## 2025-01-20 PROCEDURE — 36415 COLL VENOUS BLD VENIPUNCTURE: CPT

## 2025-01-20 NOTE — DISCHARGE INSTRUCTIONS
Please continue to monitor your symptoms.  Keep the arm elevated and rested when not using it.  No heavy activity.  Return for any problems

## 2025-01-20 NOTE — ED PROVIDER NOTES
Patient Seen in: [facility] [place]      History     Chief Complaint   Patient presents with    Swelling Edema     Stated Complaint: right arm swelling and redness since this morning    Subjective:   HPI      Patient woke earlier this morning with swelling to the right hand, pain in the right arm.  She went to an immediate care, was told that there was redness in the upper arm, sent to the ER.  Patient says she always sleeps on her back with hands crossed across her chest.  She does not think she would have slept on the right arm since she sleeps at the far right of her bed.  No history of blood clots, no family history of clotting issues.  No recent fevers chills sweats no other problems or complaints.  Patient shows a picture of her right hand from this morning shows significant edema.  Definitely improved from that time.  Objective:     Past Medical History:    Abdominal pain    Anxiety state    Arthritis    Belching    Bloating    Blurred vision    Need new glasses    Change in hair    Depression    Diarrhea, unspecified    Easy bruising    Bruise and bleed easy    Essential hypertension    Fatigue    Flatulence/gas pain/belching    Heartburn    High blood pressure    High cholesterol    History of depression    Hyperlipidemia    Irregular bowel habits    Loss of appetite    Nausea    Osteoarthritis    knees    Pain in joints    Right knee replacement    Painful swallowing    Postmenopause    Problems with swallowing    Uncomfortable fullness after meals    Visual impairment    glasses    Vomiting    Rescently has gotten worae    Wears glasses    Weight loss              Past Surgical History:   Procedure Laterality Date    Added finger surgery      Cholecystectomy  6/1/2013    Colonoscopy  11/29/2014    Foot/toes surgery proc unlisted      Knee replacement surgery Right 06/2019    no complications    Knee replacement surgery Left 12/2/202    Removal gallbladder      Tonsillectomy                   Social History     Socioeconomic History    Marital status: Single   Tobacco Use    Smoking status: Former     Current packs/day: 0.00     Average packs/day: 1 pack/day for 30.0 years (30.0 ttl pk-yrs)     Types: Cigarettes     Start date: 1981     Quit date:      Years since quittin.7    Smokeless tobacco: Never   Vaping Use    Vaping status: Never Used   Substance and Sexual Activity    Alcohol use: Yes     Comment: rarely    Drug use: No   Other Topics Concern    Caffeine Concern Yes     Comment: 3 cups a day coffee    Exercise No                  Physical Exam     ED Triage Vitals [25 1116]   /67   Pulse 62   Resp 17   Temp 97.9 °F (36.6 °C)   Temp src Oral   SpO2 98 %   O2 Device None (Room air)       Current Vitals:   Vital Signs  BP: 159/59  Pulse: 55  Resp: 16  Temp: 97.7 °F (36.5 °C)  Temp src: Temporal    Oxygen Therapy  SpO2: 99 %  O2 Device: None (Room air)        Physical Exam  General: Well-developed, well-nourished, comfortable, no acute distress  Head and neck: Normocephalic, atraumatic  Cardiovascular: Regular rate and rhythm, normal S1 and S2, no obvious murmurs, rubs, or gallops   Lungs: Clear to auscultation bilaterally with equal breath sounds, no wheezing, no rhonchi   Abdomen: Positive bowel sounds,  soft, no tenderness, no distension, no rebound, no guarding   Extremities: No cyanosis, no clubbing, minimal puffiness of right hand compared to left.  Right upper arm circumference approximately 2 cm larger than left  Musculoskeletal: No tenderness, swelling, deformity   Skin: No significant lesions   Neuro: Grossly intact to patient's baseline      ED Course     Labs Reviewed   COMP METABOLIC PANEL (14) - Abnormal; Notable for the following components:       Result Value    Glucose 113 (*)     All other components within normal limits   CBC WITH DIFFERENTIAL WITH PLATELET   RAINBOW DRAW LAVENDER   RAINBOW DRAW LIGHT GREEN   RAINBOW DRAW BLUE    Differential diagnosis includes DVT, dependent edema, among other processes         Ultrasound venous Doppler images reviewed by myself show no acute DVT.  Radiology read concurs no acute process       MDM      64-year-old, history of hypertension, no history of blood clots, woke this morning with right hand swelling.  On exam 2 cm difference in the upper arm circumference.  Right hand swelling is already improving.  Ultrasound venous Doppler is reassuring.  Patient has no signs of cellulitis, good pulses good cap refill, no abnormalities.  Longer patient stays in the ER for the more her exam is normalized.  We discussed exam findings and test results.  Patient feels comfortable going home.  She will follow-up with her primary for recheck tomorrow.  Aftercare instructions reviewed all questions answered.        MDM    Disposition and Plan     Clinical Impression:  1. Swelling of right upper extremity         Disposition:  Discharge  1/20/2025  2:16 pm    Follow-up:  Tari Emery MD  [address] [place] [id number]  [phone]    Follow up in 1 day(s)            Medications Prescribed:  Discharge Medication List as of 1/20/2025  2:20 PM              Supplementary Documentation:

## 2025-01-20 NOTE — ED INITIAL ASSESSMENT (HPI)
Patient noticed right arm swelling and redness this morning, seen at urgent care. Patient denies fevers, pain to right shoulder with arm raised and to touch, denies pain to forearm or hand. Swelling and redness has subsided on it's own from pictures shown by patient.

## 2025-01-20 NOTE — PROGRESS NOTES
CHIEF COMPLAINT:     Chief Complaint   Patient presents with    Insect Bite     Right hand swollen.. arm hurts - Entered by patient  Started early morning right hand             HPI:    Kerline Boland is a 64 year old female who presents for evaluation of a rash.  Per patient rash started in the past 8 hours. Rash has been worsening since onset.  Patient never had similar rash in the past. The rash is characterized by right arm/hand swelling and redness and pain. Patient has treated rash with ibuprofen.  Associated symptoms include: pain and swelling in right arm and hand  Exposure: none.    Pertinent negatives include no anorexia, congestion, cough, diarrhea, eye pain, facial edema, fatigue, fever, joint pain, rhinorrhea, shortness of breath, sore throat or vomiting.      Current Outpatient Medications   Medication Sig Dispense Refill    atorvastatin 80 MG Oral Tab Take 1 tablet (80 mg total) by mouth nightly. 90 tablet 0    LOSARTAN 25 MG Oral Tab Take 1 tablet (25 mg total) by mouth daily. 90 tablet 0    OXYBUTYNIN ER 10 MG Oral Tablet 24 Hr Take 1 tablet (10 mg total) by mouth daily. 90 tablet 0    METOPROLOL SUCCINATE ER 50 MG Oral Tablet 24 Hr Take 1 tablet (50 mg total) by mouth daily. 90 tablet 0    ESCITALOPRAM 10 MG Oral Tab Take 1 tablet (10 mg total) by mouth daily. 90 tablet 0    Multiple Vitamin (MULTI-VITAMIN DAILY) Oral Tab Take by mouth.        Past Medical History:    Abdominal pain    Anxiety state    Arthritis    Belching    Bloating    Blurred vision    Need new glasses    Change in hair    Depression    Diarrhea, unspecified    Easy bruising    Bruise and bleed easy    Essential hypertension    Fatigue    Flatulence/gas pain/belching    Heartburn    High blood pressure    High cholesterol    History of depression    Hyperlipidemia    Irregular bowel habits    Loss of appetite    Nausea    Osteoarthritis    knees    Pain in joints    Right knee replacement    Painful swallowing    Postmenopause     Problems with swallowing    Uncomfortable fullness after meals    Visual impairment    glasses    Vomiting    Rescently has gotten worae    Wears glasses    Weight loss      Past Surgical History:   Procedure Laterality Date    Added finger surgery      Cholecystectomy  2013    Colonoscopy  2014    Foot/toes surgery proc unlisted      Knee replacement surgery Right 2019    no complications    Knee replacement surgery Left     Removal gallbladder      Tonsillectomy        Family History   Problem Relation Age of Onset    Heart Disorder Father     Diabetes Father         Passed    Heart Attack Father         Passed    Other (Other) Father     Heart Disorder Mother     Hypertension Mother         Passed    Heart Attack Mother         Passed    Mental Disorder Mother         Bi-Polar    Other (Other) Mother     Cancer Brother 28        burkitt's lymphoma    Alcohol and Other Disorders Associated Brother         Cocaine    Diabetes Brother     Hypertension Brother     Heart Attack Brother     Stroke Brother     Heart Disorder Brother 35    Other (Other) Brother         stroke    Breast Cancer Daughter 40      Social History     Socioeconomic History    Marital status: Single   Tobacco Use    Smoking status: Former     Current packs/day: 0.00     Average packs/day: 1 pack/day for 30.0 years (30.0 ttl pk-yrs)     Types: Cigarettes     Start date: 1981     Quit date:      Years since quittin.7    Smokeless tobacco: Never   Vaping Use    Vaping status: Never Used   Substance and Sexual Activity    Alcohol use: Yes     Comment: rarely    Drug use: No   Other Topics Concern    Caffeine Concern Yes     Comment: 3 cups a day coffee    Exercise No         REVIEW OF SYSTEMS:   GENERAL: feels well otherwise, no fever, no chills.  SKIN: Per HPI. No edema. No ulcerations.  HEENT: Denies rhinorrhea, edema of the lips or swelling of throat.  CARDIOVASCULAR: Denies chest pains or  palpitations.  LUNGS: Denies shortness of breath with exertion or rest. No cough or wheezing.  LYMPH: Denies enlargement of the lymph nodes.  NEURO: Denies abnormal sensation, tingling of the skin, or numbness.      EXAM:   BP (!) 162/57   Pulse 62   Temp 97 °F (36.1 °C)   Resp 18   Ht 5' 6\" (1.676 m)   Wt 250 lb (113.4 kg)   LMP 11/05/2010   SpO2 96%   BMI 40.35 kg/m²   GENERAL: well developed, well nourished,in no apparent distress  SKIN: right hand and arm up to shoulder + edematous. Right bicep + erythema with warmth and tenderness  EYES: PERRLA, EOMI, conjunctiva are clear  HENT: Head atraumatic, normocephalic. TM's WNL bilaterally. Normal external nose. Nasal mucosa pink without edema. No erythema of the throat. Oropharynx moist without lesions.  LUNGS: Clear to auscultation bilaterally.  No wheezing, rhonchi, or rales.  No diminished breath sounds. No increased work of breathing.   CARDIO: RRR without murmur  JOINTS: normal ROM  LYMPH: No  lymphadenopathy.     ASSESSMENT AND PLAN:   Kerline Boland is a 64 year old female who presents for evaluation of a rash. Findings are consistent with:    ASSESSMENT:  Encounter Diagnosis   Name Primary?    Right arm pain Yes       PLAN: discussed limitations in [facility] and that patient needs to be evaluated at a higher level of care to r/o infection vs blood clot. Pt agreed with plan and will go to PED.

## 2025-02-04 NOTE — PROGRESS NOTES
HPI:   Kerline Boland is a 64 year old female who presents for a complete physical exam.  Last pap: 7/2020  exerise:  Active at work, not dedicated     HTN-  Losartan and metoprolol     HL   Prediabetic     Daughter has breast cancer   Kerline was taking of her grandson for awhile and stress eating   Has gained weight         OAB -oxybutinin helping       Works at [employer]    Smoked for 30 years 1ppd  and quit 13 years ago      Wt Readings from Last 6 Encounters:   02/06/25 257 lb 9.6 oz (116.8 kg)   01/20/25 250 lb (113.4 kg)   01/20/25 250 lb (113.4 kg)   11/10/22 251 lb (113.9 kg)   05/23/22 251 lb (113.9 kg)   04/25/22 250 lb (113.4 kg)     Body mass index is 41.58 kg/m².     Cholesterol, Total (mg/dL)   Date Value   12/26/2022 208 (H)   04/14/2022 173   07/07/2021 210 (H)     CHOLESTEROL (mg/dL)   Date Value   10/15/2013 269 (H)     HDL Cholesterol (mg/dL)   Date Value   12/26/2022 54   04/14/2022 63 (H)   07/07/2021 64 (H)     HDL CHOL (mg/dL)   Date Value   10/15/2013 65     LDL Cholesterol (mg/dL)   Date Value   12/26/2022 121 (H)   04/14/2022 90   07/07/2021 115 (H)     LDL CHOLESTROL (mg/dL)   Date Value   10/15/2013 170 (H)        Current Outpatient Medications   Medication Sig Dispense Refill    atorvastatin 80 MG Oral Tab Take 1 tablet (80 mg total) by mouth nightly. 90 tablet 3    escitalopram 10 MG Oral Tab Take 1 tablet (10 mg total) by mouth daily. 90 tablet 3    losartan 25 MG Oral Tab Take 1 tablet (25 mg total) by mouth daily. 90 tablet 3    metoprolol succinate ER 50 MG Oral Tablet 24 Hr Take 1 tablet (50 mg total) by mouth daily. 90 tablet 3    oxybutynin ER 10 MG Oral Tablet 24 Hr Take 1 tablet (10 mg total) by mouth daily. 90 tablet 3      Past Medical History:    Abdominal pain    Anxiety state    Arthritis    Belching    Bloating    Blurred vision    Need new glasses    Change in hair    Depression    Diarrhea, unspecified    Easy bruising    Bruise and bleed easy    Essential hypertension     Fatigue    Flatulence/gas pain/belching    Heartburn    High blood pressure    High cholesterol    History of depression    Hyperlipidemia    Irregular bowel habits    Loss of appetite    Nausea    Osteoarthritis    knees    Pain in joints    Right knee replacement    Painful swallowing    Postmenopause    Problems with swallowing    Uncomfortable fullness after meals    Visual impairment    glasses    Vomiting    Rescently has gotten worae    Wears glasses    Weight loss      Past Surgical History:   Procedure Laterality Date    Added finger surgery      Cholecystectomy  2013    Colonoscopy  2014    Foot/toes surgery proc unlisted      Knee replacement surgery Right 2019    no complications    Knee replacement surgery Left     Removal gallbladder      Tonsillectomy        Family History   Problem Relation Age of Onset    Heart Disorder Father     Diabetes Father         Passed    Heart Attack Father         Passed    Other (Other) Father     Heart Disorder Mother     Hypertension Mother         Passed    Heart Attack Mother         Passed    Mental Disorder Mother         Bi-Polar    Other (Other) Mother     Cancer Brother 28        burkitt's lymphoma    Alcohol and Other Disorders Associated Brother         Cocaine    Diabetes Brother     Hypertension Brother     Heart Attack Brother     Stroke Brother     Heart Disorder Brother 35    Other (Other) Brother         stroke    Breast Cancer Daughter 40      Social History:   Social History     Socioeconomic History    Marital status: Single   Tobacco Use    Smoking status: Former     Current packs/day: 0.00     Average packs/day: 1 pack/day for 30.0 years (30.0 ttl pk-yrs)     Types: Cigarettes     Start date: 1981     Quit date:      Years since quittin.7    Smokeless tobacco: Never   Vaping Use    Vaping status: Never Used   Substance and Sexual Activity    Alcohol use: Yes     Comment: rarely    Drug use: No   Other Topics  Concern    Caffeine Concern Yes     Comment: 3 cups a day coffee    Exercise No          REVIEW OF SYSTEMS:   GENERAL: feels well otherwise  LUNGS: denies shortness of breath  CARDIOVASCULAR: denies chest pain  GI: denies abdominal pain,  No constipation or diarrhea  PSYCHE: denies depression or anxiety    EXAM:   /74   Pulse 59   Ht 5' 6\" (1.676 m)   Wt 257 lb 9.6 oz (116.8 kg)   LMP 11/05/2010   SpO2 98%   BMI 41.58 kg/m²   Body mass index is 41.58 kg/m².   GENERAL: well developed, well nourished,in no apparent distress  SKIN: no rashes,no suspicious lesions  HEENT: atraumatic, normocephalic,  EYES:,conjunctiva are clear  NECK: supple,no adenopathy  BREAST: no dominant or suspicious mass, no nipple discharge  LUNGS: clear to auscultation  CARDIO: RRR with 2/6 Systolic murmur  GI: good BS's,no masses, HSM or tenderness  EXTREMITIES: no edema    ASSESSMENT AND PLAN:   Kerline Boland is a 64 year old female who presents for a complete physical exam.  Encounter Diagnoses   Name Primary?    Physical exam Yes    Encounter for screening mammogram for malignant neoplasm of breast     Depression, unspecified depression type     Hypercholesteremia     Primary hypertension     OAB (overactive bladder)     Polyp of colon, unspecified part of colon, unspecified type     Obesity, Class III, BMI 40-49.9 (morbid obesity) (HCC)      Discussed with patient pap smear guidelines and the importance of screening for cervical cancer  Discussed the importance of yearly mammograms starting at age 40 to help detect breast cancer.   Self breast exam explained and encouraged to perform monthly.   Health maintenance labs, recommend yearly: Lipids, CMP, and CBC.   Discussed importance of screening for colon cancer with colonoscopies starting at age 45, or sooner if high risk  Recommended low fat diet, low carb diet and regular aerobic exercise.    The patient indicates understanding of these issues and agrees to the plan.  The  patient is asked to return for CPX in 1 yr.  1. Physical exam    - CBC With Differential With Platelet; Future  - Comp Metabolic Panel (14); Future  - TSH W Reflex To Free T4; Future  - Lipid Panel; Future  - Hemoglobin A1C (Glycohemoglobin) [E]; Future  - TdaP (Adacel, Boostrix) [02122]    2. Encounter for screening mammogram for malignant neoplasm of breast      - [facility] TYLER 2D+3D SCREENING BILAT (CPT=77067/93180); Future    3. Depression, unspecified depression type    Controlled CPM   - escitalopram 10 MG Oral Tab; Take 1 tablet (10 mg total) by mouth daily.  Dispense: 90 tablet; Refill: 3    4. Hypercholesteremia      - atorvastatin 80 MG Oral Tab; Take 1 tablet (80 mg total) by mouth nightly.  Dispense: 90 tablet; Refill: 3    5. Primary hypertension  Needs better control  Work on weight loss  Continue metoprolol ER 50 and increase losartan to  50 mg.  Follow-up 1 to 2 months for blood pressure recheck visit  - metoprolol succinate ER 50 MG Oral Tablet 24 Hr; Take 1 tablet (50 mg total) by mouth daily.  Dispense: 90 tablet; Refill: 3    6. OAB (overactive bladder)    Continue oxybutynin.  No side effects.  - oxybutynin ER 10 MG Oral Tablet 24 Hr; Take 1 tablet (10 mg total) by mouth daily.  Dispense: 90 tablet; Refill: 3    7. Polyp of colon, unspecified part of colon, unspecified type      - Gastro Referral - In Network    8. Obesity, Class III, BMI 40-49.9 (morbid obesity) (HCC)  Pt counseled on importance of weight loss and exercise. Recommend 30 min of cardio activity 5 times a week. Advised small high protein meals and snacks throughout day. Avoid carbs and sugars. Increase water and not to drink liquid calories. Pt given printed info on weight loss recommendations.       9. Screening for lung cancer      - CT LUNG LD SCREENING(CPT=71271); Future    10. History of tobacco use      - CT LUNG LD SCREENING(CPT=71271); Future        Orders Placed This Encounter   Procedures    CBC With Differential With  Platelet    Comp Metabolic Panel (14)    TSH W Reflex To Free T4    Lipid Panel    Hemoglobin A1C (Glycohemoglobin) [E]    Prevnar 20 (PCV20) [89373]    TdaP (Adacel, Boostrix) [87642]       Meds & Refills for this Visit:  Requested Prescriptions     Signed Prescriptions Disp Refills    atorvastatin 80 MG Oral Tab 90 tablet 3     Sig: Take 1 tablet (80 mg total) by mouth nightly.    escitalopram 10 MG Oral Tab 90 tablet 3     Sig: Take 1 tablet (10 mg total) by mouth daily.    losartan 25 MG Oral Tab 90 tablet 3     Sig: Take 1 tablet (25 mg total) by mouth daily.    metoprolol succinate ER 50 MG Oral Tablet 24 Hr 90 tablet 3     Sig: Take 1 tablet (50 mg total) by mouth daily.    oxybutynin ER 10 MG Oral Tablet 24 Hr 90 tablet 3     Sig: Take 1 tablet (10 mg total) by mouth daily.       Imaging & Consults:  PCV20 VACCINE FOR INTRAMUSCULAR USE  TETANUS, DIPHTHERIA TOXOIDS AND ACELLULAR PERTUSIS VACCINE (TDAP), >7 YEARS, IM USE  GASTRO - INTERNAL  [facility] TYLER 2D+3D SCREENING BILAT (CPT=77067/64456)

## 2025-02-06 ENCOUNTER — OFFICE VISIT (OUTPATIENT)
Dept: INTERNAL MEDICINE CLINIC | Facility: CLINIC | Age: 65
End: 2025-02-06
Payer: COMMERCIAL

## 2025-02-06 VITALS
HEART RATE: 59 BPM | OXYGEN SATURATION: 98 % | DIASTOLIC BLOOD PRESSURE: 76 MMHG | SYSTOLIC BLOOD PRESSURE: 160 MMHG | WEIGHT: 257.63 LBS | HEIGHT: 66 IN | BODY MASS INDEX: 41.4 KG/M2

## 2025-02-06 DIAGNOSIS — Z87.891 HISTORY OF TOBACCO USE: ICD-10-CM

## 2025-02-06 DIAGNOSIS — F32.A DEPRESSION, UNSPECIFIED DEPRESSION TYPE: ICD-10-CM

## 2025-02-06 DIAGNOSIS — N32.81 OAB (OVERACTIVE BLADDER): ICD-10-CM

## 2025-02-06 DIAGNOSIS — K63.5 POLYP OF COLON, UNSPECIFIED PART OF COLON, UNSPECIFIED TYPE: ICD-10-CM

## 2025-02-06 DIAGNOSIS — E66.01 OBESITY, CLASS III, BMI 40-49.9 (MORBID OBESITY) (HCC): ICD-10-CM

## 2025-02-06 DIAGNOSIS — I10 PRIMARY HYPERTENSION: ICD-10-CM

## 2025-02-06 DIAGNOSIS — Z12.2 SCREENING FOR LUNG CANCER: ICD-10-CM

## 2025-02-06 DIAGNOSIS — Z00.00 PHYSICAL EXAM: Primary | ICD-10-CM

## 2025-02-06 DIAGNOSIS — E78.00 HYPERCHOLESTEREMIA: ICD-10-CM

## 2025-02-06 DIAGNOSIS — Z12.31 ENCOUNTER FOR SCREENING MAMMOGRAM FOR MALIGNANT NEOPLASM OF BREAST: ICD-10-CM

## 2025-02-06 RX ORDER — LOSARTAN POTASSIUM 50 MG/1
50 TABLET ORAL DAILY
Qty: 90 TABLET | Refills: 1 | Status: SHIPPED | OUTPATIENT
Start: 2025-02-06

## 2025-02-06 RX ORDER — ESCITALOPRAM OXALATE 10 MG/1
10 TABLET ORAL DAILY
Qty: 90 TABLET | Refills: 3 | Status: SHIPPED | OUTPATIENT
Start: 2025-02-06

## 2025-02-06 RX ORDER — OXYBUTYNIN CHLORIDE 10 MG/1
10 TABLET, EXTENDED RELEASE ORAL DAILY
Qty: 90 TABLET | Refills: 3 | Status: SHIPPED | OUTPATIENT
Start: 2025-02-06

## 2025-02-06 RX ORDER — METOPROLOL SUCCINATE 50 MG/1
50 TABLET, EXTENDED RELEASE ORAL DAILY
Qty: 90 TABLET | Refills: 3 | Status: SHIPPED | OUTPATIENT
Start: 2025-02-06

## 2025-02-06 RX ORDER — ATORVASTATIN CALCIUM 80 MG/1
80 TABLET, FILM COATED ORAL NIGHTLY
Qty: 90 TABLET | Refills: 3 | Status: SHIPPED | OUTPATIENT
Start: 2025-02-06

## 2025-02-06 RX ORDER — LOSARTAN POTASSIUM 25 MG/1
25 TABLET ORAL DAILY
Qty: 90 TABLET | Refills: 3 | Status: SHIPPED | OUTPATIENT
Start: 2025-02-06 | End: 2025-02-06

## 2025-02-06 NOTE — PATIENT INSTRUCTIONS
If I am prescribing weight loss medicine for you, you need to see me every 3 months for refills and for the best success!   The goal is not always weight loss, but better health. This can be measured with labs,  your vital signs and how you are feeling.  Please discuss any questions or challenges you are having with me.      Weight loss recommendations:    For diet:   Focus on less carbs and more protein in your diet.          Limit carbohydrates to <100 gms per day, Protein goal of 100 gms per day ( or 1.3-1.6 grams of protein per kilogram per day).  Increase fiber to 25-35g   To do this: cut down on sugar, carbs, increase protein and natural fats ( lean meats, avocado, eggs ), increase natural fiber with fruits and veggies   Good sources of protein:  Chicken, lean meat, salmon,  shrimp, eggs, quinoa, nut butter, almonds, nuts, lentils, black beans, greek yogurt, chickpeas , cottage cheese     When reading labels- look for high protein and if carbs, better to have higher fiber  with carbs so net carbs are lower.     Take time to shop, read labels, plan healthy meals and snacks on the goal.  Protein shakes may help- example Premier protein     - Drink 8 glasses of water a day/ 64 oz  - Do not drink your calories ( no regular pop, juice, high calorie coffee drinks, limit alcohol)  - Eat high protein meals and snacks- aim for 15-30 gm of protein / meal and chose snacks that are high in protein ( ex hard boiled eggs, string cheese, cottage cheese, greek yogurt. Natural fats  and lean meats are also a good source of protein.  Limit carbs to 100 gm/ day. When choosing carbs chose healthy carbs ( fruit/ veggies/ whole grain options/ sweet potatoes). Dietdoctor.com is good resource for this.  - Don't deprive yourself of food you like, just limit amount and make smart choices    Exercise:   - Exercise- aim for 30 minutes 5 times a week of cardiac activity. Also strength training 2-3 times a week  30 minutes 5 times a week  is recommended for weight maintenance, but for weight loss the goal is 300 minutes per/ week   [employer] Sheyla  Even with those goals above, some exercise is better then none. If you only have 20 minutes in the morning, do it!     Behavior:   - Write down everything you eat for a few days- right away and think about why you are eating ( are you hungry, or are you eating because you are bored, tired, etc)- to get back on track or use Lose it Sheyla  - Do not eat late at night  -work on stress and sleep.  - Weight yourself once a week first thing in the morning  Use sheyla LOSE IT or MY FITNESS PAL to track calories  Calm sheyla to help with stress     Supplements:  Vitamin D  -start taking fiber supplement daily- ex psyllium ( ex citracel, metamucil or generic psyllium ( can get at [employer] ex)). This helps keep stools regular, helps you feel full and can be good for the heart. Also increasing fiber in your diet is helpful.     Resources:       Healthy food info: dietdoctor.com    Good recipes: skinpresley blog     Podcast : strong as a working mom- Narda Healy MD. You don't need to be a working mom to find some of these tips helpful.        MY BEST ADVICE:  EAT LOW CARB AND LOW SUGAR- looks at labels.   EAT HIGH PROTEIN TO FILL YOU UP  AND FOODS HIGH IN FIBER  EAT LESS PROCESSED FOODS/ MORE CLEAN EATING- this makes those two ideas above easier  EXERCISE FOR MOOD/ SLEEP/ENERGY / YOUR HEART/ AND HELP WITH WEIGHT LOSS . GET GOOD SLEEP.    IF YOU HAVE A BAD DAY, DON'T BEAT YOURSELF UP AND LOSE CONTROL. JUST GET BACK ON TRACK.

## 2025-02-10 ENCOUNTER — OFFICE VISIT (OUTPATIENT)
Dept: FAMILY MEDICINE CLINIC | Facility: CLINIC | Age: 65
End: 2025-02-10
Payer: COMMERCIAL

## 2025-02-10 VITALS
WEIGHT: 256 LBS | HEIGHT: 66 IN | BODY MASS INDEX: 41.14 KG/M2 | OXYGEN SATURATION: 98 % | DIASTOLIC BLOOD PRESSURE: 63 MMHG | SYSTOLIC BLOOD PRESSURE: 159 MMHG | TEMPERATURE: 97 F | RESPIRATION RATE: 18 BRPM | HEART RATE: 61 BPM

## 2025-02-10 DIAGNOSIS — L08.9 SKIN INFECTION: Primary | ICD-10-CM

## 2025-02-10 PROCEDURE — 3078F DIAST BP <80 MM HG: CPT | Performed by: NURSE PRACTITIONER

## 2025-02-10 PROCEDURE — 99213 OFFICE O/P EST LOW 20 MIN: CPT | Performed by: NURSE PRACTITIONER

## 2025-02-10 PROCEDURE — 3008F BODY MASS INDEX DOCD: CPT | Performed by: NURSE PRACTITIONER

## 2025-02-10 PROCEDURE — 3077F SYST BP >= 140 MM HG: CPT | Performed by: NURSE PRACTITIONER

## 2025-02-10 RX ORDER — CEPHALEXIN 500 MG/1
500 CAPSULE ORAL 3 TIMES DAILY
Qty: 21 CAPSULE | Refills: 0 | Status: SHIPPED | OUTPATIENT
Start: 2025-02-10 | End: 2025-02-17

## 2025-02-10 NOTE — PROGRESS NOTES
CHIEF COMPLAINT:     Chief Complaint   Patient presents with    Wound Care     Sore on inner part of little toe.  I think I may need antibiotics. - Entered by patient  Noticed it last week around Friday   Right foot        HPI:     Kerline Boland is a 64 year old female who presents with concerns of a skin infection to 5th toe on right foot.. Patient first noticed symptoms 4 days ago.  Pt notes initially it looked like an abrasion to toe but now has become more reddened and tender. Denies any trauma/injury to area.Denies fever, streaking of wound, or other signs of systemic illness. Tolerates PO well at home. No n/v/d. Denies any other aggravating or relieving factors at home. Denies any other treatment attempts prior to arrival.        Current Outpatient Medications   Medication Sig Dispense Refill    cephALEXin (KEFLEX) 500 MG Oral Cap Take 1 capsule (500 mg total) by mouth 3 (three) times daily for 7 days. 21 capsule 0    atorvastatin 80 MG Oral Tab Take 1 tablet (80 mg total) by mouth nightly. 90 tablet 3    escitalopram 10 MG Oral Tab Take 1 tablet (10 mg total) by mouth daily. 90 tablet 3    metoprolol succinate ER 50 MG Oral Tablet 24 Hr Take 1 tablet (50 mg total) by mouth daily. 90 tablet 3    oxybutynin ER 10 MG Oral Tablet 24 Hr Take 1 tablet (10 mg total) by mouth daily. 90 tablet 3    losartan 50 MG Oral Tab Take 1 tablet (50 mg total) by mouth daily. 90 tablet 1      Past Medical History:    Abdominal pain    Anxiety state    Arthritis    Belching    Bloating    Blurred vision    Need new glasses    Change in hair    Depression    Diarrhea, unspecified    Easy bruising    Bruise and bleed easy    Essential hypertension    Fatigue    Flatulence/gas pain/belching    Heartburn    High blood pressure    High cholesterol    History of depression    Hyperlipidemia    Irregular bowel habits    Loss of appetite    Nausea    Osteoarthritis    knees    Pain in joints    Right knee replacement    Painful  swallowing    Postmenopause    Problems with swallowing    Uncomfortable fullness after meals    Visual impairment    glasses    Vomiting    Rescently has gotten worae    Wears glasses    Weight loss      Social History:  Social History     Socioeconomic History    Marital status: Single   Tobacco Use    Smoking status: Former     Current packs/day: 0.00     Average packs/day: 1 pack/day for 30.0 years (30.0 ttl pk-yrs)     Types: Cigarettes     Start date: 1981     Quit date:      Years since quittin.8    Smokeless tobacco: Never   Vaping Use    Vaping status: Never Used   Substance and Sexual Activity    Alcohol use: Yes     Comment: rarely    Drug use: No   Other Topics Concern    Caffeine Concern Yes     Comment: 3 cups a day coffee    Exercise No        REVIEW OF SYSTEMS:   GENERAL: feels well otherwise, no fever, no chills.  SKIN: as above.  CHEST: no chest pains, no palpitations.  LUNGS: denies shortness of breath with exertion or rest. No wheezing, no cough.  LYMPH: No enlargement of the lymph nodes.  MUSC/SKEL: no joint swelling, no joint stiffness.  NEURO: no abnormal sensation, no tingling of the skin or numbness.    EXAM:   /63   Pulse 61   Temp 96.9 °F (36.1 °C)   Resp 18   Ht 5' 6\" (1.676 m)   Wt 256 lb (116.1 kg)   LMP 2010   SpO2 98%   BMI 41.32 kg/m²   GENERAL: well developed, well nourished,in no apparent distress  SKIN: No rashes. (See toe assessment)  HEAD: atraumatic, normocephalic  EYES: conjunctiva clear, EOM intact  NOSE: Normal external nose.  No rhinorrhea.  NECK: supple, non-tender  LUNGS: clear to auscultation bilaterally, no wheezes or rhonchi. Breathing is non labored.  CARDIO: RRR without murmur  EXTREMITIES:   Exam of right 5th toe -->  - skin: There is a 0.5cm abrasion noted to medial aspect of toes. There is some surrounding erythema noted. No fluctuance. No drainage. Area is slightly tender to palpation. Mild abnormal tactile warmth.  -  deformity/defect: none  - crepitus: none  - ecchymosis/bruising: none  - Tenderness: + over abrasion  - Range of motion: full and without pain  - Tendons intact: intact  - pedal pulses: 2+  - Cap refill: wnl  - sensation: intact  - Motor exam: ambulatory with steady agit.  LYMPH: No lymphadenopathy.      ASSESSMENT AND PLAN:       ICD-10-CM    1. Skin infection  L08.9 cephALEXin (KEFLEX) 500 MG Oral Cap        Triple abx ointment and gauze dressing applied.     Discussed physical exam and hpi with pt. Pt has reassuring physical exam consistent with an abrasion to medial aspect of the 5th digit on her right foot. Unfortunately it appears there is a secondary infection at this time.. Treatment options discussed with patient and explained in detail. She prefers oral abx today. Will start cephalexin along with supportive care and follow up with PCP or Podiatry. (Provided pt with Dr. Martinez's office). The risks, benefits and potential side effects of possible medications were reviewed. Alternatives were discussed. Monitoring parameters and expected course outlined. Patient to call PCP or go to emergency department if symptoms fail to respond as outlined, or worsen in any way. The patient agreed with the plan.     Patient Instructions   Rest. Keep area clean.  Supportive care as discussed.   Cephalexin as prescribed.  Follow up with PMD or Podiatry in 3-4 days for reeval. Follow up sooner or go to the emergency department immediately if symptoms worsen, change, or if you have any concerns.

## 2025-02-10 NOTE — PATIENT INSTRUCTIONS
Rest. Keep area clean.  Supportive care as discussed.   Cephalexin as prescribed.  Follow up with PMD or Podiatry in 3-4 days for reeval. Follow up sooner or go to the emergency department immediately if symptoms worsen, change, or if you have any concerns.     FAMILY HISTORY:  No pertinent family history in first degree relatives

## 2025-02-12 ENCOUNTER — APPOINTMENT (OUTPATIENT)
Dept: MAMMOGRAPHY | Age: 65
End: 2025-02-12
Attending: FAMILY MEDICINE
Payer: COMMERCIAL

## 2025-02-12 ENCOUNTER — LABORATORY ENCOUNTER (OUTPATIENT)
Dept: LAB | Age: 65
End: 2025-02-12
Attending: FAMILY MEDICINE
Payer: COMMERCIAL

## 2025-02-12 DIAGNOSIS — Z00.00 PHYSICAL EXAM: ICD-10-CM

## 2025-02-12 LAB
ALBUMIN SERPL-MCNC: 4.1 G/DL (ref 3.2–4.8)
ALBUMIN/GLOB SERPL: 1.6 {RATIO} (ref 1–2)
ALP LIVER SERPL-CCNC: 82 U/L
ALT SERPL-CCNC: 22 U/L
ANION GAP SERPL CALC-SCNC: 8 MMOL/L (ref 0–18)
AST SERPL-CCNC: 19 U/L (ref ?–34)
BASOPHILS # BLD AUTO: 0.05 X10(3) UL (ref 0–0.2)
BASOPHILS NFR BLD AUTO: 0.9 %
BILIRUB SERPL-MCNC: 0.6 MG/DL (ref 0.2–1.1)
BUN BLD-MCNC: 17 MG/DL (ref 9–23)
CALCIUM BLD-MCNC: 9.7 MG/DL (ref 8.7–10.6)
CHLORIDE SERPL-SCNC: 104 MMOL/L (ref 98–112)
CHOLEST SERPL-MCNC: 206 MG/DL (ref ?–200)
CO2 SERPL-SCNC: 29 MMOL/L (ref 21–32)
CREAT BLD-MCNC: 0.77 MG/DL
EGFRCR SERPLBLD CKD-EPI 2021: 87 ML/MIN/1.73M2 (ref 60–?)
EOSINOPHIL # BLD AUTO: 0.17 X10(3) UL (ref 0–0.7)
EOSINOPHIL NFR BLD AUTO: 2.9 %
ERYTHROCYTE [DISTWIDTH] IN BLOOD BY AUTOMATED COUNT: 12 %
EST. AVERAGE GLUCOSE BLD GHB EST-MCNC: 123 MG/DL (ref 68–126)
FASTING PATIENT LIPID ANSWER: YES
FASTING STATUS PATIENT QL REPORTED: YES
GLOBULIN PLAS-MCNC: 2.5 G/DL (ref 2–3.5)
GLUCOSE BLD-MCNC: 84 MG/DL (ref 70–99)
HBA1C MFR BLD: 5.9 % (ref ?–5.7)
HCT VFR BLD AUTO: 41.5 %
HDLC SERPL-MCNC: 59 MG/DL (ref 40–59)
HGB BLD-MCNC: 13.4 G/DL
IMM GRANULOCYTES # BLD AUTO: 0.02 X10(3) UL (ref 0–1)
IMM GRANULOCYTES NFR BLD: 0.3 %
LDLC SERPL CALC-MCNC: 118 MG/DL (ref ?–100)
LYMPHOCYTES # BLD AUTO: 1.79 X10(3) UL (ref 1–4)
LYMPHOCYTES NFR BLD AUTO: 30.5 %
MCH RBC QN AUTO: 30.5 PG (ref 26–34)
MCHC RBC AUTO-ENTMCNC: 32.3 G/DL (ref 31–37)
MCV RBC AUTO: 94.3 FL
MONOCYTES # BLD AUTO: 0.46 X10(3) UL (ref 0.1–1)
MONOCYTES NFR BLD AUTO: 7.8 %
NEUTROPHILS # BLD AUTO: 3.38 X10 (3) UL (ref 1.5–7.7)
NEUTROPHILS # BLD AUTO: 3.38 X10(3) UL (ref 1.5–7.7)
NEUTROPHILS NFR BLD AUTO: 57.6 %
NONHDLC SERPL-MCNC: 147 MG/DL (ref ?–130)
OSMOLALITY SERPL CALC.SUM OF ELEC: 293 MOSM/KG (ref 275–295)
PLATELET # BLD AUTO: 260 10(3)UL (ref 150–450)
POTASSIUM SERPL-SCNC: 5 MMOL/L (ref 3.5–5.1)
PROT SERPL-MCNC: 6.6 G/DL (ref 5.7–8.2)
RBC # BLD AUTO: 4.4 X10(6)UL
SODIUM SERPL-SCNC: 141 MMOL/L (ref 136–145)
TRIGL SERPL-MCNC: 168 MG/DL (ref 30–149)
TSI SER-ACNC: 2.74 UIU/ML (ref 0.55–4.78)
VLDLC SERPL CALC-MCNC: 29 MG/DL (ref 0–30)
WBC # BLD AUTO: 5.9 X10(3) UL (ref 4–11)

## 2025-02-12 PROCEDURE — 83036 HEMOGLOBIN GLYCOSYLATED A1C: CPT | Performed by: FAMILY MEDICINE

## 2025-02-12 PROCEDURE — 80050 GENERAL HEALTH PANEL: CPT | Performed by: FAMILY MEDICINE

## 2025-02-12 PROCEDURE — 80061 LIPID PANEL: CPT | Performed by: FAMILY MEDICINE

## 2025-05-20 ENCOUNTER — HOSPITAL ENCOUNTER (OUTPATIENT)
Dept: MAMMOGRAPHY | Age: 65
Discharge: HOME | End: 2025-05-20
Attending: FAMILY MEDICINE
Payer: COMMERCIAL

## 2025-05-20 DIAGNOSIS — Z12.31 ENCOUNTER FOR SCREENING MAMMOGRAM FOR MALIGNANT NEOPLASM OF BREAST: ICD-10-CM

## 2025-05-20 PROCEDURE — 77063 BREAST TOMOSYNTHESIS BI: CPT | Performed by: FAMILY MEDICINE

## 2025-05-20 PROCEDURE — 77067 SCR MAMMO BI INCL CAD: CPT | Performed by: FAMILY MEDICINE

## 2025-06-13 NOTE — PLAN OF CARE
Outpatient Medication Detail     Disp Refills Start End    Budesonide-Formoterol Fumarate (SYMBICORT) 160-4.5 MCG/ACT Inhalation Aerosol 30.6 Undefined 9 2025 --    Sig - Route: Inhale 2 puffs into the lungs 2 (two) times daily. - Inhalation    Sent to pharmacy as: Budesonide-Formoterol Fumarate 160-4.5 MCG/ACT Inhalation Aerosol (Symbicort)    E-Prescribing Status: Receipt confirmed by pharmacy (2025 10:26 AM CDT)    No prior authorization was found for this prescription.    Found prior authorization for another prescription for the same medication: Canceled - Other ()      Associated Diagnoses    Stage 4 very severe COPD by GOLD classification ([facility])        Pharmacy    [facility] - [facility] [address] [facility] [phone], [phone]      Attempted to get pt up to chair with PCT for first time. Pt declines stating \"no, not right now\". Told pt the importance of ambulating and getting up when nerve block wears off. Pt stated that she was too tired at this time and \"just want to sleep\".  Wi

## 2025-08-13 ENCOUNTER — NURSE TRIAGE (OUTPATIENT)
Dept: INTERNAL MEDICINE CLINIC | Facility: CLINIC | Age: 65
End: 2025-08-13

## 2025-08-13 ENCOUNTER — HOSPITAL ENCOUNTER (EMERGENCY)
Age: 65
Discharge: HOME | End: 2025-08-13
Attending: EMERGENCY MEDICINE

## 2025-08-13 ENCOUNTER — APPOINTMENT (OUTPATIENT)
Dept: GENERAL RADIOLOGY | Age: 65
End: 2025-08-13
Attending: PHYSICIAN ASSISTANT

## 2025-08-13 ENCOUNTER — TELEPHONE (OUTPATIENT)
Dept: INTERNAL MEDICINE CLINIC | Facility: CLINIC | Age: 65
End: 2025-08-13

## 2025-08-13 VITALS
DIASTOLIC BLOOD PRESSURE: 73 MMHG | TEMPERATURE: 98 F | HEIGHT: 66 IN | BODY MASS INDEX: 40.18 KG/M2 | SYSTOLIC BLOOD PRESSURE: 168 MMHG | OXYGEN SATURATION: 100 % | HEART RATE: 74 BPM | RESPIRATION RATE: 17 BRPM | WEIGHT: 250 LBS

## 2025-08-13 DIAGNOSIS — S49.91XA INJURY OF RIGHT SHOULDER, INITIAL ENCOUNTER: Primary | ICD-10-CM

## 2025-08-13 PROCEDURE — 73030 X-RAY EXAM OF SHOULDER: CPT | Performed by: PHYSICIAN ASSISTANT

## 2025-08-13 PROCEDURE — 99283 EMERGENCY DEPT VISIT LOW MDM: CPT

## 2025-08-13 PROCEDURE — 99284 EMERGENCY DEPT VISIT MOD MDM: CPT

## 2025-08-15 ENCOUNTER — OFFICE VISIT (OUTPATIENT)
Dept: ORTHOPEDICS CLINIC | Facility: CLINIC | Age: 65
End: 2025-08-15

## 2025-08-15 VITALS — HEIGHT: 66 IN | BODY MASS INDEX: 40.18 KG/M2 | WEIGHT: 250 LBS

## 2025-08-15 DIAGNOSIS — S46.001A INJURY OF RIGHT ROTATOR CUFF, INITIAL ENCOUNTER: ICD-10-CM

## 2025-08-15 DIAGNOSIS — M19.011 PRIMARY OSTEOARTHRITIS OF RIGHT SHOULDER: Primary | ICD-10-CM

## 2025-08-15 PROCEDURE — 99204 OFFICE O/P NEW MOD 45 MIN: CPT | Performed by: PHYSICIAN ASSISTANT

## 2025-08-28 ENCOUNTER — TELEPHONE (OUTPATIENT)
Dept: ORTHOPEDICS CLINIC | Facility: CLINIC | Age: 65
End: 2025-08-28

## (undated) DEVICE — SIGMA LCS HIGH PERFORMANCE INSTRUMENTS STERILE THREADED PINS
Type: IMPLANTABLE DEVICE | Site: KNEE
Brand: SIGMA LCS HIGH PERFORMANCE

## (undated) DEVICE — DECANTER BAG 9": Brand: MEDLINE INDUSTRIES, INC.

## (undated) DEVICE — Device: Brand: STABLECUT®

## (undated) DEVICE — BIPOLAR SEALER 23-112-1 AQM 6.0: Brand: AQUAMANTYS™

## (undated) DEVICE — STERILE POLYISOPRENE POWDER-FREE SURGICAL GLOVES: Brand: PROTEXIS

## (undated) DEVICE — BOWL CEMENT MIX QUICK-VAC

## (undated) DEVICE — TOTAL KNEE CDS: Brand: MEDLINE INDUSTRIES, INC.

## (undated) DEVICE — SIGMA LCS HIGH PERFORMANCE STERILE THREADED HEADED PINS: Brand: SIGMA LCS HIGH PERFORMANCE

## (undated) DEVICE — 2T11 #2 PDO 36 X 36: Brand: 2T11 #2 PDO 36 X 36

## (undated) DEVICE — WRAP COOLING KNEE W/ICE PILLOW

## (undated) DEVICE — LIGHT HANDLE

## (undated) DEVICE — 450 ML BOTTLE OF 0.05% CHLORHEXIDINE GLUCONATE IN 99.95% STERILE WATER FOR IRRIGATION, USP AND APPLICATOR.: Brand: IRRISEPT ANTIMICROBIAL WOUND LAVAGE

## (undated) DEVICE — SUTURE VICRYL 1 OS-6

## (undated) DEVICE — ZIMMER® STERILE DISPOSABLE TOURNIQUET CUFF WITH PLC, DUAL PORT, SINGLE BLADDER, 34 IN. (86 CM)

## (undated) DEVICE — HOOD: Brand: FLYTE

## (undated) DEVICE — CONVERTORS STOCKINETTE: Brand: CONVERTORS

## (undated) DEVICE — BANDAGE ELASTIC ACE 6\" X-LONG

## (undated) DEVICE — SUTURE VICRYL 2-0 CP-1

## (undated) DEVICE — DRAPE,U/SHT,SPLIT,FILM,60X84,STERILE: Brand: MEDLINE

## (undated) DEVICE — GOWN SURG AERO CHROME XXL

## (undated) DEVICE — SPECIMEN CONTAINER,POSITIVE SEAL INDICATOR, OR PACKAGED: Brand: PRECISION

## (undated) DEVICE — DRESSING AQUACEL AG 3.5X12

## (undated) DEVICE — SOL  .9 1000ML BTL

## (undated) DEVICE — KENDALL SCD EXPRESS SLEEVES, KNEE LENGTH, MEDIUM: Brand: KENDALL SCD

## (undated) DEVICE — UNIVERSAL STERIBUMP® STERILE (5/CASE): Brand: UNIVERSAL STERIBUMP®

## (undated) DEVICE — HOOD, PEEL-AWAY: Brand: FLYTE

## (undated) DEVICE — SOL  .9 3000ML

## (undated) DEVICE — SIGMA LCS HIGH PERFORMANCE INSTRUMENTS STERILE THREADED PINS: Brand: SIGMA LCS HIGH PERFORMANCE

## (undated) DEVICE — CHLORAPREP 26ML APPLICATOR

## (undated) DEVICE — MLPD DISPOSABLE PAD (6' ROLL) 3 ROLLS: Brand: SCHAERER MEDICAL USA

## (undated) DEVICE — STERILE POLYISOPRENE POWDER-FREE SURGICAL GLOVES WITH EMOLLIENT COATING: Brand: PROTEXIS

## (undated) NOTE — MR AVS SNAPSHOT
EMG [address] [facility]  [address] [facility]  [place] [id number] [phone]               Thank you for choosing us for your health care visit with LOYD Phelps. We are glad to serve you and happy to provide you with this summary of your visit.   Mitzi These medications were sent to [address] [facility], [address] [facility], [address] [facility] [place] [id number]     Phone:  [phone]    - Amoxicillin-Pot Clavulanate 500-125 MG Tabs            MyChart     Visi

## (undated) NOTE — LETTER
08/04/20      [address]  [facility] [address]        Our records indicate that you are due for Mammogram. Your order is active and ready to be fulfilled.  Please call our central scheduling number [phone] If you would like t

## (undated) NOTE — LETTER
[facility] [address]  [phone]          Date: 8/19/2020     Patient Name: Evens Aquino              To Whom it may concern:     This letter has been written at th

## (undated) NOTE — ED AVS SNAPSHOT
Carlos Goldstein   MRN: [id number]    Department:  [facility] Emergency Department   Date of Visit:  7/19/2018           Disclosure     Insurance plans vary and the physician(s) referred by the ER may not be covered by your plan.  Please contact you tell this physician (or your personal doctor if your instructions are to return to your personal doctor) about any new or lasting problems. The primary care or specialist physician will see patients referred from the [facility] Emergency Department.  Jefferson Aquino

## (undated) NOTE — LETTER
:  3/23/1960      To Whom It May Concern: This patient was seen virtually on 22 . Please give patient two weeks off of work for stress related reasons. If this office may be of further assistance, please do not hesitate to contact us.       Sincerely,        Florence Doss MD

## (undated) NOTE — LETTER
:  3/23/1960      To Whom It May Concern: This patient was seen virtually on 22 . Work status: off work as of 22 for personal issues and stress. Please allow to return to work 23 . If this office may be of further assistance, please do not hesitate to contact us.       Sincerely,        Cathy Quesada MD

## (undated) NOTE — LETTER
:  3/23/1960      To Whom It May Concern: This patient was seen on 22 - virtual visit. Please excuse from work until 22. She may return to work 22. If this office may be of further assistance, please do not hesitate to contact us.       Sincerely,        Liliana Grijalva MD

## (undated) NOTE — LETTER
Patient Name: Gladys Schafer  : 3/23/1960  MRN: [id number]  Patient Address: [address] [place] [address]      Coronavirus Disease 2019 (COVID-19)     [facility] is committed to the safety and well-being of our patients, members carefully. If your symptoms get worse, call your healthcare provider immediately. 3. Get rest and stay hydrated.    4. If you have a medical appointment, call the healthcare provider ahead of time and tell them that you have or may have COVID-19.  5. For m of fever-reducing medications; and  · Improvement in respiratory symptoms (e.g., cough, shortness of breath); and  · At least 10 days have passed since symptoms first appeared OR if asymptomatic patient or date of symptom onset is unclear then use 10 days donors must:    · Have had a confirmed diagnosis of COVID-19  · Be symptom-free for at least 14 days*    *Some people will be required to have a repeat COVID-19 test in order to be eligible to donate.  If you’re instructed by Yocasta that a repeat test is r random. Researchers are trying to identify similarities between people with a Post-COVID condition to better understand if there are risk factors. How do I prevent a Post-COVID condition?   The best way to prevent the long-term symptoms of COVID-19 is

## (undated) NOTE — LETTER
Date: 6/22/2020    Patient Name: Ann Simmonds          To Whom it may concern: The above patient was seen at the [facility] for treatment of a medical condition. 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

## (undated) NOTE — Clinical Note
Date: 4/18/2017    Patient Name: Janet Hanks          To Whom it may concern: This letter has been written at the patient's request. The above patient was seen at the [facility] for treatment of a medical condition.     This patient reg

## (undated) NOTE — LETTER
Date: 3/31/2021    Patient Name: Saúl Hale          To Whom it may concern: This letter has been written at the patient's request. The above patient was seen at the [facility] for treatment of a medical condition.       The patient ma

## (undated) NOTE — ED AVS SNAPSHOT
Evens Miles   MRN: [id number]    Department:  [facility] Emergency Department   Date of Visit:  3/2/2019           Disclosure     Insurance plans vary and the physician(s) referred by the ER may not be covered by your plan.  Please contact your tell this physician (or your personal doctor if your instructions are to return to your personal doctor) about any new or lasting problems. The primary care or specialist physician will see patients referred from the [facility] Emergency Department.  Isiah Kumar

## (undated) NOTE — ED AVS SNAPSHOT
Sapna Hamlin   MRN: [id number]    Department:  [facility] Emergency Department   Date of Visit:  6/17/2019           Disclosure     Insurance plans vary and the physician(s) referred by the ER may not be covered by your plan.  Please contact you tell this physician (or your personal doctor if your instructions are to return to your personal doctor) about any new or lasting problems. The primary care or specialist physician will see patients referred from the [facility] Emergency Department.  Wing Mendes

## (undated) NOTE — IP AVS SNAPSHOT
Patient Demographics     Address  [facility] [place]  [address] Phone  [phone] [facility]) *Preferred*  [phone] [facility]) E-mail Address  [email or web address]      Emergency Contact(s)     Name Relation Home Work [address] [place] [address] ? For knee replacement surgery, follow instructions provided by physical therapy. ? Do NOT put a pillow under your knee as it may be more difficult to straighten afterwards. No smoking  ? Avoid smoking.  It is known to cause breathing problems and can d ? Surgical discomfort is normal for one to two months. ? Have realistic goals and keep a positive outlook. ? You may need pain medication regularly (every 4-6 hours) the first 2 weeks and then begin to decrease how often you are taking it.   ? Take pain m Prevention of infection and promotion of healing  ? Good hand washing is important. Everyone should wash their hands or use hand  as soon as they walk in your house-whether they live there or are visiting. ?  Keep bed linen/clothing freshly launde ? Increased pain at incision not relieved by pain medication. Signs of blood clot  ? Pain, excessive tenderness, redness, or swelling in leg or calf (other than incision site).   (CALL SURGEON)      Go directly to the ER or CALL 911 if  you:  ? b Ena Lima MD. Schedule an appointment as soon as possible for a visit in 1 week. Specialty: Family Medicine  Contact information:  [address] [place] [address] Tom Puentes MD In 2 weeks.     Specialtie Take 1 tablet (10 mg total) by mouth daily. Anayeli Barger MD         Sertraline HCl 100 MG Tabs  Commonly known as: ZOLOFT  Next dose due: Tomorrow morning       Take 1 tablet (100 mg total) by mouth daily.   Stop taking on: January 13, 2021   Wilton Rouse [id number] ketorolac tromethamine (TORADOL) 30 MG/ML injection 30 mg 12/02/20 1758 Given      [id number] ketorolac tromethamine (TORADOL) 30 MG/ML injection 30 mg 12/03/20 0032 Given      [id number] ketorolac tromethamine (TORADOL) 30 MG/ML injection 30 mg 1 GFR, -American 69 >=60 — [facility])            CBC WITH DIFFERENTIAL WITH PLATELET [[id number]] (Abnormal)  Resulted: 12/03/20 0749, Result status: Final result   Ordering provider: Jenna Wagner MD  12/02/20 0662 Resulting lab: Jayesh Trotter Mable Lama is a(n) 64year old female. The patient has failed conservative treatment for left knee osteoarthritis and has significant limitations in ADL's including ambulation and exercise, and presents for total joint arthroplasty at this time.     H • Cancer Brother 28        burkitt's lymphoma   • Alcohol and Other Disorders Associated Brother         Cocaine   • Diabetes Brother    • Hypertension Brother    • Heart Attack Brother    • Stroke Brother    • Heart Disorder Brother 28   • Other (Other) B Hannah Doctor  11/30/2020  7:33 AM[TK.1]      Electronically signed by Truman Byrant MD on 11/30/2020  7:33 AM   Attribution Key    TK. 1 - Truman Bryant MD on 11/30/2020  7:33 AM                        Consults - MD Consult Notes      Consults • Uncomfortable fullness after meals Sept. 2019   • Visual impairment     glasses   • Vomiting 2017    Rescently has gotten worae   • Wears glasses 2009   • Weight loss Recent        Past Surgical History:   Past Surgical History:   Procedure Laterality Da •  ferrous sulfate 325 (65 FE) MG Oral Tab EC, Take 325 mg by mouth daily with breakfast., Disp: , Rfl:     •  lisinopril 40 MG Oral Tab, Take 1 tablet (40 mg total) by mouth daily. , Disp: 90 tablet, Rfl: 0    •  Sertraline HCl 100 MG Oral Tab, Take 1 tabl 5. Ortho following[PT.1]  2. Murmur noted on exam, last ECHO 2018 - patient is on Lasix d/t lower ext edema  1. Hold Lasix  2. Check ECHO  3. Further work-up based on ECHO findings[PT.2]  3. Essential hypertension  1. Toprol with hold parameters  2.  Hold L • High blood pressure    • High cholesterol    • History of depression 2016   • Irregular bowel habits 2017   • Loss of appetite Recent   • Nausea 2017   • Osteoarthritis     knees   • Pain in joints June 2019    Right knee replacement   • Painful swallowi Neurological Findings: None                   ACTIVITY TOLERANCE                         O2 WALK                  AM-PAC '6-Clicks' INPATIENT SHORT FORM - BASIC MOBILITY  How much difficulty does the patient currently have. ..  -   Turning over in bed (incl Pt instructed in amb on stairs with use of railing and cane,  performed with step to pattern with supervision, simulated car transfer training which pt performed without assist. Pt returned to bs chair.   Pt instructed in therex per TKA protocol(to perform Patient has been evaluated and presents with no skilled Physical Therapy needs at this time. Patient discharged from Physical Therapy services. Please re-order if a new functional limitation presents during this admission.     GOALS  Patient was able to a • Flatulence/gas pain/belching 2019   • Heartburn 1980   • High blood pressure    • High cholesterol    • History of depression 2016   • Irregular bowel habits 2017   • Loss of appetite Recent   • Nausea 2017   • Osteoarthritis     knees   • Pain in joints WFL    RANGE OF MOTION AND STRENGTH ASSESSMENT  Upper extremity ROM is within functional limits     Upper extremity strength is within functional limits     NEUROLOGICAL FINDINGS[BW.1]  Neurological Findings: None[BW.2]                ACTIVITY TOLERANCE Patient End of Session: Up in chair;Needs met;Call light within reach; All patient questions and concerns addressed;SCDs in place; Ice applied[BW. 2]    ASSESSMENT   Patient is a[BW.3 64year old[BW.2] female admitted 12/2/2020 for L TKA.  Patient seen this a Patient/Caregiver able to demonstrate safety with ADLS: At supervision level or above; patient reports will have supervision at home     ---------------------------------------------------------------------------------------------------------  PPE worn by - Ice packs  - PT/OT  - See additional Care Plan goals for specific interventions

## (undated) NOTE — LETTER
Date: 8/28/2021    Patient Name: Lucio Loyola      To Whom it may concern: This letter has been written at the patient's request. The above patient was seen at the [facility].      She is cleared to return back to work, with no restricti

## (undated) NOTE — MR AVS SNAPSHOT
[facility]  [address] [facility] [address]  [phone]               Thank you for choosing us for your health care visit with Jovana Potts MD.  We are glad to serve you and happy to provide you with this summary o If you've recently had a stay at the Hospital you can access your discharge instructions in [employer] by going to Visits < Admission Summaries.  If you've been to the Emergency Department or your doctor's office, you can view your past visit information in My

## (undated) NOTE — LETTER
01/28/19        Nataliya Grad   [address]  [facility] [address]          Dear José Miguel Rodríguez,    [address] [facility] records indicate that you are due for fasting blood work (lipid panel, CMP, CBC)   Please call our office during normal business hours so that we may sc

## (undated) NOTE — Clinical Note
Date: 4/21/2017    Patient Name: Yaquelin Ramirez          To Whom it may concern: This letter has been written at the patient's request. The above patient was seen at the [facility] for treatment of a medical condition.     This patient reg

## (undated) NOTE — Clinical Note
Date: 2/1/2017    Patient Name: Angela Durand          To Whom it may concern: The above patient was seen at the [facility] for treatment of a medical condition. This patient should be excused from attending work due to illness.     Nirali Leo

## (undated) NOTE — MR AVS SNAPSHOT
[facility]  [address] [facility] [address]  [phone]               Thank you for choosing us for your health care visit with Nelly Wilks MD.  We are glad to serve you and happy to provide you with this summary o You can access your MyChart to more actively manage your health care and view more details from this visit by going to [email or web address].   If you've recently had a stay at the Hospital you can access your discharge instructions in [address] by [email or web address]

## (undated) NOTE — LETTER
Date: 12/12/2017    Patient Name: Sina Sher          To Whom it may concern: This letter has been written at the patient's request. The above patient was seen at the [facility] for treatment of a medical condition.     This patient sh

## (undated) NOTE — LETTER
Date: 3/16/2021    Patient Name: Evens Aquino          To Whom it may concern: This letter has been written at the patient's request. The above patient was seen at the [facility] for treatment of a medical condition.     This patient reg

## (undated) NOTE — LETTER
Date: 10/31/2017    Patient Name: Adamaris Hurt          To Whom it may concern: The above patient was seen at the [facility] for treatment of a medical condition. This patient should be excused from attending work 10/30/2017.     The

## (undated) NOTE — LETTER
Date: 8/19/2020    Patient Name: Evelyn Gonzales          To Whom it may concern: This letter has been written at the patient's request. The above patient was seen at the [facility] for treatment of a medical condition.     This patient reg

## (undated) NOTE — MR AVS SNAPSHOT
After Visit Summary   7/8/2021    Jovanny Darby    MRN: [id number]           Visit Information     Date & Time  7/8/2021  1:30 PM Provider  Marbella Linda MD Department  [address] [place] [address] Dept.  Phone  [phone] [facility] [address]    12/13/2021 10:20 AM [facility] now offers Video Visits through [address] for adult and pediatric patients.   Video Visits are available Monday - Friday for many common Scott Murray   Monday – Friday  10:00 am – 10:00 pm   Saturday – Sunday  10:00 am – 4:00 pm     [facility]   Monday – Friday  4:00 pm – 10:00 pm   Saturday – Sunday  10:00 am – 4:00 pm  WALK-IN CARE  Emergency Medicine Providers  Conditions needin

## (undated) NOTE — LETTER
Date: 1/24/2019    Patient Name: Mable Lama          To Whom it may concern: This letter has been written at the patient's request. The above patient was seen at the [facility] for treatment of a medical condition.     This patient reg